# Patient Record
Sex: FEMALE | Race: BLACK OR AFRICAN AMERICAN | NOT HISPANIC OR LATINO | Employment: UNEMPLOYED | URBAN - METROPOLITAN AREA
[De-identification: names, ages, dates, MRNs, and addresses within clinical notes are randomized per-mention and may not be internally consistent; named-entity substitution may affect disease eponyms.]

---

## 2023-06-09 ENCOUNTER — HOSPITAL ENCOUNTER (OUTPATIENT)
Dept: DATA CONVERSION | Facility: HOSPITAL | Age: 58
End: 2023-06-09
Attending: INTERNAL MEDICINE | Admitting: INTERNAL MEDICINE
Payer: COMMERCIAL

## 2023-06-09 DIAGNOSIS — J45.998 OTHER ASTHMA (HHS-HCC): ICD-10-CM

## 2023-06-09 DIAGNOSIS — F17.210 NICOTINE DEPENDENCE, CIGARETTES, UNCOMPLICATED: ICD-10-CM

## 2023-06-09 DIAGNOSIS — K21.9 GASTRO-ESOPHAGEAL REFLUX DISEASE WITHOUT ESOPHAGITIS: ICD-10-CM

## 2023-06-09 DIAGNOSIS — I11.0 HYPERTENSIVE HEART DISEASE WITH HEART FAILURE (MULTI): ICD-10-CM

## 2023-06-09 DIAGNOSIS — K58.9 IRRITABLE BOWEL SYNDROME WITHOUT DIARRHEA: ICD-10-CM

## 2023-06-09 DIAGNOSIS — I27.20 PULMONARY HYPERTENSION, UNSPECIFIED (MULTI): ICD-10-CM

## 2023-06-09 DIAGNOSIS — D12.3 BENIGN NEOPLASM OF TRANSVERSE COLON: ICD-10-CM

## 2023-06-09 DIAGNOSIS — Z88.0 ALLERGY STATUS TO PENICILLIN: ICD-10-CM

## 2023-06-09 DIAGNOSIS — Z12.11 ENCOUNTER FOR SCREENING FOR MALIGNANT NEOPLASM OF COLON: ICD-10-CM

## 2023-06-09 DIAGNOSIS — R19.7 DIARRHEA, UNSPECIFIED: ICD-10-CM

## 2023-06-09 DIAGNOSIS — R11.2 NAUSEA WITH VOMITING, UNSPECIFIED: ICD-10-CM

## 2023-06-09 DIAGNOSIS — R10.13 EPIGASTRIC PAIN: ICD-10-CM

## 2023-06-09 DIAGNOSIS — E11.9 TYPE 2 DIABETES MELLITUS WITHOUT COMPLICATIONS (MULTI): ICD-10-CM

## 2023-06-09 DIAGNOSIS — E78.5 HYPERLIPIDEMIA, UNSPECIFIED: ICD-10-CM

## 2023-06-09 DIAGNOSIS — I50.9 HEART FAILURE, UNSPECIFIED (MULTI): ICD-10-CM

## 2023-06-09 LAB — POCT GLUCOSE: 112 MG/DL (ref 74–99)

## 2023-06-20 LAB
COMPLETE PATHOLOGY REPORT: NORMAL
CONVERTED CLINICAL DIAGNOSIS-HISTORY: NORMAL
CONVERTED FINAL DIAGNOSIS: NORMAL
CONVERTED FINAL REPORT PDF LINK TO COPY AND PASTE: NORMAL
CONVERTED GROSS DESCRIPTION: NORMAL

## 2023-09-07 VITALS — BODY MASS INDEX: 47.28 KG/M2 | HEIGHT: 61 IN | WEIGHT: 250.44 LBS

## 2023-09-22 PROBLEM — M54.9 BACKACHE: Status: ACTIVE | Noted: 2023-09-22

## 2023-09-22 PROBLEM — K59.09 CHRONIC CONSTIPATION: Status: ACTIVE | Noted: 2023-08-10

## 2023-09-22 PROBLEM — I50.9 HEART FAILURE (MULTI): Status: ACTIVE | Noted: 2023-06-09

## 2023-09-22 PROBLEM — M47.817 SPONDYLOSIS WITHOUT MYELOPATHY OR RADICULOPATHY, LUMBOSACRAL REGION: Status: ACTIVE | Noted: 2023-09-22

## 2023-09-22 PROBLEM — M17.11 OSTEOARTHRITIS OF RIGHT KNEE: Status: ACTIVE | Noted: 2021-11-27

## 2023-09-22 PROBLEM — R91.1 SOLITARY PULMONARY NODULE: Status: ACTIVE | Noted: 2022-05-03

## 2023-09-22 PROBLEM — Z98.890 HISTORY OF LUMBAR LAMINECTOMY: Status: ACTIVE | Noted: 2022-10-18

## 2023-09-22 PROBLEM — L93.0 DISCOID LUPUS ERYTHEMATOSUS: Status: ACTIVE | Noted: 2021-11-27

## 2023-09-22 PROBLEM — M96.1 FAILED BACK SURGICAL SYNDROME: Status: ACTIVE | Noted: 2023-09-22

## 2023-09-22 PROBLEM — F17.200 TOBACCO DEPENDENCE SYNDROME: Status: ACTIVE | Noted: 2023-09-22

## 2023-09-22 PROBLEM — J45.901 EXACERBATION OF ASTHMA (HHS-HCC): Status: ACTIVE | Noted: 2023-09-22

## 2023-09-22 PROBLEM — J34.3 HYPERTROPHY OF NASAL TURBINATES: Status: ACTIVE | Noted: 2023-08-23

## 2023-09-22 PROBLEM — K64.9 HEMORRHOIDS: Status: ACTIVE | Noted: 2023-04-03

## 2023-09-22 PROBLEM — F17.200 NICOTINE USE DISORDER: Status: ACTIVE | Noted: 2018-10-12

## 2023-09-22 PROBLEM — M19.90 INFLAMMATORY ARTHRITIS: Status: ACTIVE | Noted: 2022-10-18

## 2023-09-22 PROBLEM — M47.812 CERVICAL SPONDYLOSIS WITHOUT MYELOPATHY: Status: ACTIVE | Noted: 2022-10-18

## 2023-09-22 PROBLEM — T81.9XXA COMPLICATION OF SURGICAL PROCEDURE: Status: ACTIVE | Noted: 2022-07-01

## 2023-09-22 PROBLEM — E78.5 HYPERLIPIDEMIA: Status: ACTIVE | Noted: 2023-09-22

## 2023-09-22 PROBLEM — E11.43 GASTROPARESIS DUE TO DM (MULTI): Status: ACTIVE | Noted: 2022-07-01

## 2023-09-22 PROBLEM — I89.0 LYMPHEDEMA: Status: ACTIVE | Noted: 2022-11-11

## 2023-09-22 PROBLEM — G89.29 OTHER CHRONIC PAIN: Status: ACTIVE | Noted: 2023-09-22

## 2023-09-22 PROBLEM — R05.9 COUGH: Status: ACTIVE | Noted: 2022-01-06

## 2023-09-22 PROBLEM — R10.13 EPIGASTRIC PAIN: Status: ACTIVE | Noted: 2023-09-22

## 2023-09-22 PROBLEM — T84.84XA PAIN DUE TO KNEE JOINT PROSTHESIS (CMS-HCC): Status: ACTIVE | Noted: 2022-12-22

## 2023-09-22 PROBLEM — R76.8 ELEVATED ANTINUCLEAR ANTIBODY (ANA) LEVEL: Status: ACTIVE | Noted: 2022-10-18

## 2023-09-22 PROBLEM — R07.89 CHEST DISCOMFORT: Status: ACTIVE | Noted: 2023-09-22

## 2023-09-22 PROBLEM — Z96.659 POSTOPERATIVE STIFFNESS OF TOTAL KNEE REPLACEMENT (CMS-HCC): Status: ACTIVE | Noted: 2023-08-10

## 2023-09-22 PROBLEM — D68.61 ANTIPHOSPHOLIPID SYNDROME (MULTI): Status: ACTIVE | Noted: 2022-12-22

## 2023-09-22 PROBLEM — M06.4 INFLAMMATORY POLYARTHRITIS (MULTI): Status: ACTIVE | Noted: 2022-10-18

## 2023-09-22 PROBLEM — K31.84 GASTROPARESIS DUE TO DM (MULTI): Status: ACTIVE | Noted: 2022-07-01

## 2023-09-22 PROBLEM — Z86.79 HISTORY OF HYPERTENSION: Status: ACTIVE | Noted: 2023-03-10

## 2023-09-22 PROBLEM — J45.909 ASTHMA (HHS-HCC): Status: ACTIVE | Noted: 2023-09-22

## 2023-09-22 PROBLEM — J44.9 CHRONIC OBSTRUCTIVE PULMONARY DISEASE (MULTI): Status: ACTIVE | Noted: 2022-07-01

## 2023-09-22 PROBLEM — R09.02 HYPOXIA: Status: ACTIVE | Noted: 2023-09-22

## 2023-09-22 PROBLEM — G47.33 OBSTRUCTIVE SLEEP APNEA SYNDROME: Status: ACTIVE | Noted: 2022-07-01

## 2023-09-22 PROBLEM — M46.1 INFLAMMATION OF RIGHT SACROILIAC JOINT (CMS-HCC): Status: ACTIVE | Noted: 2023-09-22

## 2023-09-22 PROBLEM — J30.9 ALLERGIC RHINITIS: Status: ACTIVE | Noted: 2023-08-23

## 2023-09-22 PROBLEM — M32.9 SYSTEMIC LUPUS ERYTHEMATOSUS (MULTI): Status: ACTIVE | Noted: 2022-05-03

## 2023-09-22 PROBLEM — E55.9 VITAMIN D DEFICIENCY: Status: ACTIVE | Noted: 2022-10-18

## 2023-09-22 PROBLEM — R07.89 ATYPICAL CHEST PAIN: Status: ACTIVE | Noted: 2023-09-22

## 2023-09-22 PROBLEM — K31.84 GASTROPARESIS: Status: ACTIVE | Noted: 2023-09-22

## 2023-09-22 PROBLEM — E87.6 HYPOKALEMIA: Status: ACTIVE | Noted: 2021-05-12

## 2023-09-22 PROBLEM — E11.9 DIABETES MELLITUS WITHOUT COMPLICATION (MULTI): Status: ACTIVE | Noted: 2022-07-08

## 2023-09-22 PROBLEM — M62.81 MUSCLE WEAKNESS: Status: ACTIVE | Noted: 2022-07-01

## 2023-09-22 PROBLEM — I27.20 PULMONARY HYPERTENSION (MULTI): Status: ACTIVE | Noted: 2022-07-01

## 2023-09-22 PROBLEM — I15.9 SECONDARY HYPERTENSION: Status: ACTIVE | Noted: 2022-10-18

## 2023-09-22 PROBLEM — M16.10 PRIMARY LOCALIZED OSTEOARTHRITIS OF PELVIC REGION AND THIGH: Status: ACTIVE | Noted: 2022-10-18

## 2023-09-22 PROBLEM — M35.9 DISORDER OF CONNECTIVE TISSUE (MULTI): Status: ACTIVE | Noted: 2022-10-18

## 2023-09-22 PROBLEM — E11.9 TYPE 2 DIABETES MELLITUS (MULTI): Status: ACTIVE | Noted: 2017-06-30

## 2023-09-22 PROBLEM — G25.81 RESTLESS LEGS SYNDROME: Status: ACTIVE | Noted: 2019-12-18

## 2023-09-22 PROBLEM — U09.9 POST-COVID-19 CONDITION: Status: ACTIVE | Noted: 2022-09-22

## 2023-09-22 PROBLEM — R10.9 ABDOMINAL PAIN: Status: ACTIVE | Noted: 2023-09-22

## 2023-09-22 PROBLEM — T84.099A MECHANICAL COMPLICATION OF INTERNAL JOINT PROSTHESIS (CMS-HCC): Status: ACTIVE | Noted: 2023-08-10

## 2023-09-22 PROBLEM — R06.00 DYSPNEA: Status: ACTIVE | Noted: 2023-09-22

## 2023-09-22 PROBLEM — F41.9 ANXIETY DISORDER: Status: ACTIVE | Noted: 2021-10-21

## 2023-09-22 PROBLEM — M25.569 JOINT PAIN, KNEE: Status: ACTIVE | Noted: 2023-09-22

## 2023-09-22 PROBLEM — Z96.653 HISTORY OF BILATERAL KNEE ARTHROPLASTY: Status: ACTIVE | Noted: 2021-02-17

## 2023-09-22 PROBLEM — M54.50 LUMBAR SPINE PAIN: Status: ACTIVE | Noted: 2022-12-05

## 2023-09-22 PROBLEM — E13.9 LATENT AUTOIMMUNE DIABETES MELLITUS IN ADULT (LADA) (MULTI): Status: ACTIVE | Noted: 2022-07-01

## 2023-09-22 PROBLEM — Z96.651 STATUS POST REVISION OF TOTAL REPLACEMENT OF RIGHT KNEE: Status: ACTIVE | Noted: 2021-01-27

## 2023-09-22 PROBLEM — M11.20 CALCIUM PYROPHOSPHATE DEPOSITION DISEASE: Status: ACTIVE | Noted: 2022-07-01

## 2023-09-22 PROBLEM — M51.36 DEGENERATION OF INTERVERTEBRAL DISC OF LUMBAR REGION: Status: ACTIVE | Noted: 2022-07-01

## 2023-09-22 PROBLEM — E66.813 OBESITY, CLASS III, BMI 40-49.9 (MORBID OBESITY): Chronic | Status: ACTIVE | Noted: 2018-10-09

## 2023-09-22 PROBLEM — K52.9 GASTROENTERITIS: Status: ACTIVE | Noted: 2021-05-12

## 2023-09-22 PROBLEM — M25.561 PAIN IN RIGHT KNEE: Status: ACTIVE | Noted: 2018-01-05

## 2023-09-22 PROBLEM — J45.40 MODERATE PERSISTENT ASTHMA WITHOUT COMPLICATION (HHS-HCC): Status: ACTIVE | Noted: 2022-01-10

## 2023-09-22 PROBLEM — M54.16 LUMBAR RADICULITIS: Status: ACTIVE | Noted: 2023-05-19

## 2023-09-22 PROBLEM — E66.01 OBESITY, CLASS III, BMI 40-49.9 (MORBID OBESITY) (MULTI): Chronic | Status: ACTIVE | Noted: 2018-10-09

## 2023-09-22 PROBLEM — T84.89XA POSTOPERATIVE STIFFNESS OF TOTAL KNEE REPLACEMENT (CMS-HCC): Status: ACTIVE | Noted: 2023-08-10

## 2023-09-22 PROBLEM — M53.3 COCCYDYNIA: Status: ACTIVE | Noted: 2022-10-18

## 2023-09-22 PROBLEM — M51.369 DEGENERATION OF INTERVERTEBRAL DISC OF LUMBAR REGION: Status: ACTIVE | Noted: 2022-07-01

## 2023-09-22 PROBLEM — M25.669 POSTOPERATIVE STIFFNESS OF TOTAL KNEE REPLACEMENT (CMS-HCC): Status: ACTIVE | Noted: 2023-08-10

## 2023-09-22 PROBLEM — T84.029A DISLOCATION OF PROSTHETIC JOINT (CMS-HCC): Status: ACTIVE | Noted: 2022-10-18

## 2023-09-22 PROBLEM — R00.2 PALPITATIONS: Status: ACTIVE | Noted: 2021-10-22

## 2023-09-22 PROBLEM — F31.9 BIPOLAR AFFECTIVE DISORDER, CURRENTLY ACTIVE (MULTI): Status: ACTIVE | Noted: 2020-08-15

## 2023-09-22 PROBLEM — M10.9 GOUT: Status: ACTIVE | Noted: 2022-07-01

## 2023-09-22 PROBLEM — K21.00 GASTROESOPHAGEAL REFLUX DISEASE WITH ESOPHAGITIS: Status: ACTIVE | Noted: 2023-02-28

## 2023-09-22 PROBLEM — R11.2 NAUSEA & VOMITING: Status: ACTIVE | Noted: 2023-09-22

## 2023-09-22 PROBLEM — J18.9 PNEUMONIA: Status: ACTIVE | Noted: 2023-09-22

## 2023-09-22 PROBLEM — I89.0 LYMPHEDEMA OF BOTH LOWER EXTREMITIES: Status: ACTIVE | Noted: 2022-07-08

## 2023-09-22 PROBLEM — I11.9 HYPERTENSIVE HEART DISEASE: Status: ACTIVE | Noted: 2023-06-09

## 2023-09-22 PROBLEM — R10.13 DYSPEPSIA: Status: ACTIVE | Noted: 2021-05-12

## 2023-09-22 PROBLEM — E11.8: Status: ACTIVE | Noted: 2022-10-18

## 2023-09-22 PROBLEM — M35.89 MIXED COLLAGEN VASCULAR DISEASE (MULTI): Status: ACTIVE | Noted: 2022-12-22

## 2023-09-22 PROBLEM — K58.9 IRRITABLE BOWEL SYNDROME: Status: ACTIVE | Noted: 2022-09-14

## 2023-09-22 PROBLEM — Z96.659 PAIN DUE TO KNEE JOINT PROSTHESIS (CMS-HCC): Status: ACTIVE | Noted: 2022-12-22

## 2023-09-22 PROBLEM — Z96.642 PRESENCE OF LEFT ARTIFICIAL HIP JOINT: Status: ACTIVE | Noted: 2021-11-27

## 2023-09-22 PROBLEM — Z96.651 PRESENCE OF RIGHT ARTIFICIAL KNEE JOINT: Status: ACTIVE | Noted: 2023-09-22

## 2023-09-22 PROBLEM — G83.4 CAUDA EQUINA SYNDROME (MULTI): Status: ACTIVE | Noted: 2022-10-18

## 2023-09-22 RX ORDER — FLUTICASONE FUROATE, UMECLIDINIUM BROMIDE AND VILANTEROL TRIFENATATE 200; 62.5; 25 UG/1; UG/1; UG/1
1 POWDER RESPIRATORY (INHALATION)
COMMUNITY
Start: 2023-08-23

## 2023-09-22 RX ORDER — METOCLOPRAMIDE 5 MG/1
1 TABLET ORAL DAILY
COMMUNITY
Start: 2022-09-14

## 2023-09-22 RX ORDER — GLIMEPIRIDE 1 MG/1
TABLET ORAL EVERY 24 HOURS
COMMUNITY
End: 2024-02-26 | Stop reason: ALTCHOICE

## 2023-09-22 RX ORDER — TIZANIDINE 2 MG/1
TABLET ORAL
COMMUNITY
Start: 2021-10-18 | End: 2024-02-26 | Stop reason: ALTCHOICE

## 2023-09-22 RX ORDER — ROSUVASTATIN CALCIUM 20 MG/1
20 TABLET, COATED ORAL DAILY
COMMUNITY
Start: 2021-10-02

## 2023-09-22 RX ORDER — METHYLPREDNISOLONE 4 MG/1
TABLET ORAL
COMMUNITY
Start: 2023-04-21 | End: 2024-02-26 | Stop reason: ALTCHOICE

## 2023-09-22 RX ORDER — OXYCODONE HYDROCHLORIDE 5 MG/1
1 TABLET ORAL 2 TIMES DAILY
COMMUNITY
End: 2024-03-14 | Stop reason: ENTERED-IN-ERROR

## 2023-09-22 RX ORDER — CHLORZOXAZONE 500 MG/1
TABLET ORAL EVERY 12 HOURS
COMMUNITY
Start: 2022-01-31 | End: 2024-02-26 | Stop reason: ALTCHOICE

## 2023-09-22 RX ORDER — AMLODIPINE BESYLATE 10 MG/1
TABLET ORAL
COMMUNITY
Start: 2023-05-30 | End: 2024-02-26 | Stop reason: ALTCHOICE

## 2023-09-22 RX ORDER — HYDROXYZINE PAMOATE 50 MG/1
CAPSULE ORAL
COMMUNITY
Start: 2021-10-22 | End: 2024-03-14 | Stop reason: ENTERED-IN-ERROR

## 2023-09-22 RX ORDER — CHLORZOXAZONE 250 MG/1
TABLET ORAL
COMMUNITY
End: 2024-03-14 | Stop reason: ENTERED-IN-ERROR

## 2023-09-22 RX ORDER — NAPROXEN SODIUM 220 MG/1
81 TABLET, FILM COATED ORAL
COMMUNITY
Start: 2021-03-22 | End: 2024-03-14 | Stop reason: ENTERED-IN-ERROR

## 2023-09-22 RX ORDER — ERGOCALCIFEROL 1.25 MG/1
50000 CAPSULE ORAL WEEKLY
COMMUNITY
Start: 2023-04-19 | End: 2023-10-04

## 2023-09-22 RX ORDER — PREGABALIN 25 MG/1
CAPSULE ORAL
COMMUNITY
Start: 2023-01-17 | End: 2024-02-26 | Stop reason: ALTCHOICE

## 2023-09-22 RX ORDER — ALBUTEROL SULFATE 90 UG/1
2 AEROSOL, METERED RESPIRATORY (INHALATION)
COMMUNITY
Start: 2021-08-09 | End: 2024-02-26 | Stop reason: ALTCHOICE

## 2023-09-22 RX ORDER — OXYBUTYNIN CHLORIDE 10 MG/1
10 TABLET, EXTENDED RELEASE ORAL
COMMUNITY
Start: 2023-07-06 | End: 2023-10-04

## 2023-09-22 RX ORDER — CYCLOBENZAPRINE HCL 5 MG
5 TABLET ORAL 3 TIMES DAILY PRN
COMMUNITY
Start: 2021-08-30 | End: 2024-02-26 | Stop reason: ALTCHOICE

## 2023-09-22 RX ORDER — METFORMIN HYDROCHLORIDE 500 MG/1
TABLET, EXTENDED RELEASE ORAL
COMMUNITY
Start: 2022-12-22 | End: 2024-02-26 | Stop reason: ALTCHOICE

## 2023-09-22 RX ORDER — CLOTRIMAZOLE 1 %
CREAM (GRAM) TOPICAL
COMMUNITY
Start: 2023-03-06 | End: 2024-03-14 | Stop reason: ENTERED-IN-ERROR

## 2023-09-22 RX ORDER — PROMETHAZINE HYDROCHLORIDE AND DEXTROMETHORPHAN HYDROBROMIDE 6.25; 15 MG/5ML; MG/5ML
5 SYRUP ORAL 4 TIMES DAILY PRN
COMMUNITY
Start: 2022-10-03 | End: 2024-02-26 | Stop reason: ALTCHOICE

## 2023-09-22 RX ORDER — LEVOFLOXACIN 750 MG/1
TABLET ORAL
COMMUNITY
Start: 2023-04-20 | End: 2024-01-15 | Stop reason: SDUPTHER

## 2023-09-22 RX ORDER — MINERAL OIL
180 ENEMA (ML) RECTAL
COMMUNITY
Start: 2023-07-06 | End: 2024-03-15 | Stop reason: HOSPADM

## 2023-09-22 RX ORDER — FLUTICASONE PROPIONATE 50 MCG
1 SPRAY, SUSPENSION (ML) NASAL 2 TIMES DAILY PRN
COMMUNITY
Start: 2017-06-10

## 2023-09-22 RX ORDER — DOXEPIN HYDROCHLORIDE 25 MG/1
CAPSULE ORAL EVERY 24 HOURS
COMMUNITY
Start: 2021-12-03 | End: 2024-02-26 | Stop reason: ALTCHOICE

## 2023-09-22 RX ORDER — COLCHICINE 0.6 MG/1
1 TABLET ORAL
COMMUNITY
Start: 2022-06-23 | End: 2024-03-14 | Stop reason: ENTERED-IN-ERROR

## 2023-09-22 RX ORDER — MOMETASONE FUROATE AND FORMOTEROL FUMARATE DIHYDRATE 100; 5 UG/1; UG/1
AEROSOL RESPIRATORY (INHALATION)
COMMUNITY
End: 2024-02-26 | Stop reason: ALTCHOICE

## 2023-09-22 RX ORDER — BENZONATATE 100 MG/1
CAPSULE ORAL
COMMUNITY
Start: 2022-10-10 | End: 2024-02-26 | Stop reason: ALTCHOICE

## 2023-09-22 RX ORDER — BUSPIRONE HYDROCHLORIDE 15 MG/1
TABLET ORAL
COMMUNITY
Start: 2021-12-03 | End: 2024-02-26 | Stop reason: ALTCHOICE

## 2023-09-22 RX ORDER — MIRABEGRON 25 MG/1
TABLET, FILM COATED, EXTENDED RELEASE ORAL
COMMUNITY
End: 2024-03-14 | Stop reason: ENTERED-IN-ERROR

## 2023-09-22 RX ORDER — MICONAZOLE NITRATE 2 %
CREAM WITH APPLICATOR VAGINAL
COMMUNITY
Start: 2023-04-03 | End: 2024-02-26 | Stop reason: ALTCHOICE

## 2023-09-22 RX ORDER — PANTOPRAZOLE SODIUM 40 MG/1
1 TABLET, DELAYED RELEASE ORAL
COMMUNITY
Start: 2021-10-02 | End: 2023-11-01 | Stop reason: SDUPTHER

## 2023-09-22 RX ORDER — ERYTHROMYCIN 5 MG/G
OINTMENT OPHTHALMIC
COMMUNITY
Start: 2023-01-06 | End: 2024-02-26 | Stop reason: ALTCHOICE

## 2023-09-22 RX ORDER — LORATADINE 10 MG/1
10 TABLET ORAL DAILY
COMMUNITY
Start: 2023-02-15 | End: 2024-02-26 | Stop reason: ALTCHOICE

## 2023-09-22 RX ORDER — MIRTAZAPINE 15 MG/1
TABLET, FILM COATED ORAL
COMMUNITY
Start: 2020-08-15 | End: 2024-02-26 | Stop reason: ALTCHOICE

## 2023-09-22 RX ORDER — PREDNISONE 20 MG/1
TABLET ORAL
COMMUNITY
Start: 2023-05-19 | End: 2024-01-15 | Stop reason: SDUPTHER

## 2023-09-22 RX ORDER — HYDROCORTISONE 25 MG/G
CREAM TOPICAL
COMMUNITY
Start: 2023-03-07 | End: 2024-02-26 | Stop reason: ALTCHOICE

## 2023-09-22 RX ORDER — NALOXONE HYDROCHLORIDE 4 MG/.1ML
4 SPRAY NASAL
COMMUNITY
Start: 2023-06-22 | End: 2024-03-14 | Stop reason: ENTERED-IN-ERROR

## 2023-09-22 RX ORDER — ALBUTEROL SULFATE 90 UG/1
AEROSOL, METERED RESPIRATORY (INHALATION)
COMMUNITY

## 2023-09-22 RX ORDER — FLUOROMETHOLONE 1 MG/ML
SUSPENSION/ DROPS OPHTHALMIC
COMMUNITY
Start: 2023-01-03 | End: 2024-02-26 | Stop reason: ALTCHOICE

## 2023-09-22 RX ORDER — FLUTICASONE FUROATE AND VILANTEROL 200; 25 UG/1; UG/1
POWDER RESPIRATORY (INHALATION)
COMMUNITY
Start: 2021-08-09 | End: 2024-02-26 | Stop reason: ALTCHOICE

## 2023-09-22 RX ORDER — ROPINIROLE 0.5 MG/1
0.5 TABLET, FILM COATED ORAL NIGHTLY
COMMUNITY
Start: 2023-08-10

## 2023-09-22 RX ORDER — TORSEMIDE 20 MG/1
20 TABLET ORAL
COMMUNITY
Start: 2023-07-06 | End: 2024-02-26 | Stop reason: ALTCHOICE

## 2023-09-22 RX ORDER — VIT C/E/ZN/COPPR/LUTEIN/ZEAXAN 250MG-90MG
3 CAPSULE ORAL
COMMUNITY
Start: 2021-10-27 | End: 2024-03-14 | Stop reason: ENTERED-IN-ERROR

## 2023-09-22 RX ORDER — AMITRIPTYLINE HYDROCHLORIDE 10 MG/1
1 TABLET, FILM COATED ORAL NIGHTLY
COMMUNITY
Start: 2023-05-29 | End: 2024-05-16 | Stop reason: ALTCHOICE

## 2023-09-22 RX ORDER — AZELASTINE 1 MG/ML
1 SPRAY, METERED NASAL 2 TIMES DAILY PRN
COMMUNITY
Start: 2023-08-23 | End: 2024-03-15 | Stop reason: HOSPADM

## 2023-09-22 RX ORDER — ZINC SULFATE 50(220)MG
CAPSULE ORAL
COMMUNITY
End: 2024-02-26 | Stop reason: ALTCHOICE

## 2023-09-22 RX ORDER — METOPROLOL TARTRATE 50 MG/1
1 TABLET ORAL
COMMUNITY
Start: 2021-05-12 | End: 2024-02-26 | Stop reason: WASHOUT

## 2023-09-22 RX ORDER — AZELASTINE HYDROCHLORIDE 0.5 MG/ML
SOLUTION/ DROPS OPHTHALMIC
COMMUNITY
Start: 2022-11-08 | End: 2024-02-26 | Stop reason: ALTCHOICE

## 2023-09-22 RX ORDER — DULOXETIN HYDROCHLORIDE 30 MG/1
30 CAPSULE, DELAYED RELEASE ORAL 2 TIMES DAILY
COMMUNITY
Start: 2022-12-22 | End: 2024-02-26 | Stop reason: ALTCHOICE

## 2023-09-22 RX ORDER — METRONIDAZOLE 7.5 MG/G
GEL VAGINAL
COMMUNITY
Start: 2023-02-03 | End: 2024-02-26 | Stop reason: ALTCHOICE

## 2023-09-22 RX ORDER — BUSPIRONE HYDROCHLORIDE 5 MG/1
TABLET ORAL
COMMUNITY
End: 2024-02-26 | Stop reason: ALTCHOICE

## 2023-09-22 RX ORDER — LANOLIN ALCOHOL/MO/W.PET/CERES
1 CREAM (GRAM) TOPICAL
COMMUNITY
Start: 2021-10-27

## 2023-09-22 RX ORDER — PREDNISONE 50 MG/1
50 TABLET ORAL DAILY
COMMUNITY
Start: 2022-10-05 | End: 2022-10-10

## 2023-09-22 RX ORDER — MONTELUKAST SODIUM 10 MG/1
10 TABLET ORAL DAILY PRN
COMMUNITY
Start: 2016-02-10

## 2023-09-22 RX ORDER — HYOSCYAMINE SULFATE 0.12 MG/1
TABLET, ORALLY DISINTEGRATING ORAL
COMMUNITY
Start: 2021-10-06 | End: 2024-03-14

## 2023-09-22 RX ORDER — CETIRIZINE HYDROCHLORIDE 10 MG/1
10 TABLET ORAL DAILY
COMMUNITY
Start: 2015-03-20

## 2023-09-22 RX ORDER — CHLORTHALIDONE 25 MG/1
25 TABLET ORAL
COMMUNITY
Start: 2023-06-13 | End: 2024-03-21 | Stop reason: SDUPTHER

## 2023-09-22 RX ORDER — ACETAMINOPHEN 325 MG/1
650 TABLET ORAL
COMMUNITY
Start: 2021-08-30 | End: 2024-02-26 | Stop reason: ALTCHOICE

## 2023-09-22 RX ORDER — FAMOTIDINE 20 MG/1
TABLET, FILM COATED ORAL
COMMUNITY
Start: 2021-10-02 | End: 2024-02-26 | Stop reason: ALTCHOICE

## 2023-09-22 RX ORDER — CEFIXIME 400 MG/1
400 CAPSULE ORAL DAILY
COMMUNITY
Start: 2023-03-11 | End: 2023-03-18

## 2023-09-22 RX ORDER — LEVOFLOXACIN 500 MG/1
500 TABLET, FILM COATED ORAL DAILY
COMMUNITY
Start: 2023-05-03 | End: 2023-05-10

## 2023-09-22 RX ORDER — ACETAMINOPHEN 500 MG
5 TABLET ORAL DAILY PRN
COMMUNITY
Start: 2023-07-06

## 2023-09-22 RX ORDER — HYDROCODONE BITARTRATE AND ACETAMINOPHEN 5; 325 MG/1; MG/1
TABLET ORAL
COMMUNITY
Start: 2023-08-24 | End: 2024-02-26 | Stop reason: ALTCHOICE

## 2023-09-22 RX ORDER — HYDROCHLOROTHIAZIDE 25 MG/1
25 TABLET ORAL DAILY
COMMUNITY
Start: 2023-05-03 | End: 2024-02-26 | Stop reason: SINTOL

## 2023-09-22 RX ORDER — BISACODYL 5 MG
TABLET, DELAYED RELEASE (ENTERIC COATED) ORAL
COMMUNITY
Start: 2023-04-05

## 2023-09-22 RX ORDER — HYDROXYCHLOROQUINE SULFATE 200 MG/1
TABLET, FILM COATED ORAL
COMMUNITY
End: 2024-03-14 | Stop reason: ENTERED-IN-ERROR

## 2023-09-22 RX ORDER — METOPROLOL SUCCINATE 50 MG/1
50 TABLET, EXTENDED RELEASE ORAL
COMMUNITY
Start: 2023-06-05 | End: 2024-04-29 | Stop reason: WASHOUT

## 2023-09-22 RX ORDER — NAPROXEN 500 MG/1
TABLET ORAL
COMMUNITY
Start: 2022-10-10 | End: 2024-02-26 | Stop reason: ALTCHOICE

## 2023-09-22 RX ORDER — IPRATROPIUM BROMIDE AND ALBUTEROL SULFATE 2.5; .5 MG/3ML; MG/3ML
3 SOLUTION RESPIRATORY (INHALATION) EVERY 6 HOURS PRN
COMMUNITY
Start: 2023-08-23

## 2023-09-22 RX ORDER — SENNOSIDES 8.6 MG/1
8.6 TABLET ORAL
COMMUNITY
Start: 2021-07-01 | End: 2024-03-14 | Stop reason: ENTERED-IN-ERROR

## 2023-09-22 RX ORDER — DICLOFENAC SODIUM 10 MG/G
GEL TOPICAL
COMMUNITY
Start: 2021-10-19 | End: 2024-02-26 | Stop reason: ALTCHOICE

## 2023-09-22 RX ORDER — LIDOCAINE 50 MG/G
1 PATCH TOPICAL DAILY PRN
COMMUNITY
Start: 2015-10-13

## 2023-09-22 RX ORDER — LOSARTAN POTASSIUM AND HYDROCHLOROTHIAZIDE 25; 100 MG/1; MG/1
1 TABLET ORAL DAILY
COMMUNITY
Start: 2023-04-04 | End: 2024-02-26 | Stop reason: WASHOUT

## 2023-09-22 RX ORDER — ETODOLAC 400 MG/1
TABLET, FILM COATED ORAL EVERY 12 HOURS
COMMUNITY
Start: 2022-01-31 | End: 2024-03-14 | Stop reason: ENTERED-IN-ERROR

## 2023-09-22 RX ORDER — OXYBUTYNIN CHLORIDE 10 MG/1
10 TABLET, EXTENDED RELEASE ORAL DAILY
COMMUNITY

## 2023-09-22 RX ORDER — BEBTELOVIMAB 87.5 MG/ML
INJECTION, SOLUTION INTRAVENOUS
COMMUNITY
End: 2024-02-26 | Stop reason: ALTCHOICE

## 2023-09-22 RX ORDER — ALUMINUM HYDROXIDE, MAGNESIUM HYDROXIDE, AND SIMETHICONE 2400; 240; 2400 MG/30ML; MG/30ML; MG/30ML
SUSPENSION ORAL
COMMUNITY
Start: 2021-06-03 | End: 2024-03-14 | Stop reason: ENTERED-IN-ERROR

## 2023-09-22 RX ORDER — OXYCODONE AND ACETAMINOPHEN 5; 325 MG/1; MG/1
1 TABLET ORAL
COMMUNITY
Start: 2023-07-25 | End: 2023-10-19

## 2023-09-22 RX ORDER — APIXABAN 5 MG/1
TABLET, FILM COATED ORAL
COMMUNITY
Start: 2022-11-11 | End: 2024-02-26 | Stop reason: ALTCHOICE

## 2023-09-22 RX ORDER — PETROLATUM,WHITE 41 %
OINTMENT (GRAM) TOPICAL
COMMUNITY
Start: 2021-03-22 | End: 2024-03-14 | Stop reason: ENTERED-IN-ERROR

## 2023-09-22 RX ORDER — ZINC GLUCONATE 50 MG
TABLET ORAL
COMMUNITY
End: 2024-02-26 | Stop reason: ALTCHOICE

## 2023-09-22 RX ORDER — MELOXICAM 15 MG/1
15 TABLET ORAL
COMMUNITY
Start: 2021-11-02 | End: 2024-03-14 | Stop reason: ENTERED-IN-ERROR

## 2023-09-22 RX ORDER — TRAMADOL HYDROCHLORIDE 50 MG/1
TABLET ORAL
COMMUNITY
End: 2024-02-26 | Stop reason: ALTCHOICE

## 2023-09-22 RX ORDER — PRAMOXINE HYDROCHLORIDE HYDROCORTISONE ACETATE 100; 100 MG/10G; MG/10G
AEROSOL, FOAM TOPICAL
COMMUNITY
Start: 2021-05-12 | End: 2024-02-26 | Stop reason: ALTCHOICE

## 2023-09-22 RX ORDER — DEXLANSOPRAZOLE 60 MG/1
CAPSULE, DELAYED RELEASE ORAL
COMMUNITY
Start: 2022-09-14 | End: 2024-03-14 | Stop reason: ENTERED-IN-ERROR

## 2023-09-22 RX ORDER — ALBUTEROL SULFATE 0.83 MG/ML
2.5 SOLUTION RESPIRATORY (INHALATION) EVERY 6 HOURS PRN
COMMUNITY
Start: 2013-12-03

## 2023-09-22 RX ORDER — TOPIRAMATE 50 MG/1
50 TABLET, FILM COATED ORAL NIGHTLY
COMMUNITY
Start: 2023-03-10 | End: 2024-02-26 | Stop reason: ALTCHOICE

## 2023-09-22 RX ORDER — ONDANSETRON 4 MG/1
1 TABLET, FILM COATED ORAL EVERY 6 HOURS PRN
COMMUNITY

## 2023-09-22 RX ORDER — TOPIRAMATE 25 MG/1
25 TABLET ORAL NIGHTLY
COMMUNITY
End: 2024-02-26 | Stop reason: ALTCHOICE

## 2023-09-22 RX ORDER — PREDNISONE 10 MG/1
TABLET ORAL
COMMUNITY
Start: 2023-04-24 | End: 2024-02-26 | Stop reason: ALTCHOICE

## 2023-09-22 RX ORDER — HYDROCODONE BITARTRATE AND HOMATROPINE METHYLBROMIDE ORAL SOLUTION 5; 1.5 MG/5ML; MG/5ML
LIQUID ORAL
COMMUNITY
Start: 2022-10-05 | End: 2024-02-26 | Stop reason: ALTCHOICE

## 2023-09-22 RX ORDER — FAMOTIDINE 40 MG/1
1 TABLET, FILM COATED ORAL NIGHTLY
COMMUNITY
Start: 2023-08-23 | End: 2024-02-07 | Stop reason: SDUPTHER

## 2023-09-22 RX ORDER — LOSARTAN POTASSIUM AND HYDROCHLOROTHIAZIDE 12.5; 5 MG/1; MG/1
TABLET ORAL
COMMUNITY
Start: 2021-10-02 | End: 2024-02-26 | Stop reason: WASHOUT

## 2023-09-22 RX ORDER — AZELASTINE HCL 205.5 UG/1
SPRAY NASAL
COMMUNITY
Start: 2022-05-12 | End: 2024-02-26 | Stop reason: ALTCHOICE

## 2023-09-22 RX ORDER — SODIUM CHLORIDE 0.9 % (FLUSH) 0.9 %
10 SYRINGE (ML) INJECTION
COMMUNITY
Start: 2023-03-16 | End: 2024-02-26 | Stop reason: ALTCHOICE

## 2023-11-01 ENCOUNTER — OFFICE VISIT (OUTPATIENT)
Dept: GASTROENTEROLOGY | Facility: CLINIC | Age: 58
End: 2023-11-01
Payer: COMMERCIAL

## 2023-11-01 VITALS
DIASTOLIC BLOOD PRESSURE: 101 MMHG | WEIGHT: 261 LBS | BODY MASS INDEX: 48.03 KG/M2 | HEIGHT: 62 IN | SYSTOLIC BLOOD PRESSURE: 155 MMHG | HEART RATE: 77 BPM

## 2023-11-01 DIAGNOSIS — K21.9 GASTROESOPHAGEAL REFLUX DISEASE WITHOUT ESOPHAGITIS: Primary | ICD-10-CM

## 2023-11-01 PROCEDURE — 3077F SYST BP >= 140 MM HG: CPT | Performed by: INTERNAL MEDICINE

## 2023-11-01 PROCEDURE — 99214 OFFICE O/P EST MOD 30 MIN: CPT | Performed by: INTERNAL MEDICINE

## 2023-11-01 PROCEDURE — 3080F DIAST BP >= 90 MM HG: CPT | Performed by: INTERNAL MEDICINE

## 2023-11-01 RX ORDER — OXYCODONE AND ACETAMINOPHEN 5; 325 MG/1; MG/1
1 TABLET ORAL
COMMUNITY
Start: 2023-10-24 | End: 2023-11-23

## 2023-11-01 RX ORDER — PANTOPRAZOLE SODIUM 40 MG/1
40 TABLET, DELAYED RELEASE ORAL 2 TIMES DAILY
Qty: 60 TABLET | Refills: 11 | Status: SHIPPED | OUTPATIENT
Start: 2023-11-01 | End: 2024-02-26 | Stop reason: WASHOUT

## 2023-11-01 NOTE — PATIENT INSTRUCTIONS
I have ordered another gastric emptying study as we cannot locate the previous one and it would affect future treatment. For the acid reflux I increased the pantoprazole to twice a day. Follow up in 4 months.

## 2023-11-01 NOTE — PROGRESS NOTES
History Of Present Illness  Josemanuel Reinoso is a 58 y.o. female presenting with epigastric pain and nausea.     She has morbid obesity and has a longstanding history of abdominal pain, GERD and has a chart diagnosis of gastroparesis.  She is s/p ventral hernia repair in 2018. She reportedly had an abnormal gastric emptying study at an outside hospital but we cannot obtain the report.  She has been on chronic PPI therapy most recently pantoprazole 40 mg once a day.  She complains of frequent bouts of epigastric pain, nausea and regurgitation.  Symptoms are worse with bending over.  She also has had mid abdominal pain that has been attributed to recurrence of a ventral hernia.  Her weight has been stable.  Bowel movements have been fairly regular.  Her primary care physician gave her a prescription for Reglan but she has not taken it yet.     Past Medical History  Past Medical History:   Diagnosis Date    Personal history of other diseases of the circulatory system     History of hypertension    Personal history of other endocrine, nutritional and metabolic disease     History of diabetes mellitus       Surgical History  Past Surgical History:   Procedure Laterality Date    MR HEAD ANGIO WO IV CONTRAST  7/14/2019    MR HEAD ANGIO WO IV CONTR[facility]    MR HEAD ANGIO WO IV CONTRAST  7/14/2019    MR HEAD ANGIO WO IV CONTRAST [facility]    MR NECK ANGIO WO IV CONTRAST  7/14/2019    MR NECK ANGIO WO IV CONTRAST [facility]        Social History  She reports that she has been smoking cigarettes. She has never used smokeless tobacco. No history on file for alcohol use and drug use.    Family History  No family history on file.     Allergies  Azithromycin, Lamotrigine, Doxycycline, Erythromycin, Metformin, Penicillins, Cephalexin, Ciprocinonide, Codeine, Fluticasone propion-salmeterol, Ibuprofen, Pregabalin, Tramadol, Ciprofloxacin, Estradiol, and Nickel    Review of Systems     Physical  "Exam  Constitutional:       Appearance: Normal appearance.   Cardiovascular:      Rate and Rhythm: Normal rate and regular rhythm.   Pulmonary:      Effort: Pulmonary effort is normal.      Breath sounds: Normal breath sounds.   Abdominal:      General: Bowel sounds are normal.      Palpations: Abdomen is soft.      Tenderness: There is abdominal tenderness in the epigastric area.   Neurological:      Mental Status: She is alert.         Lab Results   Component Value Date    GLUCOSE 71 09/30/2022    CALCIUM 9.3 09/30/2022     09/30/2022    K 3.3 (L) 09/30/2022    CO2 29 09/30/2022     09/30/2022    BUN 12 09/30/2022    CREATININE 1.0 09/30/2022     Lab Results   Component Value Date    WBC 16.8 (H) 09/30/2022    HGB 12.7 09/30/2022    HCT 41.2 09/30/2022    MCV 82.1 09/30/2022     09/30/2022     09/30/2022    K 3.3 (L) 09/30/2022     09/30/2022    CO2 29 09/30/2022    BUN 12 09/30/2022    CREATININE 1.0 09/30/2022    CALCIUM 9.3 09/30/2022    PROT 7.2 09/30/2022    BILITOT 0.2 09/30/2022    ALKPHOS 129 (H) 09/30/2022    ALT 14 09/30/2022    AST 13 09/30/2022    GLUCOSE 71 09/30/2022        Last Recorded Vitals  Blood pressure (!) 155/101, pulse 77, height 1.575 m (5' 2\"), weight 118 kg (261 lb).    Relevant Results  No orders to display      No orders to display             ASSESSMENT & PLAN  Problem List Items Addressed This Visit       Gastroesophageal reflux disease without esophagitis - Primary    Relevant Medications    pantoprazole (ProtoNix) 40 mg EC tablet   Much of her problem is GERD related and if she has delayed gastric emptying it could be a contributing factor.  I have ordered another gastric emptying study to help clarify whether she does in fact have gastroparesis and will increase her pantoprazole to twice daily.  She will follow-up with me in 4 months time.      I spent 20 minutes in the professional and overall care of this patient.      Jose Francisco Suresh MD  "

## 2024-01-15 ENCOUNTER — APPOINTMENT (OUTPATIENT)
Dept: RADIOLOGY | Facility: HOSPITAL | Age: 59
End: 2024-01-15
Payer: COMMERCIAL

## 2024-01-15 ENCOUNTER — APPOINTMENT (OUTPATIENT)
Dept: CARDIOLOGY | Facility: HOSPITAL | Age: 59
End: 2024-01-15
Payer: COMMERCIAL

## 2024-01-15 ENCOUNTER — HOSPITAL ENCOUNTER (EMERGENCY)
Facility: HOSPITAL | Age: 59
Discharge: HOME | End: 2024-01-16
Attending: STUDENT IN AN ORGANIZED HEALTH CARE EDUCATION/TRAINING PROGRAM
Payer: COMMERCIAL

## 2024-01-15 VITALS
WEIGHT: 250 LBS | SYSTOLIC BLOOD PRESSURE: 137 MMHG | BODY MASS INDEX: 42.68 KG/M2 | RESPIRATION RATE: 22 BRPM | DIASTOLIC BLOOD PRESSURE: 87 MMHG | OXYGEN SATURATION: 97 % | TEMPERATURE: 98.4 F | HEART RATE: 75 BPM | HEIGHT: 64 IN

## 2024-01-15 DIAGNOSIS — J45.20 MILD INTERMITTENT ASTHMA, UNSPECIFIED WHETHER COMPLICATED (HHS-HCC): Primary | ICD-10-CM

## 2024-01-15 LAB
ALBUMIN SERPL BCP-MCNC: 3.6 G/DL (ref 3.4–5)
ALP SERPL-CCNC: 79 U/L (ref 33–110)
ALT SERPL W P-5'-P-CCNC: 11 U/L (ref 7–45)
ANION GAP SERPL CALC-SCNC: 12 MMOL/L (ref 10–20)
AST SERPL W P-5'-P-CCNC: 19 U/L (ref 9–39)
BASOPHILS # BLD AUTO: 0.06 X10*3/UL (ref 0–0.1)
BASOPHILS NFR BLD AUTO: 0.6 %
BILIRUB SERPL-MCNC: 0.3 MG/DL (ref 0–1.2)
BNP SERPL-MCNC: 33 PG/ML (ref 0–99)
BUN SERPL-MCNC: 10 MG/DL (ref 6–23)
CALCIUM SERPL-MCNC: 8.4 MG/DL (ref 8.6–10.3)
CARDIAC TROPONIN I PNL SERPL HS: 7 NG/L (ref 0–13)
CHLORIDE SERPL-SCNC: 107 MMOL/L (ref 98–107)
CO2 SERPL-SCNC: 27 MMOL/L (ref 21–32)
CREAT SERPL-MCNC: 0.86 MG/DL (ref 0.5–1.05)
EGFRCR SERPLBLD CKD-EPI 2021: 78 ML/MIN/1.73M*2
EOSINOPHIL # BLD AUTO: 0.12 X10*3/UL (ref 0–0.7)
EOSINOPHIL NFR BLD AUTO: 1.1 %
ERYTHROCYTE [DISTWIDTH] IN BLOOD BY AUTOMATED COUNT: 16.7 % (ref 11.5–14.5)
FLUAV RNA RESP QL NAA+PROBE: NOT DETECTED
FLUBV RNA RESP QL NAA+PROBE: NOT DETECTED
GLUCOSE SERPL-MCNC: 119 MG/DL (ref 74–99)
HCT VFR BLD AUTO: 38.3 % (ref 36–46)
HGB BLD-MCNC: 12 G/DL (ref 12–16)
IMM GRANULOCYTES # BLD AUTO: 0.04 X10*3/UL (ref 0–0.7)
IMM GRANULOCYTES NFR BLD AUTO: 0.4 % (ref 0–0.9)
LIPASE SERPL-CCNC: 29 U/L (ref 9–82)
LYMPHOCYTES # BLD AUTO: 4.35 X10*3/UL (ref 1.2–4.8)
LYMPHOCYTES NFR BLD AUTO: 40.5 %
MAGNESIUM SERPL-MCNC: 2.06 MG/DL (ref 1.6–2.4)
MCH RBC QN AUTO: 25.9 PG (ref 26–34)
MCHC RBC AUTO-ENTMCNC: 31.3 G/DL (ref 32–36)
MCV RBC AUTO: 83 FL (ref 80–100)
MONOCYTES # BLD AUTO: 0.7 X10*3/UL (ref 0.1–1)
MONOCYTES NFR BLD AUTO: 6.5 %
NEUTROPHILS # BLD AUTO: 5.47 X10*3/UL (ref 1.2–7.7)
NEUTROPHILS NFR BLD AUTO: 50.9 %
NRBC BLD-RTO: 0 /100 WBCS (ref 0–0)
PLATELET # BLD AUTO: 334 X10*3/UL (ref 150–450)
POTASSIUM SERPL-SCNC: 5 MMOL/L (ref 3.5–5.3)
PROT SERPL-MCNC: 6.2 G/DL (ref 6.4–8.2)
RBC # BLD AUTO: 4.63 X10*6/UL (ref 4–5.2)
RSV RNA RESP QL NAA+PROBE: NOT DETECTED
SARS-COV-2 RNA RESP QL NAA+PROBE: NOT DETECTED
SODIUM SERPL-SCNC: 141 MMOL/L (ref 136–145)
WBC # BLD AUTO: 10.7 X10*3/UL (ref 4.4–11.3)

## 2024-01-15 PROCEDURE — 71046 X-RAY EXAM CHEST 2 VIEWS: CPT

## 2024-01-15 PROCEDURE — 85025 COMPLETE CBC W/AUTO DIFF WBC: CPT | Performed by: NURSE PRACTITIONER

## 2024-01-15 PROCEDURE — 80053 COMPREHEN METABOLIC PANEL: CPT | Performed by: NURSE PRACTITIONER

## 2024-01-15 PROCEDURE — 94640 AIRWAY INHALATION TREATMENT: CPT

## 2024-01-15 PROCEDURE — 2500000002 HC RX 250 W HCPCS SELF ADMINISTERED DRUGS (ALT 637 FOR MEDICARE OP, ALT 636 FOR OP/ED): Performed by: NURSE PRACTITIONER

## 2024-01-15 PROCEDURE — 2500000004 HC RX 250 GENERAL PHARMACY W/ HCPCS (ALT 636 FOR OP/ED): Performed by: STUDENT IN AN ORGANIZED HEALTH CARE EDUCATION/TRAINING PROGRAM

## 2024-01-15 PROCEDURE — 84484 ASSAY OF TROPONIN QUANT: CPT | Performed by: NURSE PRACTITIONER

## 2024-01-15 PROCEDURE — 83690 ASSAY OF LIPASE: CPT | Performed by: NURSE PRACTITIONER

## 2024-01-15 PROCEDURE — 36415 COLL VENOUS BLD VENIPUNCTURE: CPT | Performed by: NURSE PRACTITIONER

## 2024-01-15 PROCEDURE — 83735 ASSAY OF MAGNESIUM: CPT | Performed by: NURSE PRACTITIONER

## 2024-01-15 PROCEDURE — 96375 TX/PRO/DX INJ NEW DRUG ADDON: CPT | Performed by: STUDENT IN AN ORGANIZED HEALTH CARE EDUCATION/TRAINING PROGRAM

## 2024-01-15 PROCEDURE — 83880 ASSAY OF NATRIURETIC PEPTIDE: CPT | Performed by: NURSE PRACTITIONER

## 2024-01-15 PROCEDURE — 71046 X-RAY EXAM CHEST 2 VIEWS: CPT | Performed by: RADIOLOGY

## 2024-01-15 PROCEDURE — 93005 ELECTROCARDIOGRAM TRACING: CPT

## 2024-01-15 PROCEDURE — 96365 THER/PROPH/DIAG IV INF INIT: CPT | Performed by: STUDENT IN AN ORGANIZED HEALTH CARE EDUCATION/TRAINING PROGRAM

## 2024-01-15 PROCEDURE — 73560 X-RAY EXAM OF KNEE 1 OR 2: CPT | Mod: RT

## 2024-01-15 PROCEDURE — 2500000001 HC RX 250 WO HCPCS SELF ADMINISTERED DRUGS (ALT 637 FOR MEDICARE OP): Performed by: STUDENT IN AN ORGANIZED HEALTH CARE EDUCATION/TRAINING PROGRAM

## 2024-01-15 PROCEDURE — 73560 X-RAY EXAM OF KNEE 1 OR 2: CPT | Mod: RIGHT SIDE | Performed by: RADIOLOGY

## 2024-01-15 PROCEDURE — 87637 SARSCOV2&INF A&B&RSV AMP PRB: CPT | Performed by: NURSE PRACTITIONER

## 2024-01-15 PROCEDURE — 99284 EMERGENCY DEPT VISIT MOD MDM: CPT | Performed by: STUDENT IN AN ORGANIZED HEALTH CARE EDUCATION/TRAINING PROGRAM

## 2024-01-15 RX ORDER — ALUMINUM HYDROXIDE, MAGNESIUM HYDROXIDE, AND SIMETHICONE 1200; 120; 1200 MG/30ML; MG/30ML; MG/30ML
10 SUSPENSION ORAL ONCE
Status: COMPLETED | OUTPATIENT
Start: 2024-01-15 | End: 2024-01-15

## 2024-01-15 RX ORDER — ALBUTEROL SULFATE 0.83 MG/ML
2.5 SOLUTION RESPIRATORY (INHALATION) ONCE
Status: COMPLETED | OUTPATIENT
Start: 2024-01-15 | End: 2024-01-15

## 2024-01-15 RX ORDER — PREDNISONE 20 MG/1
TABLET ORAL
Qty: 8 TABLET | Refills: 0 | Status: SHIPPED | OUTPATIENT
Start: 2024-01-15 | End: 2024-02-26 | Stop reason: WASHOUT

## 2024-01-15 RX ORDER — MAGNESIUM SULFATE HEPTAHYDRATE 40 MG/ML
2 INJECTION, SOLUTION INTRAVENOUS ONCE
Status: COMPLETED | OUTPATIENT
Start: 2024-01-15 | End: 2024-01-15

## 2024-01-15 RX ORDER — IPRATROPIUM BROMIDE AND ALBUTEROL SULFATE 2.5; .5 MG/3ML; MG/3ML
3 SOLUTION RESPIRATORY (INHALATION) ONCE
Status: COMPLETED | OUTPATIENT
Start: 2024-01-15 | End: 2024-01-15

## 2024-01-15 RX ORDER — LEVOFLOXACIN 750 MG/1
TABLET ORAL
Qty: 5 TABLET | Refills: 0 | Status: SHIPPED | OUTPATIENT
Start: 2024-01-15 | End: 2024-02-26 | Stop reason: WASHOUT

## 2024-01-15 RX ADMIN — METHYLPREDNISOLONE SODIUM SUCCINATE 125 MG: 125 INJECTION, POWDER, FOR SOLUTION INTRAMUSCULAR; INTRAVENOUS at 22:06

## 2024-01-15 RX ADMIN — ALUMINUM HYDROXIDE, MAGNESIUM HYDROXIDE, AND SIMETHICONE 10 ML: 200; 200; 20 SUSPENSION ORAL at 23:35

## 2024-01-15 RX ADMIN — MAGNESIUM SULFATE HEPTAHYDRATE 2 G: 40 INJECTION, SOLUTION INTRAVENOUS at 22:06

## 2024-01-15 RX ADMIN — IPRATROPIUM BROMIDE AND ALBUTEROL SULFATE 3 ML: .5; 3 SOLUTION RESPIRATORY (INHALATION) at 20:36

## 2024-01-15 RX ADMIN — ALBUTEROL SULFATE 2.5 MG: 2.5 SOLUTION RESPIRATORY (INHALATION) at 20:36

## 2024-01-15 ASSESSMENT — PAIN - FUNCTIONAL ASSESSMENT: PAIN_FUNCTIONAL_ASSESSMENT: 0-10

## 2024-01-15 ASSESSMENT — LIFESTYLE VARIABLES
EVER HAD A DRINK FIRST THING IN THE MORNING TO STEADY YOUR NERVES TO GET RID OF A HANGOVER: NO
REASON UNABLE TO ASSESS: NO
HAVE PEOPLE ANNOYED YOU BY CRITICIZING YOUR DRINKING: NO
HAVE YOU EVER FELT YOU SHOULD CUT DOWN ON YOUR DRINKING: NO
EVER FELT BAD OR GUILTY ABOUT YOUR DRINKING: NO

## 2024-01-15 ASSESSMENT — PAIN SCALES - GENERAL
PAINLEVEL_OUTOF10: 0 - NO PAIN

## 2024-01-15 ASSESSMENT — COLUMBIA-SUICIDE SEVERITY RATING SCALE - C-SSRS
1. IN THE PAST MONTH, HAVE YOU WISHED YOU WERE DEAD OR WISHED YOU COULD GO TO SLEEP AND NOT WAKE UP?: NO
6. HAVE YOU EVER DONE ANYTHING, STARTED TO DO ANYTHING, OR PREPARED TO DO ANYTHING TO END YOUR LIFE?: NO
2. HAVE YOU ACTUALLY HAD ANY THOUGHTS OF KILLING YOURSELF?: NO

## 2024-01-15 NOTE — ED TRIAGE NOTES
This patient was seen and examined in triage    HPI:  Patient is a healthy nontoxic-appearing 58-year-old female with past medical history of anxiety allergic rhinitis, asthma, bipolar affective disorder, cauda equina syndrome, COPD, epigastric pain, hypertension, gastroenteritis, cardiac failure, hyperlipidemia, fibromyalgia, type 2 diabetes, presents to the emergency today for complaint of upper abdominal pain and shortness of breath with cough.  Patient states symptoms have been persistent over the past several days.  Patient states shortness of breath is worse with exertion slightly improves at rest.  Patient states she has had to be admitted to the hospital for for asthma is concerned she may be experiencing an asthma exacerbation.  Patient denies any chest pain or discomfort in the upper abdomen, denies any injuries trauma or falls, nausea, vomiting, diarrhea or constipation, urinary complaints, fever, shaking, or chills.    Focused PE:  Gen: Well-appearing, not in acute distress  Cardiovascular: Regular rate, normal rhythm, no murmur, no gallop  Respiratory: Slightly diminished lung sounds auscultated throughout the bilateral lung bases, left upper lung field does have wheezing, all remaining lung fields are clear      Abdomen: Minimal upper abdominal tenderness upon palpation,  physical exam may be limited by patient positioning sitting up in a chair  Neuro:  Alert and Oriented, speech clear and coherent    Plan:  Lab work ordered from triage including EKG, chest x-ray and breathing treatment.    For the remainder the patient's work-up and ED course, please see the main ED provider note.  We discussed need for diagnostic testing including lab studies and imaging.  We also discussed that they may be asked to wait in the waiting room while these test are pending.  They understand that if they choose to leave without having the testing completed or resulted that we cannot rule out acute life-threatening  illnesses and the risks involved to lead to worsening condition, permanent disability or even death.
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
## 2024-02-07 ENCOUNTER — OFFICE VISIT (OUTPATIENT)
Dept: GASTROENTEROLOGY | Facility: CLINIC | Age: 59
End: 2024-02-07
Payer: COMMERCIAL

## 2024-02-07 VITALS
WEIGHT: 253.9 LBS | BODY MASS INDEX: 46.72 KG/M2 | HEIGHT: 62 IN | TEMPERATURE: 98.1 F | DIASTOLIC BLOOD PRESSURE: 85 MMHG | SYSTOLIC BLOOD PRESSURE: 132 MMHG

## 2024-02-07 DIAGNOSIS — M32.8 OTHER FORMS OF SYSTEMIC LUPUS ERYTHEMATOSUS, UNSPECIFIED ORGAN INVOLVEMENT STATUS (MULTI): ICD-10-CM

## 2024-02-07 DIAGNOSIS — B37.9 CANDIDA INFECTION: ICD-10-CM

## 2024-02-07 DIAGNOSIS — R10.84 GENERALIZED ABDOMINAL PAIN: ICD-10-CM

## 2024-02-07 DIAGNOSIS — K21.9 GASTROESOPHAGEAL REFLUX DISEASE WITHOUT ESOPHAGITIS: Primary | ICD-10-CM

## 2024-02-07 PROCEDURE — 99214 OFFICE O/P EST MOD 30 MIN: CPT | Performed by: INTERNAL MEDICINE

## 2024-02-07 PROCEDURE — 3079F DIAST BP 80-89 MM HG: CPT | Performed by: INTERNAL MEDICINE

## 2024-02-07 PROCEDURE — 3075F SYST BP GE 130 - 139MM HG: CPT | Performed by: INTERNAL MEDICINE

## 2024-02-07 RX ORDER — DOXYLAMINE SUCCINATE 25 MG
TABLET ORAL 2 TIMES DAILY
Qty: 30 G | Refills: 3 | Status: SHIPPED | OUTPATIENT
Start: 2024-02-07 | End: 2024-02-26 | Stop reason: WASHOUT

## 2024-02-07 RX ORDER — FAMOTIDINE 40 MG/1
40 TABLET, FILM COATED ORAL NIGHTLY
Qty: 30 TABLET | Refills: 11 | Status: SHIPPED | OUTPATIENT
Start: 2024-02-07

## 2024-02-07 RX ORDER — PANTOPRAZOLE SODIUM 40 MG/1
40 TABLET, DELAYED RELEASE ORAL DAILY
Qty: 30 TABLET | Refills: 11 | Status: SHIPPED | OUTPATIENT
Start: 2024-02-07 | End: 2025-02-06

## 2024-02-07 ASSESSMENT — PAIN SCALES - GENERAL: PAINLEVEL: 10-WORST PAIN EVER

## 2024-02-07 NOTE — PATIENT INSTRUCTIONS
It was good to see you. We will order a CT scan to check the abdomen to evaluate the hernia and causes of pain. You should get the gastric emptying study. Follow up in 4 months.

## 2024-02-07 NOTE — PROGRESS NOTES
History Of Present Illness  Josemanuel Reinoso is a 58 y.o. female presenting with abdominal pain.    .She has morbid obesity and has a longstanding history of abdominal pain, GERD and has a chart diagnosis of gastroparesis.  She is s/p ventral hernia repair in 2018. She reportedly had an abnormal gastric emptying study at an outside hospital but we cannot obtain the report.  She has been on chronic PPI therapy most recently pantoprazole 40 mg once a day.  She complains of frequent bouts of epigastric pain, nausea and regurgitation.  Symptoms are worse with bending over.  She also has had mid abdominal pain that has been attributed to recurrence of a ventral hernia.  Her weight has been stable.  Bowel movements have been fairly regular.  Her primary care physician gave her a prescription for Reglan but she has not taken it yet.  She also notes a intertriginous rash below her abdominal pannus.  She complained of generalized pain in both her trunk and extremities and has been diagnosed in the past with fibromyalgia as well as SLE.  She would like to see rheumatology.     Past Medical History  Past Medical History:   Diagnosis Date    Personal history of other diseases of the circulatory system     History of hypertension    Personal history of other endocrine, nutritional and metabolic disease     History of diabetes mellitus       Surgical History  Past Surgical History:   Procedure Laterality Date    MR HEAD ANGIO WO IV CONTRAST  7/14/2019    MR HEAD ANGIO WO IV [facility]    MR HEAD ANGIO WO IV CONTRAST  7/14/2019    MR HEAD ANGIO WO IV CONTRAST [facility]    MR NECK ANGIO WO IV CONTRAST  7/14/2019    MR NECK ANGIO WO IV CONTRAST [facility]        Social History  She reports that she has been smoking cigarettes. She has never used smokeless tobacco. No history on file for alcohol use and drug use.    Family History  No family history on file.     Allergies  Azithromycin,  Lamotrigine, Doxycycline, Erythromycin, Metformin, Penicillins, Cephalexin, Ciprocinonide, Codeine, Fluticasone propion-salmeterol, Ibuprofen, Pregabalin, Tramadol, Ciprofloxacin, Estradiol, and Nickel    Last Recorded Vitals  There were no vitals taken for this visit.       Physical Exam  Vitals reviewed.   Constitutional:       Appearance: Normal appearance.   Cardiovascular:      Rate and Rhythm: Normal rate and regular rhythm.   Pulmonary:      Effort: Pulmonary effort is normal.      Breath sounds: Normal breath sounds.   Abdominal:      Palpations: Abdomen is soft. There is no mass.      Tenderness: There is no abdominal tenderness.      Hernia: A hernia is present.   Skin:     Findings: Rash present.      Comments: Intertriginous   Neurological:      Mental Status: She is alert.           Labs  Lab Results   Component Value Date    GLUCOSE 119 (H) 01/15/2024    CALCIUM 8.4 (L) 01/15/2024     01/15/2024    K 5.0 01/15/2024    CO2 27 01/15/2024     01/15/2024    BUN 10 01/15/2024    CREATININE 0.86 01/15/2024     Lab Results   Component Value Date    WBC 10.7 01/15/2024    HGB 12.0 01/15/2024    HCT 38.3 01/15/2024    MCV 83 01/15/2024     01/15/2024     01/15/2024    K 5.0 01/15/2024     01/15/2024    CO2 27 01/15/2024    BUN 10 01/15/2024    CREATININE 0.86 01/15/2024    CALCIUM 8.4 (L) 01/15/2024    PROT 6.2 (L) 01/15/2024    BILITOT 0.3 01/15/2024    ALKPHOS 79 01/15/2024    ALT 11 01/15/2024    AST 19 01/15/2024    GLUCOSE 119 (H) 01/15/2024           Imaging          ASSESSMENT & PLAN  Problem List Items Addressed This Visit             ICD-10-CM    Gastroesophageal reflux disease without esophagitis - Primary K21.9    Relevant Medications    pantoprazole (ProtoNix) 40 mg EC tablet    famotidine (Pepcid) 40 mg tablet    Abdominal pain R10.9    Relevant Orders    CT abdomen pelvis w IV contrast    Systemic lupus erythematosus (CMS/HCC) M32.9    Relevant Orders    Referral to  Rheumatology     Other Visit Diagnoses         Codes    Candida infection     B37.9    Relevant Medications    miconazole (Micatin) 2 % cream          We had a long discussion about her pain and will proceed with a CT scan to further evaluate.  She has a history of a colon adenoma with a fair prep at her last exam in 2023 and we will schedule her for a colonoscopy later this year.  She was given a prescription for miconazole cream.  I will have her see rheumatology per her request.  Follow-up in 4 months.    I spent 30 minutes in the professional and overall care of this patient.      Jose Francisco Suresh MD

## 2024-02-22 ENCOUNTER — HOSPITAL ENCOUNTER (OUTPATIENT)
Dept: RADIOLOGY | Facility: HOSPITAL | Age: 59
Discharge: HOME | End: 2024-02-22
Payer: COMMERCIAL

## 2024-02-22 DIAGNOSIS — R10.84 GENERALIZED ABDOMINAL PAIN: ICD-10-CM

## 2024-02-22 PROCEDURE — 74176 CT ABD & PELVIS W/O CONTRAST: CPT

## 2024-02-22 PROCEDURE — 74176 CT ABD & PELVIS W/O CONTRAST: CPT | Performed by: RADIOLOGY

## 2024-02-23 DIAGNOSIS — D12.6 ADENOMATOUS POLYP OF COLON, UNSPECIFIED PART OF COLON: Primary | ICD-10-CM

## 2024-02-24 PROBLEM — M25.511 ACUTE PAIN OF RIGHT SHOULDER: Status: ACTIVE | Noted: 2024-01-23

## 2024-02-24 PROBLEM — B37.9 CANDIDIASIS: Status: ACTIVE | Noted: 2024-02-07

## 2024-02-24 PROBLEM — M25.512 ACUTE PAIN OF LEFT SHOULDER: Status: ACTIVE | Noted: 2024-01-23

## 2024-02-24 PROBLEM — Z86.79 HISTORY OF HYPERTENSION: Status: RESOLVED | Noted: 2023-03-10 | Resolved: 2024-02-24

## 2024-02-24 PROBLEM — E11.9 DIABETES MELLITUS WITHOUT COMPLICATION (MULTI): Status: RESOLVED | Noted: 2022-07-08 | Resolved: 2024-02-24

## 2024-02-24 PROBLEM — M54.6 ACUTE BILATERAL THORACIC BACK PAIN: Status: ACTIVE | Noted: 2024-01-23

## 2024-02-24 PROBLEM — R11.2 NAUSEA & VOMITING: Status: RESOLVED | Noted: 2023-09-22 | Resolved: 2024-02-24

## 2024-02-26 ENCOUNTER — ANCILLARY PROCEDURE (OUTPATIENT)
Dept: CARDIOLOGY | Facility: CLINIC | Age: 59
End: 2024-02-26
Payer: COMMERCIAL

## 2024-02-26 ENCOUNTER — OFFICE VISIT (OUTPATIENT)
Dept: CARDIOLOGY | Facility: CLINIC | Age: 59
End: 2024-02-26
Payer: COMMERCIAL

## 2024-02-26 VITALS
SYSTOLIC BLOOD PRESSURE: 160 MMHG | HEART RATE: 78 BPM | WEIGHT: 249 LBS | HEIGHT: 62 IN | DIASTOLIC BLOOD PRESSURE: 82 MMHG | BODY MASS INDEX: 45.82 KG/M2 | OXYGEN SATURATION: 94 %

## 2024-02-26 DIAGNOSIS — I50.9 CHRONIC HEART FAILURE, UNSPECIFIED HEART FAILURE TYPE (MULTI): ICD-10-CM

## 2024-02-26 DIAGNOSIS — R07.9 CHEST PAIN, UNSPECIFIED TYPE: Primary | ICD-10-CM

## 2024-02-26 DIAGNOSIS — E78.5 HYPERLIPIDEMIA, UNSPECIFIED HYPERLIPIDEMIA TYPE: ICD-10-CM

## 2024-02-26 DIAGNOSIS — I49.3 PVC (PREMATURE VENTRICULAR CONTRACTION): ICD-10-CM

## 2024-02-26 DIAGNOSIS — R00.2 PALPITATIONS: ICD-10-CM

## 2024-02-26 DIAGNOSIS — R07.9 CHEST PAIN, UNSPECIFIED TYPE: ICD-10-CM

## 2024-02-26 DIAGNOSIS — I50.32 HYPERTENSIVE HEART DISEASE WITH CHRONIC DIASTOLIC CONGESTIVE HEART FAILURE (MULTI): ICD-10-CM

## 2024-02-26 DIAGNOSIS — I11.0 HYPERTENSIVE HEART DISEASE WITH CHRONIC DIASTOLIC CONGESTIVE HEART FAILURE (MULTI): ICD-10-CM

## 2024-02-26 DIAGNOSIS — I27.20 PULMONARY HYPERTENSION (MULTI): ICD-10-CM

## 2024-02-26 DIAGNOSIS — I10 ESSENTIAL HYPERTENSION: Chronic | ICD-10-CM

## 2024-02-26 PROCEDURE — 99204 OFFICE O/P NEW MOD 45 MIN: CPT | Performed by: STUDENT IN AN ORGANIZED HEALTH CARE EDUCATION/TRAINING PROGRAM

## 2024-02-26 PROCEDURE — 3077F SYST BP >= 140 MM HG: CPT | Performed by: STUDENT IN AN ORGANIZED HEALTH CARE EDUCATION/TRAINING PROGRAM

## 2024-02-26 PROCEDURE — 3079F DIAST BP 80-89 MM HG: CPT | Performed by: STUDENT IN AN ORGANIZED HEALTH CARE EDUCATION/TRAINING PROGRAM

## 2024-02-26 PROCEDURE — 93244 EXT ECG>48HR<7D REV&INTERPJ: CPT | Performed by: INTERNAL MEDICINE

## 2024-02-26 RX ORDER — DOCUSATE SODIUM 100 MG/1
100 CAPSULE, LIQUID FILLED ORAL 2 TIMES DAILY PRN
COMMUNITY
Start: 2024-02-15

## 2024-02-26 RX ORDER — BUDESONIDE 90 UG/1
1-2 AEROSOL, POWDER RESPIRATORY (INHALATION)
COMMUNITY
Start: 2024-02-19

## 2024-02-26 ASSESSMENT — PAIN SCALES - GENERAL: PAINLEVEL: 8

## 2024-02-26 NOTE — PROGRESS NOTES
Referred by Dr. Miller ref. provider found for NPV for 2nd opinion on enlarged heart     History Of Present Illness:    Josemanuel Reinoso is a 58 y.o. female past med history notable for COPD/asthma, lupus, HFpEF, type 2 diabetes last A1c 6.9, fibromyalgia, hyperlipidemia who presents to clinic for a secondary evaluation of palpitations chest pain shortness of breath.  Patient has been worked up previously in the past through [facility] namely last year she underwent regadenoson nuclear stress test for chest discomfort which came back as negative.  She had echocardiogram In 2021 which showed normal ejection fraction at 60% and no evidence of significant valvular pathology.  Apparently several years ago she did undergo catheterization which showed no substantial disease.  She had a calcium score of 0 in 2023.    She complains of feeling skips and runs in her chest.  She feels fluttering in her heart.  Patient wore a Zio patch 7/18/2023 through 7/31/2023 at which time her average heart rate was 82 bpm she sustained 37 sustained and nonsustained supraventricular tachycardia runs longest lasting 8 beats.  [place] of PVCs was less than 1%.  Patient also describes chest discomfort which comes and goes.  Sometimes related to exertion although patient has had limited functional activity due to her chronic physical ailments.  She also at times endorses shortness of breath when laying flat and lower extreme edema although this is improved since she has been on chlorthalidone.    Baseline EKG shows sinus rhythm with prominent R waves in leads I and aVL consistent with LVH.  Blood pressure today is 160/82 with heart rate of 78.  Weight is 249 pounds.  Patient states her breathing was better when she uses oxygen at night.    Interestingly, patient did undergo a CT abdomen and pelvis and noted to have biliary sludge and small gallstones.  Patient also has symptoms when she eats but states that this discomfort is different than her chest  discomfort.  She is slated to see GI for further workup regarding biliary symptoms.            Past Medical History:  She has a past medical history of Personal history of other diseases of the circulatory system and Personal history of other endocrine, nutritional and metabolic disease.    Past Surgical History:  She has a past surgical history that includes MR angio head wo IV contrast (7/14/2019); MR angio neck wo IV contrast (7/14/2019); and MR angio head wo IV contrast (7/14/2019).      Social History:  She reports that she has been smoking cigarettes. She has never used smokeless tobacco. She reports that she does not drink alcohol and does not use drugs.    Family History:  No family history on file.     Allergies:  Azithromycin, Cephalexin, Ciprocinonide, Ciprofloxacin, Codeine, Doxycycline, Erythromycin, Fluticasone propion-salmeterol, Ibuprofen, Lamotrigine, Metformin, Penicillins, Tramadol, Nickel, Pregabalin, and Estradiol    Outpatient Medications:  Current Outpatient Medications   Medication Instructions    albuterol 90 mcg/actuation inhaler inhale 1 to 2 puffs by mouth every 4 hours Inhalation    albuterol 2.5 mg    alum-mag hydroxide-simeth (Maalox MAX) 400-400-40 mg/5 mL suspension Mag Hydrox/Al Hydrox/Simeth* (MAALOX [id number] ML) 355 ML ORAL.SUSP Active 30 ML PO 3 TIMES DAILY 2 15 Genevieve 3rd, 2021 9:50am    amitriptyline (Elavil) 10 mg tablet 1 tablet, oral, Nightly    aspirin 81 mg, oral, Daily RT    azelastine (Astelin) 137 mcg (0.1 %) nasal spray 1 spray, Does not apply, 2 times daily    bisacodyl (Dulcolax) 5 mg EC tablet take 1 tablet by mouth once daily if needed for constipation    cetirizine (ZYRTEC) 10 mg, oral    chlorthalidone (HYGROTON) 25 mg, oral, Daily RT    chlorzoxazone (Parafon Forte) 250 mg tablet oral    cholecalciferol (Vitamin D-3) 25 MCG (1000 UT) capsule 3 capsules, oral, Daily RT    clotrimazole (Lotrimin) 1 % cream apply 1 APPLICATION to affected area twice a day if needed     colchicine, gout, 0.6 mg tablet 1 tablet    cyanocobalamin (Vitamin B-12) 1,000 mcg tablet 1 tablet, oral, Daily RT    dexlansoprazole (Dexilant) 60 mg DR capsule oral    docusate sodium (COLACE) 100 mg, oral, 2 times daily    empagliflozin (Jardiance) 10 mg 1 tablet, oral, Daily with breakfast    etodolac (Lodine) 400 mg tablet Every 12 hours    famotidine (PEPCID) 40 mg, oral, Nightly    fexofenadine (ALLEGRA) 180 mg, oral, Daily RT    fluticasone (Flonase) 50 mcg/actuation nasal spray 1 spray, Does not apply, 2 times daily    fluticasone-umeclidin-vilanter (Trelegy Ellipta) 200-62.5-25 mcg blister with device 1 puff, inhalation, Daily RT    hydroxychloroquine (Plaquenil) 200 mg tablet Hydroxychloroquine Sulfate* (PLAQUENIL [id number] MG) 200 MG TABLET Active 200 MG PO EVERY DAY    hydrOXYzine pamoate (Vistaril) 50 mg capsule oral    hyoscyamine 0.125 mg disintegrating tablet     ipratropium-albuteroL (Duo-Neb) 0.5-2.5 mg/3 mL nebulizer solution 3 mL, inhalation, 3 times daily    ivabradine (CORLANOR) 7.5 mg, oral, Once, Take 1 hour prior to coronary cta    lidocaine (Lidoderm) 5 % patch transdermal    melatonin 5 mg, oral, Daily PRN    meloxicam (MOBIC) 15 mg, oral, Daily RT    metoclopramide (Reglan) 5 mg tablet 1 tablet, oral, Daily RT    metoprolol succinate XL (TOPROL-XL) 50 mg, oral, Daily RT    mirabegron (Mybetriq) 25 mg tablet extended release 24 hr 24 hr tablet Mirabegron ([id number] MG) 25 MG TAB.ER.24H Active 25 MG PO EVERY DAY    montelukast (SINGULAIR) 10 mg, oral    naloxone (NARCAN) 4 mg    ondansetron (Zofran) 4 mg tablet 1 tablet, oral, Every 6 hours PRN    oxybutynin XL (Ditropan-XL) 10 mg 24 hr tablet Every 24 hours    oxyCODONE (Roxicodone) 5 mg immediate release tablet 1 tablet, oral, 2 times daily    pantoprazole (PROTONIX) 40 mg, oral, Daily, Do not crush, chew, or split.    Pulmicort Flexhaler 90 mcg/actuation inhaler 1-2 puffs, inhalation, 2 times daily RT    rOPINIRole (REQUIP) 0.5 mg,  "oral, 3 times daily    rosuvastatin (Crestor) 20 mg tablet     sennosides (SENOKOT) 8.6 mg, oral    white petrolatum (Aquaphor Healing) 41 % ointment ointment Topical        Last Recorded Vitals:  Vitals:    02/26/24 0856   BP: 160/82   BP Location: Right arm   Patient Position: Sitting   BP Cuff Size: Adult   Pulse: 78   SpO2: 94%   Weight: 113 kg (249 lb)   Height: 1.575 m (5' 2\")       Physical Exam:  General: No acute distress,  A&O x3, obese female  Skin: Warm and dry  Neck: JVD is not elevated  ENT: Moist mucous membranes no lesions appreciated  Pulmonary: CTAB  Cards: Regular rate rhythm, no murmurs gallops or rubs appreciated normal S1-S2  Abdomen: Soft nontender nondistended  Extremities: Trace edema noted  Psych: Appropriate mood and affect       Last Labs:  CBC -  Lab Results   Component Value Date    WBC 10.7 01/15/2024    HGB 12.0 01/15/2024    HCT 38.3 01/15/2024    MCV 83 01/15/2024     01/15/2024       CMP -  Lab Results   Component Value Date    CALCIUM 8.4 (L) 01/15/2024    PROT 6.2 (L) 01/15/2024    ALBUMIN 3.6 01/15/2024    AST 19 01/15/2024    ALT 11 01/15/2024    ALKPHOS 79 01/15/2024    BILITOT 0.3 01/15/2024       LIPID PANEL -   No results found for: \"CHOL\", \"TRIG\", \"HDL\", \"CHHDL\", \"LDLF\", \"VLDL\", \"NHDL\"    RENAL FUNCTION PANEL -   Lab Results   Component Value Date    GLUCOSE 119 (H) 01/15/2024     01/15/2024    K 5.0 01/15/2024     01/15/2024    CO2 27 01/15/2024    ANIONGAP 12 01/15/2024    BUN 10 01/15/2024    CREATININE 0.86 01/15/2024    CALCIUM 8.4 (L) 01/15/2024    ALBUMIN 3.6 01/15/2024        Lab Results   Component Value Date    BNP 33 01/15/2024    HGBA1C 6.4 08/10/2023    HGBA1C 6.9 (H) 05/26/2023       Assessment/Plan     1.  Palpitations:  We mostly consistent with PVCs.  She is complaining symptoms have worsening and there is no associated shortness of breath.  She does not appear to be heart failure by physical exam.  Baseline EKG is reviewed showing the " presence of LVH.  She is currently on metoprolol 50 mg once a day.  For further assessment we will provide a 7-day Holter monitor to determine if patient requires additional beta-blockade given MI patient's underlying COPD/asthma condition.    2.  Chest pain: Atypical: Pain is different in association with food or lupus flare.  She has had previous stress test 1 year ago although this was a regadenoson nuclear stress test.  Her calcium score is 0 which is limited in the context of chronic autoimmune disease.  Risk factors include hypertension, hyperlipidemia and type 2 diabetes.  Our plan is to check fasting lipid panel.  In light of ongoing abdominal discomfort and likely need for gallbladder intervention, we will obtain coronary CTA to define coronary anatomy.  Further recs to follow based on study findings.    3.  HFpEF: Chronic.  Volume status appears to be relatively controlled with Jardiance 10 mg daily and chlorthalidone.    4.  COPD/asthma: Management as per pulmonology.  Will try to limit exposure to beta-blockers in this context.    (This note was generated with voice recognition software and may contain errors including spelling, grammar, syntax and missed recognition of what was dictated, of which may not have been fully corrected)     Osito Browning MD PhD

## 2024-02-26 NOTE — PATIENT INSTRUCTIONS
Coronary CTA  7-day Holter monitor  Check fasting lipid panel  Return to clinic once testing is completed

## 2024-03-09 DIAGNOSIS — K58.2 IRRITABLE BOWEL SYNDROME WITH BOTH CONSTIPATION AND DIARRHEA: Primary | ICD-10-CM

## 2024-03-14 ENCOUNTER — HOSPITAL ENCOUNTER (OUTPATIENT)
Facility: HOSPITAL | Age: 59
Setting detail: OBSERVATION
Discharge: HOME | End: 2024-03-15
Attending: STUDENT IN AN ORGANIZED HEALTH CARE EDUCATION/TRAINING PROGRAM | Admitting: STUDENT IN AN ORGANIZED HEALTH CARE EDUCATION/TRAINING PROGRAM
Payer: COMMERCIAL

## 2024-03-14 ENCOUNTER — APPOINTMENT (OUTPATIENT)
Dept: RADIOLOGY | Facility: HOSPITAL | Age: 59
End: 2024-03-14
Payer: COMMERCIAL

## 2024-03-14 ENCOUNTER — HOSPITAL ENCOUNTER (OUTPATIENT)
Dept: CARDIOLOGY | Facility: HOSPITAL | Age: 59
Discharge: HOME | End: 2024-03-14
Payer: COMMERCIAL

## 2024-03-14 DIAGNOSIS — R07.9 CHEST PAIN, UNSPECIFIED TYPE: Primary | ICD-10-CM

## 2024-03-14 DIAGNOSIS — R07.89 OTHER CHEST PAIN: ICD-10-CM

## 2024-03-14 LAB
ALBUMIN SERPL BCP-MCNC: 3.9 G/DL (ref 3.4–5)
ALP SERPL-CCNC: 83 U/L (ref 33–110)
ALT SERPL W P-5'-P-CCNC: 9 U/L (ref 7–45)
ANION GAP SERPL CALC-SCNC: 13 MMOL/L (ref 10–20)
AST SERPL W P-5'-P-CCNC: 15 U/L (ref 9–39)
BASOPHILS # BLD AUTO: 0.06 X10*3/UL (ref 0–0.1)
BASOPHILS NFR BLD AUTO: 0.4 %
BILIRUB SERPL-MCNC: 0.3 MG/DL (ref 0–1.2)
BNP SERPL-MCNC: 115 PG/ML (ref 0–99)
BUN SERPL-MCNC: 9 MG/DL (ref 6–23)
CALCIUM SERPL-MCNC: 9 MG/DL (ref 8.6–10.3)
CARDIAC TROPONIN I PNL SERPL HS: 5 NG/L (ref 0–13)
CARDIAC TROPONIN I PNL SERPL HS: 5 NG/L (ref 0–13)
CHLORIDE SERPL-SCNC: 107 MMOL/L (ref 98–107)
CO2 SERPL-SCNC: 26 MMOL/L (ref 21–32)
CREAT SERPL-MCNC: 0.75 MG/DL (ref 0.5–1.05)
EGFRCR SERPLBLD CKD-EPI 2021: >90 ML/MIN/1.73M*2
EOSINOPHIL # BLD AUTO: 0.06 X10*3/UL (ref 0–0.7)
EOSINOPHIL NFR BLD AUTO: 0.4 %
ERYTHROCYTE [DISTWIDTH] IN BLOOD BY AUTOMATED COUNT: 17.2 % (ref 11.5–14.5)
FLUAV RNA RESP QL NAA+PROBE: NOT DETECTED
FLUBV RNA RESP QL NAA+PROBE: NOT DETECTED
GLUCOSE SERPL-MCNC: 100 MG/DL (ref 74–99)
HCT VFR BLD AUTO: 39.5 % (ref 36–46)
HGB BLD-MCNC: 12.4 G/DL (ref 12–16)
IMM GRANULOCYTES # BLD AUTO: 0.06 X10*3/UL (ref 0–0.7)
IMM GRANULOCYTES NFR BLD AUTO: 0.4 % (ref 0–0.9)
LYMPHOCYTES # BLD AUTO: 4.97 X10*3/UL (ref 1.2–4.8)
LYMPHOCYTES NFR BLD AUTO: 36.3 %
MAGNESIUM SERPL-MCNC: 2.19 MG/DL (ref 1.6–2.4)
MCH RBC QN AUTO: 25.8 PG (ref 26–34)
MCHC RBC AUTO-ENTMCNC: 31.4 G/DL (ref 32–36)
MCV RBC AUTO: 82 FL (ref 80–100)
MONOCYTES # BLD AUTO: 0.97 X10*3/UL (ref 0.1–1)
MONOCYTES NFR BLD AUTO: 7.1 %
NEUTROPHILS # BLD AUTO: 7.56 X10*3/UL (ref 1.2–7.7)
NEUTROPHILS NFR BLD AUTO: 55.4 %
NRBC BLD-RTO: 0 /100 WBCS (ref 0–0)
PLATELET # BLD AUTO: 329 X10*3/UL (ref 150–450)
POTASSIUM SERPL-SCNC: 4.1 MMOL/L (ref 3.5–5.3)
PROT SERPL-MCNC: 6.9 G/DL (ref 6.4–8.2)
RBC # BLD AUTO: 4.8 X10*6/UL (ref 4–5.2)
RSV RNA RESP QL NAA+PROBE: NOT DETECTED
SARS-COV-2 RNA RESP QL NAA+PROBE: NOT DETECTED
SODIUM SERPL-SCNC: 142 MMOL/L (ref 136–145)
WBC # BLD AUTO: 13.7 X10*3/UL (ref 4.4–11.3)

## 2024-03-14 PROCEDURE — 80053 COMPREHEN METABOLIC PANEL: CPT | Performed by: NURSE PRACTITIONER

## 2024-03-14 PROCEDURE — 71046 X-RAY EXAM CHEST 2 VIEWS: CPT

## 2024-03-14 PROCEDURE — 84484 ASSAY OF TROPONIN QUANT: CPT | Performed by: NURSE PRACTITIONER

## 2024-03-14 PROCEDURE — 87634 RSV DNA/RNA AMP PROBE: CPT | Performed by: NURSE PRACTITIONER

## 2024-03-14 PROCEDURE — G0378 HOSPITAL OBSERVATION PER HR: HCPCS

## 2024-03-14 PROCEDURE — 99222 1ST HOSP IP/OBS MODERATE 55: CPT | Performed by: STUDENT IN AN ORGANIZED HEALTH CARE EDUCATION/TRAINING PROGRAM

## 2024-03-14 PROCEDURE — 36415 COLL VENOUS BLD VENIPUNCTURE: CPT | Performed by: NURSE PRACTITIONER

## 2024-03-14 PROCEDURE — 2500000004 HC RX 250 GENERAL PHARMACY W/ HCPCS (ALT 636 FOR OP/ED): Performed by: STUDENT IN AN ORGANIZED HEALTH CARE EDUCATION/TRAINING PROGRAM

## 2024-03-14 PROCEDURE — 83880 ASSAY OF NATRIURETIC PEPTIDE: CPT | Performed by: NURSE PRACTITIONER

## 2024-03-14 PROCEDURE — 2500000001 HC RX 250 WO HCPCS SELF ADMINISTERED DRUGS (ALT 637 FOR MEDICARE OP): Performed by: STUDENT IN AN ORGANIZED HEALTH CARE EDUCATION/TRAINING PROGRAM

## 2024-03-14 PROCEDURE — 71046 X-RAY EXAM CHEST 2 VIEWS: CPT | Performed by: RADIOLOGY

## 2024-03-14 PROCEDURE — 99285 EMERGENCY DEPT VISIT HI MDM: CPT | Mod: 25 | Performed by: STUDENT IN AN ORGANIZED HEALTH CARE EDUCATION/TRAINING PROGRAM

## 2024-03-14 PROCEDURE — 83735 ASSAY OF MAGNESIUM: CPT | Performed by: NURSE PRACTITIONER

## 2024-03-14 PROCEDURE — 85025 COMPLETE CBC W/AUTO DIFF WBC: CPT | Performed by: NURSE PRACTITIONER

## 2024-03-14 PROCEDURE — 83036 HEMOGLOBIN GLYCOSYLATED A1C: CPT | Performed by: STUDENT IN AN ORGANIZED HEALTH CARE EDUCATION/TRAINING PROGRAM

## 2024-03-14 PROCEDURE — 96372 THER/PROPH/DIAG INJ SC/IM: CPT

## 2024-03-14 PROCEDURE — 93005 ELECTROCARDIOGRAM TRACING: CPT

## 2024-03-14 PROCEDURE — 87636 SARSCOV2 & INF A&B AMP PRB: CPT | Performed by: NURSE PRACTITIONER

## 2024-03-14 PROCEDURE — 2500000002 HC RX 250 W HCPCS SELF ADMINISTERED DRUGS (ALT 637 FOR MEDICARE OP, ALT 636 FOR OP/ED): Performed by: STUDENT IN AN ORGANIZED HEALTH CARE EDUCATION/TRAINING PROGRAM

## 2024-03-14 RX ORDER — MONTELUKAST SODIUM 10 MG/1
10 TABLET ORAL NIGHTLY
Status: DISCONTINUED | OUTPATIENT
Start: 2024-03-14 | End: 2024-03-15 | Stop reason: HOSPADM

## 2024-03-14 RX ORDER — ROSUVASTATIN CALCIUM 10 MG/1
20 TABLET, COATED ORAL DAILY
Status: DISCONTINUED | OUTPATIENT
Start: 2024-03-14 | End: 2024-03-15 | Stop reason: HOSPADM

## 2024-03-14 RX ORDER — ROPINIROLE 0.25 MG/1
0.5 TABLET, FILM COATED ORAL NIGHTLY
Status: DISCONTINUED | OUTPATIENT
Start: 2024-03-14 | End: 2024-03-15 | Stop reason: HOSPADM

## 2024-03-14 RX ORDER — OXYBUTYNIN CHLORIDE 5 MG/1
5 TABLET ORAL 2 TIMES DAILY
Status: DISCONTINUED | OUTPATIENT
Start: 2024-03-14 | End: 2024-03-15 | Stop reason: HOSPADM

## 2024-03-14 RX ORDER — AMITRIPTYLINE HYDROCHLORIDE 10 MG/1
10 TABLET, FILM COATED ORAL NIGHTLY
Status: DISCONTINUED | OUTPATIENT
Start: 2024-03-14 | End: 2024-03-15 | Stop reason: HOSPADM

## 2024-03-14 RX ORDER — CHLORTHALIDONE 25 MG/1
25 TABLET ORAL
Status: DISCONTINUED | OUTPATIENT
Start: 2024-03-15 | End: 2024-03-15 | Stop reason: HOSPADM

## 2024-03-14 RX ORDER — ERGOCALCIFEROL 1.25 MG/1
1.25 CAPSULE ORAL
COMMUNITY
End: 2024-05-16 | Stop reason: ALTCHOICE

## 2024-03-14 RX ORDER — ENOXAPARIN SODIUM 100 MG/ML
40 INJECTION SUBCUTANEOUS EVERY 12 HOURS SCHEDULED
Status: DISCONTINUED | OUTPATIENT
Start: 2024-03-14 | End: 2024-03-15 | Stop reason: HOSPADM

## 2024-03-14 RX ORDER — FLUTICASONE FUROATE AND VILANTEROL 200; 25 UG/1; UG/1
1 POWDER RESPIRATORY (INHALATION)
Status: DISCONTINUED | OUTPATIENT
Start: 2024-03-15 | End: 2024-03-15 | Stop reason: HOSPADM

## 2024-03-14 RX ORDER — ACETAMINOPHEN 650 MG/1
650 SUPPOSITORY RECTAL EVERY 4 HOURS PRN
Status: DISCONTINUED | OUTPATIENT
Start: 2024-03-14 | End: 2024-03-15

## 2024-03-14 RX ORDER — DOXYLAMINE SUCCINATE 25 MG
1 TABLET ORAL AS NEEDED
COMMUNITY

## 2024-03-14 RX ORDER — ACETAMINOPHEN 500 MG
5 TABLET ORAL NIGHTLY
Status: DISCONTINUED | OUTPATIENT
Start: 2024-03-14 | End: 2024-03-15 | Stop reason: HOSPADM

## 2024-03-14 RX ORDER — NAPROXEN SODIUM 220 MG/1
324 TABLET, FILM COATED ORAL ONCE
Status: COMPLETED | OUTPATIENT
Start: 2024-03-14 | End: 2024-03-14

## 2024-03-14 RX ORDER — ONDANSETRON HYDROCHLORIDE 2 MG/ML
4 INJECTION, SOLUTION INTRAVENOUS EVERY 8 HOURS PRN
Status: DISCONTINUED | OUTPATIENT
Start: 2024-03-14 | End: 2024-03-15 | Stop reason: HOSPADM

## 2024-03-14 RX ORDER — ACETAMINOPHEN 160 MG/5ML
650 SOLUTION ORAL EVERY 4 HOURS PRN
Status: DISCONTINUED | OUTPATIENT
Start: 2024-03-14 | End: 2024-03-15

## 2024-03-14 RX ORDER — OXYCODONE AND ACETAMINOPHEN 5; 325 MG/1; MG/1
1 TABLET ORAL 2 TIMES DAILY
COMMUNITY
Start: 2024-03-04 | End: 2024-04-29 | Stop reason: WASHOUT

## 2024-03-14 RX ORDER — HYOSCYAMINE SULFATE 0.12 MG/1
TABLET SUBLINGUAL
Qty: 100 TABLET | Refills: 3 | Status: SHIPPED | OUTPATIENT
Start: 2024-03-14

## 2024-03-14 RX ORDER — BISACODYL 5 MG
5 TABLET, DELAYED RELEASE (ENTERIC COATED) ORAL DAILY PRN
Status: DISCONTINUED | OUTPATIENT
Start: 2024-03-14 | End: 2024-03-15 | Stop reason: HOSPADM

## 2024-03-14 RX ORDER — METOCLOPRAMIDE 10 MG/1
5 TABLET ORAL DAILY
Status: DISCONTINUED | OUTPATIENT
Start: 2024-03-14 | End: 2024-03-15 | Stop reason: HOSPADM

## 2024-03-14 RX ORDER — OXYCODONE AND ACETAMINOPHEN 5; 325 MG/1; MG/1
1 TABLET ORAL 2 TIMES DAILY
Status: DISCONTINUED | OUTPATIENT
Start: 2024-03-14 | End: 2024-03-15 | Stop reason: HOSPADM

## 2024-03-14 RX ORDER — NAPROXEN SODIUM 220 MG/1
81 TABLET, FILM COATED ORAL DAILY
Status: DISCONTINUED | OUTPATIENT
Start: 2024-03-15 | End: 2024-03-15 | Stop reason: HOSPADM

## 2024-03-14 RX ORDER — ONDANSETRON 4 MG/1
4 TABLET, FILM COATED ORAL EVERY 8 HOURS PRN
Status: DISCONTINUED | OUTPATIENT
Start: 2024-03-14 | End: 2024-03-15 | Stop reason: HOSPADM

## 2024-03-14 RX ORDER — ACETAMINOPHEN 325 MG/1
650 TABLET ORAL EVERY 4 HOURS PRN
Status: DISCONTINUED | OUTPATIENT
Start: 2024-03-14 | End: 2024-03-15 | Stop reason: HOSPADM

## 2024-03-14 RX ORDER — FAMOTIDINE 20 MG/1
40 TABLET, FILM COATED ORAL NIGHTLY
Status: DISCONTINUED | OUTPATIENT
Start: 2024-03-14 | End: 2024-03-15 | Stop reason: HOSPADM

## 2024-03-14 RX ORDER — METOPROLOL SUCCINATE 50 MG/1
50 TABLET, EXTENDED RELEASE ORAL
Status: DISCONTINUED | OUTPATIENT
Start: 2024-03-15 | End: 2024-03-15 | Stop reason: HOSPADM

## 2024-03-14 RX ADMIN — OXYCODONE HYDROCHLORIDE AND ACETAMINOPHEN 1 TABLET: 5; 325 TABLET ORAL at 20:52

## 2024-03-14 RX ADMIN — METOCLOPRAMIDE 5 MG: 10 TABLET ORAL at 20:18

## 2024-03-14 RX ADMIN — ROPINIROLE HYDROCHLORIDE 0.5 MG: 0.25 TABLET, FILM COATED ORAL at 20:14

## 2024-03-14 RX ADMIN — ASPIRIN 81 MG CHEWABLE TABLET 324 MG: 81 TABLET CHEWABLE at 16:55

## 2024-03-14 RX ADMIN — AMITRIPTYLINE HYDROCHLORIDE 10 MG: 10 TABLET, FILM COATED ORAL at 20:15

## 2024-03-14 RX ADMIN — OXYBUTYNIN CHLORIDE 5 MG: 5 TABLET ORAL at 20:17

## 2024-03-14 RX ADMIN — BISACODYL 5 MG: 5 TABLET, COATED ORAL at 23:00

## 2024-03-14 RX ADMIN — FAMOTIDINE 40 MG: 20 TABLET ORAL at 20:17

## 2024-03-14 RX ADMIN — ENOXAPARIN SODIUM 40 MG: 100 INJECTION SUBCUTANEOUS at 20:18

## 2024-03-14 SDOH — SOCIAL STABILITY: SOCIAL INSECURITY: ARE THERE ANY APPARENT SIGNS OF INJURIES/BEHAVIORS THAT COULD BE RELATED TO ABUSE/NEGLECT?: NO

## 2024-03-14 SDOH — SOCIAL STABILITY: SOCIAL INSECURITY: HAS ANYONE EVER THREATENED TO HURT YOUR FAMILY OR YOUR PETS?: NO

## 2024-03-14 SDOH — SOCIAL STABILITY: SOCIAL INSECURITY: DO YOU FEEL UNSAFE GOING BACK TO THE PLACE WHERE YOU ARE LIVING?: NO

## 2024-03-14 SDOH — SOCIAL STABILITY: SOCIAL INSECURITY: WERE YOU ABLE TO COMPLETE ALL THE BEHAVIORAL HEALTH SCREENINGS?: YES

## 2024-03-14 SDOH — SOCIAL STABILITY: SOCIAL INSECURITY: DOES ANYONE TRY TO KEEP YOU FROM HAVING/CONTACTING OTHER FRIENDS OR DOING THINGS OUTSIDE YOUR HOME?: NO

## 2024-03-14 SDOH — SOCIAL STABILITY: SOCIAL INSECURITY: DO YOU FEEL ANYONE HAS EXPLOITED OR TAKEN ADVANTAGE OF YOU FINANCIALLY OR OF YOUR PERSONAL PROPERTY?: NO

## 2024-03-14 SDOH — SOCIAL STABILITY: SOCIAL INSECURITY: ABUSE: ADULT

## 2024-03-14 SDOH — SOCIAL STABILITY: SOCIAL INSECURITY: HAVE YOU HAD THOUGHTS OF HARMING ANYONE ELSE?: NO

## 2024-03-14 SDOH — SOCIAL STABILITY: SOCIAL INSECURITY: ARE YOU OR HAVE YOU BEEN THREATENED OR ABUSED PHYSICALLY, EMOTIONALLY, OR SEXUALLY BY ANYONE?: NO

## 2024-03-14 ASSESSMENT — ACTIVITIES OF DAILY LIVING (ADL)
ADEQUATE_TO_COMPLETE_ADL: YES
PATIENT'S MEMORY ADEQUATE TO SAFELY COMPLETE DAILY ACTIVITIES?: YES
FEEDING YOURSELF: INDEPENDENT
WALKS IN HOME: INDEPENDENT
GROOMING: INDEPENDENT
HEARING - LEFT EAR: FUNCTIONAL
JUDGMENT_ADEQUATE_SAFELY_COMPLETE_DAILY_ACTIVITIES: YES
HEARING - RIGHT EAR: FUNCTIONAL
BATHING: INDEPENDENT
TOILETING: INDEPENDENT
LACK_OF_TRANSPORTATION: NO
DRESSING YOURSELF: INDEPENDENT

## 2024-03-14 ASSESSMENT — COGNITIVE AND FUNCTIONAL STATUS - GENERAL
MOBILITY SCORE: 18
TURNING FROM BACK TO SIDE WHILE IN FLAT BAD: A LITTLE
MOVING FROM LYING ON BACK TO SITTING ON SIDE OF FLAT BED WITH BEDRAILS: A LITTLE
PATIENT BASELINE BEDBOUND: NO
STANDING UP FROM CHAIR USING ARMS: A LITTLE
MOVING TO AND FROM BED TO CHAIR: A LITTLE
DAILY ACTIVITIY SCORE: 24
CLIMB 3 TO 5 STEPS WITH RAILING: A LITTLE
WALKING IN HOSPITAL ROOM: A LITTLE

## 2024-03-14 ASSESSMENT — PAIN - FUNCTIONAL ASSESSMENT
PAIN_FUNCTIONAL_ASSESSMENT: 0-10
PAIN_FUNCTIONAL_ASSESSMENT: 0-10

## 2024-03-14 ASSESSMENT — PATIENT HEALTH QUESTIONNAIRE - PHQ9
SUM OF ALL RESPONSES TO PHQ9 QUESTIONS 1 & 2: 0
2. FEELING DOWN, DEPRESSED OR HOPELESS: NOT AT ALL
1. LITTLE INTEREST OR PLEASURE IN DOING THINGS: NOT AT ALL

## 2024-03-14 ASSESSMENT — PAIN DESCRIPTION - PAIN TYPE: TYPE: ACUTE PAIN

## 2024-03-14 ASSESSMENT — LIFESTYLE VARIABLES
AUDIT-C TOTAL SCORE: 0
HOW OFTEN DO YOU HAVE A DRINK CONTAINING ALCOHOL: NEVER
EVER HAD A DRINK FIRST THING IN THE MORNING TO STEADY YOUR NERVES TO GET RID OF A HANGOVER: NO
HOW OFTEN DO YOU HAVE 6 OR MORE DRINKS ON ONE OCCASION: NEVER
SKIP TO QUESTIONS 9-10: 1
EVER FELT BAD OR GUILTY ABOUT YOUR DRINKING: NO
HAVE YOU EVER FELT YOU SHOULD CUT DOWN ON YOUR DRINKING: NO
AUDIT-C TOTAL SCORE: 0
HAVE PEOPLE ANNOYED YOU BY CRITICIZING YOUR DRINKING: NO
HOW MANY STANDARD DRINKS CONTAINING ALCOHOL DO YOU HAVE ON A TYPICAL DAY: PATIENT DOES NOT DRINK

## 2024-03-14 ASSESSMENT — COLUMBIA-SUICIDE SEVERITY RATING SCALE - C-SSRS
1. IN THE PAST MONTH, HAVE YOU WISHED YOU WERE DEAD OR WISHED YOU COULD GO TO SLEEP AND NOT WAKE UP?: NO
2. HAVE YOU ACTUALLY HAD ANY THOUGHTS OF KILLING YOURSELF?: NO
6. HAVE YOU EVER DONE ANYTHING, STARTED TO DO ANYTHING, OR PREPARED TO DO ANYTHING TO END YOUR LIFE?: NO

## 2024-03-14 ASSESSMENT — PAIN DESCRIPTION - DESCRIPTORS: DESCRIPTORS: ACHING

## 2024-03-14 ASSESSMENT — PAIN SCALES - GENERAL
PAINLEVEL_OUTOF10: 8
PAINLEVEL_OUTOF10: 10 - WORST POSSIBLE PAIN
PAINLEVEL_OUTOF10: 8

## 2024-03-14 NOTE — ED TRIAGE NOTES
This patient was seen and examined in triage    HPI:  Patient is a healthy nontoxic-appearing 58-year-old female with past medical history of antiphospholipid syndrome, anxiety, hypoxia, bipolar affective disorder, cauda equina syndrome, hypertension, esophageal reflux, gastroparesis, gout, IBS, presents emergency today complaining of chest discomfort, fatigue and shortness of breath.  Patient states symptoms been ongoing over the past 4 days.  Patient states chest pressure feels like a pushing sensation with no alleviating or worsening factors and resolves on its own spontaneously.  Patient denies any injuries trauma or falls.  Patient states shortness of breath is worse with activity and improves with rest.  Patient denies any abdominal pain or nausea vomiting or diarrhea or constipation, fever, shaking, or chills.    Focused PE:  Gen: Well-appearing, not in acute distress  Cardiovascular: Regular rate, normal rhythm, no murmur, no gallop  Respiratory: No adventitious lung sounds auscultated.  Abdomen: No reproducible abdominal tenderness upon palpation,  physical exam may be limited by patient positioning sitting up in a chair  Neuro:  Alert and Oriented, speech clear and coherent    Plan:  Lab work, EKG, chest x-ray ordered from triage.    For the remainder the patient's work-up and ED course, please see the main ED provider note.  We discussed need for diagnostic testing including lab studies and imaging.  We also discussed that they may be asked to wait in the waiting room while these test are pending.  They understand that if they choose to leave without having the testing completed or resulted that we cannot rule out acute life-threatening illnesses and the risks involved to lead to worsening condition, permanent disability or even death.

## 2024-03-14 NOTE — ED TRIAGE NOTES
Patient arrives from home with complaints of chest pain she describes as sharp pressure that has been progressively getting worse the past 4 days

## 2024-03-14 NOTE — H&P
HPI  Patient is a 58-year-old female with a past medical history of COPD, asthma, lupus, HFpEF, DM type II, fibromyalgia, hyperlipidemia who presents to [facility] emergency department due to chest pains.  Patient states this has been ongoing for the past week.  States that she endorses a chest pressure and tightness in the center of her chest.  States it is there relatively constantly.  However, with movement it does get worse.  Does return back to baseline when she is resting.  Is unable to find any truly alleviating factors.  Was scheduled for a coronary CTA next week by her outpatient cardiologist.  Denies any headaches, dizziness, lightheadedness, shortness of breath, nausea, vomiting, abdominal pains.    In the emergency department, patient was hemodynamically stable.  Patient was hypertensive, but was moving her arm and stating that the blood pressure cuff for on measurements.  Suspect readings are artificially high.  Laboratory evaluation was obtained and unremarkable.  Troponins were 5 twice.  Chest x-ray was obtained and negative for any acute cardiopulmonary abnormalities.  EKG was obtained demonstrating normal sinus rhythm without evidence of ischemic changes on my interpretation.  Patient was given aspirin admitted to the hospital for further evaluation.    Past medical history: As above  Surgical history: Denies any previous surgeries  Family history: MI, sarcoidosis  Social history: Less than half pack per day smoker, working on quitting, denies alcohol or illicit drug use    Review of Systems  10 point review of system was performed and negative except as stated above.    OBJECTIVE  Physical Exam:  General:  Pleasant and cooperative. No apparent distress.  HEENT:  Normocephalic, atraumatic, mucus membranes moist.   Neck:  Trachea midline.  No JVD.    Chest:  Clear to auscultation bilaterally. No wheezes, rales, or rhonchi.  CV:  Regular rate and rhythm.  Positive S1/S2.    Abdomen: Bowel sounds present in all four quadrants, abdomen is soft, non-tender, non-distended.  Extremities:  No lower extremity edema or cyanosis.   Neurological:  AAOx3. No focal deficits.  Skin:  Warm and dry.    ASSESSMENT & PLAN  Atypical chest pain  Tobacco abuse    Chronic conditions:  DM type II  COPD, not in acute exacerbation  Asthma, not in acute exacerbation  Lupus  HFpEF, not in acute exacerbation  Fibromyalgia  Hyperlipidemia    -Troponins are negative x 2.  EKG without evidence of ischemic changes.  Given aspirin 324 in the emergency department.  Will continue aspirin 81 mg daily.  Does take rosuvastatin at home, will continue with this.  Cardiology placed on consult.  Her outpatient cardiologist is Dr. Browning.  Was scheduled for coronary CTA on 3/19.  However, will obtain echocardiogram at this time.  Will defer inpatient coronary CTA to cardiology service.  Will make n.p.o. after midnight in case stress test is warranted  -Encouraged tobacco cessation  -Home diabetes regimen is empagliflozin 10 mg daily.  Most recent A1c is 6.9 in May 2023.  Will obtain lipid panel as well as hemoglobin A1c given cardiac risk factors    DVT Prophylaxis: Enoxaparin 40 mg twice daily  DIET: Carbohydrate controlled, n.p.o. after midnight  IVF: None  Activity: As tolerated  Consults: Cardiology  Disposition: Observation

## 2024-03-14 NOTE — PROGRESS NOTES
Pharmacy Medication History Review    Josemanuel Reinoso is a 58 y.o. female admitted for No Principal Problem: There is no principal problem currently on the Problem List. Please update the Problem List and refresh.. Pharmacy reviewed the patient's [id number] medications and allergies for accuracy.    The list below reflectives the updated PTA list. Please review each medication in order reconciliation for additional clarification and justification.  Prior to Admission medications    Medication Sig Start Date End Date Taking? Authorizing Provider   oxyCODONE-acetaminophen (Percocet) 5-325 mg tablet Take 1 tablet by mouth 2 times a day. 3/4/24  Yes Historical Provider, MD   albuterol 2.5 mg /3 mL (0.083 %) nebulizer solution Take 3 mL (2.5 mg) by nebulization every 6 hours if needed. 12/3/13   Historical Provider, MD   albuterol 90 mcg/actuation inhaler inhale 1 to 2 puffs by mouth every 4 hours Inhalation    Historical Provider, MD   amitriptyline (Elavil) 10 mg tablet Take 1 tablet (10 mg) by mouth once daily at bedtime. 5/29/23   Historical Provider, MD   azelastine (Astelin) 137 mcg (0.1 %) nasal spray 1 spray by Does not apply route 2 times a day as needed. 8/23/23 3/14/24  Historical Provider, MD   bisacodyl (Dulcolax) 5 mg EC tablet take 1 tablet by mouth once daily if needed for constipation 4/5/23   Historical Provider, MD   cetirizine (ZyrTEC) 10 mg tablet Take 1 tablet (10 mg) by mouth once daily. 3/20/15   Historical Provider, MD   chlorthalidone (Hygroton) 25 mg tablet Take 1 tablet (25 mg) by mouth once daily. 6/13/23   Historical Provider, MD   cyanocobalamin (Vitamin B-12) 1,000 mcg tablet Take 1 tablet (1,000 mcg) by mouth once daily. 10/27/21   Historical Provider, MD   docusate sodium (Colace) 100 mg capsule Take 1 capsule (100 mg) by mouth 2 times a day as needed for constipation. 2/15/24   Historical Provider, MD   empagliflozin (Jardiance) 10 mg Take 1 tablet (10 mg) by mouth once daily  with breakfast. 8/10/23   Historical Provider, MD   ergocalciferol (Vitamin D-2) 1.25 MG (76046 UT) capsule Take 1 capsule (1,250 mcg) by mouth 1 (one) time per week.    Historical Provider, MD   famotidine (Pepcid) 40 mg tablet Take 1 tablet (40 mg) by mouth once daily at bedtime. 2/7/24   Jose Francisco Suresh MD   fluticasone (Flonase) 50 mcg/actuation nasal spray 1 spray by Does not apply route 2 times a day as needed. 6/10/17   Historical Provider, MD   fluticasone-umeclidin-vilanter (Trelegy Ellipta) 200-62.5-25 mcg blister with device Inhale 1 puff once daily. 8/23/23   Historical Provider, MD   hyoscyamine 0.125 mg disintegrating tablet DISSOLVE 1 TABLET UNDER THE TONGUE EVERY 4 HOURS AS NEEDED 3/14/24   Jose Francisco Suresh MD   ipratropium-albuteroL (Duo-Neb) 0.5-2.5 mg/3 mL nebulizer solution Inhale 3 mL every 6 hours if needed. 8/23/23   Historical Provider, MD   lidocaine (Lidoderm) 5 % patch Place 1 patch on the skin once daily as needed. 10/13/15   Historical Provider, MD   melatonin 5 mg tablet Take 1 tablet (5 mg) by mouth once daily as needed. 7/6/23   Historical Provider, MD   metoclopramide (Reglan) 5 mg tablet Take 1 tablet (5 mg) by mouth once daily. 9/14/22   Historical Provider, MD   metoprolol succinate XL (Toprol-XL) 50 mg 24 hr tablet Take 1 tablet (50 mg) by mouth once daily. 6/5/23 3/14/24  Historical Provider, MD   miconazole (Micotin) 2 % cream Apply 1 Application topically if needed. To abdomen    Historical Provider, MD   montelukast (Singulair) 10 mg tablet Take 1 tablet (10 mg) by mouth once daily as needed. 2/10/16   Historical Provider, MD   ondansetron (Zofran) 4 mg tablet Take 1 tablet (4 mg) by mouth every 6 hours if needed.    Historical Provider, MD   oxybutynin XL (Ditropan-XL) 10 mg 24 hr tablet Take 1 tablet (10 mg) by mouth once daily.    Historical Provider, MD   pantoprazole (ProtoNix) 40 mg EC tablet Take 1 tablet (40 mg) by mouth once daily. Do not crush, chew, or split.  2/7/24 2/6/25  Jose Francisco Suresh MD   Pulmicort Flexhaler 90 mcg/actuation inhaler Inhale 1-2 puffs 2 times a day. 2/19/24   Historical Provider, MD   rOPINIRole (Requip) 0.5 mg tablet Take 1 tablet (0.5 mg) by mouth once daily at bedtime. 8/10/23   Historical Provider, MD   rosuvastatin (Crestor) 20 mg tablet Take 1 tablet (20 mg) by mouth once daily. 10/2/21   Historical Provider, MD        The list below reflectives the updated allergy list. Please review each documented allergy for additional clarification and justification.  Allergies  Reviewed by Delvis Kang RN on 3/14/2024        Severity Reactions Comments    Azithromycin Medium GI Upset c/o severe stomach burning    Doxycycline Medium Diarrhea, Nausea/vomiting     Erythromycin Medium GI Upset, Nausea/vomiting     Fluticasone Propion-salmeterol Medium Other Sore throat / thrush     Ibuprofen Medium GI Upset     Lamotrigine Medium Swelling Had bilateral swelling of feet and lower legs. The swelling subsided and is now gone 2 days after she stopped Lamictal    Metformin Medium GI Upset     Penicillins Medium Hives, Rash     Pregabalin Medium Hives     Estradiol Low Itching, Rash     Nickel Low Rash             Below are additional concerns with the patient's PTA list.      Ivis Devine

## 2024-03-14 NOTE — ED PROVIDER NOTES
HPI   Chief Complaint   Patient presents with    Chest Pain     Patient arrives from home with complaints of chest pain she describes as sharp pressure that has been progressively getting worse the past 4 days       Past medical history that includes hypertension, hyperlipidemia, and diabetes that is a current smoker with a family cardiac history of an MI in her father at 65 years of age presents with exertional chest pain described as tightness with associated shortness of breath.  Intermittent for the past week.  Notes associated fatigue.  Does have a history of asthma, states that this feels different.      History provided by:  Patient   used: No                        No data recorded                   Patient History   Past Medical History:   Diagnosis Date    Personal history of other diseases of the circulatory system     History of hypertension    Personal history of other endocrine, nutritional and metabolic disease     History of diabetes mellitus     Past Surgical History:   Procedure Laterality Date    MR HEAD ANGIO WO IV CONTRAST  7/14/2019    MR HEAD ANGIO WO IV CONTRAST [facility]    MR HEAD ANGIO WO IV CONTRAST  7/14/2019    MR HEAD ANGIO WO IV CONTRAST [facility]    MR NECK ANGIO WO IV CONTRAST  7/14/2019    MR NECK ANGIO WO IV CONTRAST [facility]     No family history on file.  Social History     Tobacco Use    Smoking status: Former     Types: Cigarettes    Smokeless tobacco: Never   Substance Use Topics    Alcohol use: Never    Drug use: Never       Physical Exam   ED Triage Vitals [03/14/24 1429]   Temperature Heart Rate Respirations BP   36.6 °C (97.9 °F) 78 20 167/77      Pulse Ox Temp Source Heart Rate Source Patient Position   97 % Temporal Monitor Sitting      BP Location FiO2 (%)     Right arm --       Physical Exam  Vitals and nursing note reviewed.   HENT:      Head: Atraumatic.      Mouth/Throat:      Mouth: Mucous membranes are moist.    Eyes:      Conjunctiva/sclera: Conjunctivae normal.   Cardiovascular:      Rate and Rhythm: Normal rate and regular rhythm.   Pulmonary:      Effort: Pulmonary effort is normal.      Breath sounds: Normal breath sounds.   Abdominal:      Palpations: Abdomen is soft.      Tenderness: There is no abdominal tenderness.   Musculoskeletal:         General: No deformity.      Cervical back: Normal range of motion.   Skin:     General: Skin is warm and dry.   Neurological:      Mental Status: She is alert.      Comments: Moving all extremities         ED Course & [facility]   ED Course as of 03/14/24 1721   Thu Mar 14, 2024   1720 ECG 12 Lead  My ECG interpretation: Normal sinus rhythm heart 69, no ST segment elevation, QTc 439 [AB]      ED Course User Index  [AB] Ketan Contreras MD         Diagnoses as of 03/14/24 1721   Chest pain, unspecified type       Medical Decision Making  Presents with exertional chest pain with associated shortness of breath in the setting of ACS risk factors.  Moderate risk heart score of 5 without ischemic ECG changes as well as negative high-sensitivity troponin.  Nonetheless, given her risk factors, I do think that she would benefit from escalation of care to hospitalization for further management.    Amount and/or Complexity of Data Reviewed  Labs: ordered.  Radiology: ordered.  ECG/medicine tests: ordered and independent interpretation performed. Decision-making details documented in ED Course.  Discussion of management or test interpretation with external provider(s): The admitting physician    Risk  Decision regarding hospitalization.        Procedure  Procedures     Ketan Contreras MD  03/14/24 1721

## 2024-03-15 ENCOUNTER — APPOINTMENT (OUTPATIENT)
Dept: CARDIOLOGY | Facility: HOSPITAL | Age: 59
End: 2024-03-15
Payer: COMMERCIAL

## 2024-03-15 ENCOUNTER — APPOINTMENT (OUTPATIENT)
Dept: RADIOLOGY | Facility: HOSPITAL | Age: 59
End: 2024-03-15
Payer: COMMERCIAL

## 2024-03-15 VITALS
HEART RATE: 71 BPM | BODY MASS INDEX: 46.01 KG/M2 | HEIGHT: 62 IN | TEMPERATURE: 97.9 F | RESPIRATION RATE: 16 BRPM | DIASTOLIC BLOOD PRESSURE: 97 MMHG | SYSTOLIC BLOOD PRESSURE: 182 MMHG | OXYGEN SATURATION: 93 % | WEIGHT: 250 LBS

## 2024-03-15 PROBLEM — R07.9 CHEST PAIN: Status: RESOLVED | Noted: 2024-03-14 | Resolved: 2024-03-15

## 2024-03-15 LAB
ANION GAP SERPL CALC-SCNC: 13 MMOL/L (ref 10–20)
AORTIC VALVE MEAN GRADIENT: 4 MMHG
AORTIC VALVE PEAK VELOCITY: 1.42 M/S
AV PEAK GRADIENT: 8.1 MMHG
AVA (PEAK VEL): 2.18 CM2
AVA (VTI): 2.17 CM2
BODY SURFACE AREA: 2.23 M2
BUN SERPL-MCNC: 10 MG/DL (ref 6–23)
CALCIUM SERPL-MCNC: 8.3 MG/DL (ref 8.6–10.3)
CHLORIDE SERPL-SCNC: 108 MMOL/L (ref 98–107)
CHOLEST SERPL-MCNC: 192 MG/DL (ref 0–199)
CHOLESTEROL/HDL RATIO: 5.3
CO2 SERPL-SCNC: 25 MMOL/L (ref 21–32)
CREAT SERPL-MCNC: 0.8 MG/DL (ref 0.5–1.05)
EGFRCR SERPLBLD CKD-EPI 2021: 85 ML/MIN/1.73M*2
EJECTION FRACTION APICAL 4 CHAMBER: 45.2
EJECTION FRACTION: 49 %
ERYTHROCYTE [DISTWIDTH] IN BLOOD BY AUTOMATED COUNT: 17.2 % (ref 11.5–14.5)
EST. AVERAGE GLUCOSE BLD GHB EST-MCNC: 148 MG/DL
GLUCOSE SERPL-MCNC: 92 MG/DL (ref 74–99)
HBA1C MFR BLD: 6.8 %
HCT VFR BLD AUTO: 36 % (ref 36–46)
HDLC SERPL-MCNC: 36.3 MG/DL
HGB BLD-MCNC: 10.9 G/DL (ref 12–16)
LDLC SERPL CALC-MCNC: 130 MG/DL
LEFT VENTRICLE INTERNAL DIMENSION DIASTOLE: 5.47 CM (ref 3.5–6)
LEFT VENTRICULAR OUTFLOW TRACT DIAMETER: 2 CM
MCH RBC QN AUTO: 25.5 PG (ref 26–34)
MCHC RBC AUTO-ENTMCNC: 30.3 G/DL (ref 32–36)
MCV RBC AUTO: 84 FL (ref 80–100)
MITRAL VALVE E/A RATIO: 2.25
NON HDL CHOLESTEROL: 156 MG/DL (ref 0–149)
NRBC BLD-RTO: 0 /100 WBCS (ref 0–0)
PLATELET # BLD AUTO: 312 X10*3/UL (ref 150–450)
POTASSIUM SERPL-SCNC: 3.9 MMOL/L (ref 3.5–5.3)
RBC # BLD AUTO: 4.28 X10*6/UL (ref 4–5.2)
RIGHT VENTRICLE FREE WALL PEAK S': 11.3 CM/S
RIGHT VENTRICLE PEAK SYSTOLIC PRESSURE: 23.8 MMHG
SODIUM SERPL-SCNC: 142 MMOL/L (ref 136–145)
TRICUSPID ANNULAR PLANE SYSTOLIC EXCURSION: 2.1 CM
TRIGL SERPL-MCNC: 131 MG/DL (ref 0–149)
VLDL: 26 MG/DL (ref 0–40)
WBC # BLD AUTO: 9.8 X10*3/UL (ref 4.4–11.3)

## 2024-03-15 PROCEDURE — 36415 COLL VENOUS BLD VENIPUNCTURE: CPT | Performed by: STUDENT IN AN ORGANIZED HEALTH CARE EDUCATION/TRAINING PROGRAM

## 2024-03-15 PROCEDURE — 93356 MYOCRD STRAIN IMG SPCKL TRCK: CPT | Performed by: STUDENT IN AN ORGANIZED HEALTH CARE EDUCATION/TRAINING PROGRAM

## 2024-03-15 PROCEDURE — 2500000001 HC RX 250 WO HCPCS SELF ADMINISTERED DRUGS (ALT 637 FOR MEDICARE OP): Performed by: STUDENT IN AN ORGANIZED HEALTH CARE EDUCATION/TRAINING PROGRAM

## 2024-03-15 PROCEDURE — 99239 HOSP IP/OBS DSCHRG MGMT >30: CPT

## 2024-03-15 PROCEDURE — 80061 LIPID PANEL: CPT | Performed by: STUDENT IN AN ORGANIZED HEALTH CARE EDUCATION/TRAINING PROGRAM

## 2024-03-15 PROCEDURE — 75574 CT ANGIO HRT W/3D IMAGE: CPT | Performed by: STUDENT IN AN ORGANIZED HEALTH CARE EDUCATION/TRAINING PROGRAM

## 2024-03-15 PROCEDURE — 96374 THER/PROPH/DIAG INJ IV PUSH: CPT | Mod: 59 | Performed by: STUDENT IN AN ORGANIZED HEALTH CARE EDUCATION/TRAINING PROGRAM

## 2024-03-15 PROCEDURE — 2550000001 HC RX 255 CONTRASTS: Performed by: STUDENT IN AN ORGANIZED HEALTH CARE EDUCATION/TRAINING PROGRAM

## 2024-03-15 PROCEDURE — 2500000001 HC RX 250 WO HCPCS SELF ADMINISTERED DRUGS (ALT 637 FOR MEDICARE OP): Performed by: INTERNAL MEDICINE

## 2024-03-15 PROCEDURE — G0378 HOSPITAL OBSERVATION PER HR: HCPCS

## 2024-03-15 PROCEDURE — 93306 TTE W/DOPPLER COMPLETE: CPT | Performed by: STUDENT IN AN ORGANIZED HEALTH CARE EDUCATION/TRAINING PROGRAM

## 2024-03-15 PROCEDURE — 99223 1ST HOSP IP/OBS HIGH 75: CPT | Performed by: INTERNAL MEDICINE

## 2024-03-15 PROCEDURE — 2500000004 HC RX 250 GENERAL PHARMACY W/ HCPCS (ALT 636 FOR OP/ED): Performed by: STUDENT IN AN ORGANIZED HEALTH CARE EDUCATION/TRAINING PROGRAM

## 2024-03-15 PROCEDURE — 82374 ASSAY BLOOD CARBON DIOXIDE: CPT | Performed by: STUDENT IN AN ORGANIZED HEALTH CARE EDUCATION/TRAINING PROGRAM

## 2024-03-15 PROCEDURE — 85027 COMPLETE CBC AUTOMATED: CPT | Performed by: STUDENT IN AN ORGANIZED HEALTH CARE EDUCATION/TRAINING PROGRAM

## 2024-03-15 PROCEDURE — 2500000002 HC RX 250 W HCPCS SELF ADMINISTERED DRUGS (ALT 637 FOR MEDICARE OP, ALT 636 FOR OP/ED): Performed by: STUDENT IN AN ORGANIZED HEALTH CARE EDUCATION/TRAINING PROGRAM

## 2024-03-15 PROCEDURE — 75574 CT ANGIO HRT W/3D IMAGE: CPT

## 2024-03-15 PROCEDURE — 93356 MYOCRD STRAIN IMG SPCKL TRCK: CPT

## 2024-03-15 PROCEDURE — 2500000005 HC RX 250 GENERAL PHARMACY W/O HCPCS: Performed by: STUDENT IN AN ORGANIZED HEALTH CARE EDUCATION/TRAINING PROGRAM

## 2024-03-15 RX ORDER — NITROGLYCERIN 0.4 MG/1
0.8 TABLET SUBLINGUAL ONCE
Status: COMPLETED | OUTPATIENT
Start: 2024-03-15 | End: 2024-03-15

## 2024-03-15 RX ORDER — METOPROLOL TARTRATE 50 MG/1
50 TABLET ORAL ONCE
Status: COMPLETED | OUTPATIENT
Start: 2024-03-15 | End: 2024-03-15

## 2024-03-15 RX ORDER — METOPROLOL TARTRATE 1 MG/ML
5 INJECTION, SOLUTION INTRAVENOUS ONCE
Status: COMPLETED | OUTPATIENT
Start: 2024-03-15 | End: 2024-03-15

## 2024-03-15 RX ADMIN — METOPROLOL SUCCINATE 50 MG: 50 TABLET, EXTENDED RELEASE ORAL at 09:15

## 2024-03-15 RX ADMIN — METOCLOPRAMIDE 5 MG: 10 TABLET ORAL at 09:17

## 2024-03-15 RX ADMIN — BISACODYL 5 MG: 5 TABLET, COATED ORAL at 09:23

## 2024-03-15 RX ADMIN — IOHEXOL 90 ML: 350 INJECTION, SOLUTION INTRAVENOUS at 11:24

## 2024-03-15 RX ADMIN — EMPAGLIFLOZIN 10 MG: 10 TABLET, FILM COATED ORAL at 09:17

## 2024-03-15 RX ADMIN — METOPROLOL TARTRATE 50 MG: 50 TABLET, FILM COATED ORAL at 09:15

## 2024-03-15 RX ADMIN — OXYBUTYNIN CHLORIDE 5 MG: 5 TABLET ORAL at 09:17

## 2024-03-15 RX ADMIN — CHLORTHALIDONE 25 MG: 25 TABLET ORAL at 09:17

## 2024-03-15 RX ADMIN — NITROGLYCERIN 0.8 MG: 0.4 TABLET SUBLINGUAL at 10:23

## 2024-03-15 RX ADMIN — METOPROLOL TARTRATE 5 MG: 5 INJECTION INTRAVENOUS at 11:05

## 2024-03-15 RX ADMIN — OXYCODONE HYDROCHLORIDE AND ACETAMINOPHEN 1 TABLET: 5; 325 TABLET ORAL at 09:17

## 2024-03-15 RX ADMIN — ROSUVASTATIN CALCIUM 20 MG: 10 TABLET, FILM COATED ORAL at 09:17

## 2024-03-15 RX ADMIN — ENOXAPARIN SODIUM 40 MG: 100 INJECTION SUBCUTANEOUS at 09:20

## 2024-03-15 RX ADMIN — NITROGLYCERIN 0.8 MG: 0.4 TABLET SUBLINGUAL at 11:05

## 2024-03-15 RX ADMIN — ASPIRIN 81 MG CHEWABLE TABLET 81 MG: 81 TABLET CHEWABLE at 09:17

## 2024-03-15 ASSESSMENT — COGNITIVE AND FUNCTIONAL STATUS - GENERAL
MOBILITY SCORE: 18
MOVING TO AND FROM BED TO CHAIR: A LITTLE
WALKING IN HOSPITAL ROOM: A LITTLE
STANDING UP FROM CHAIR USING ARMS: A LITTLE
MOVING FROM LYING ON BACK TO SITTING ON SIDE OF FLAT BED WITH BEDRAILS: A LITTLE
CLIMB 3 TO 5 STEPS WITH RAILING: A LITTLE
TURNING FROM BACK TO SIDE WHILE IN FLAT BAD: A LITTLE
DAILY ACTIVITIY SCORE: 24

## 2024-03-15 ASSESSMENT — PAIN SCALES - GENERAL: PAINLEVEL_OUTOF10: 0 - NO PAIN

## 2024-03-15 ASSESSMENT — PAIN - FUNCTIONAL ASSESSMENT: PAIN_FUNCTIONAL_ASSESSMENT: 0-10

## 2024-03-15 NOTE — ED NOTES
Patient admitted to hospital. Patient transported to inpatient unit on ed stretcher in fair, stable condition. Upon leaving ED, patient is A&O x 4, respirations are easy, even and unlabored on room air and skin is warm and dry.        Henrietta Ny RN  03/14/24 [id number]

## 2024-03-15 NOTE — NURSING NOTE
1000: At bedside with patient to monitor and administer medications ordered for coronary CTA. Patient stable with no needs at this time. HR 56.     1015: Patient stable. Tolerating well. HR 56.    1030: Patient stable. Tolerating well. HR 58.    1045: Patient stable. Tolerating well. HR 56.     1100: Dr. Browning made aware of imaging results. Telephone order taken for medication administration for repeat imaging. Patient stable. Tolerating well.    1110: Imaging complete. Patient tolerated well. HR 58.

## 2024-03-15 NOTE — CARE PLAN
The patient's goals for the shift include rest    The clinical goals for the shift include pain control    Over the shift, the patient did  make progress toward the following goals. Barriers to progression include none. Recommendations to address these barriers include none.

## 2024-03-15 NOTE — PROGRESS NOTES
03/15/24 1122   Discharge Planning   Living Arrangements Alone   Support Systems Friends/neighbors;Family members   Type of Residence Private residence   Do you have animals or pets at home? No   Who is requesting discharge planning? Provider   Patient expects to be discharged to: home     Met with pt , pt admit for chest pain, cardiology consulted, diagnostic  testing pending, if negative plan for home with possible stress test out patient.  Home address and insurance verified.  Plan is for home at discharge.   Flaquita Gupta RN [facility]

## 2024-03-15 NOTE — DISCHARGE SUMMARY
Discharge diagnosis:  #DM type II  #COPD, not in acute exacerbation  #Asthma, not in acute exacerbation  #Lupus  #HFpEF, not in acute exacerbation  #Fibromyalgia  #Hyperlipidemia   #IDRIS  #Atypical chest pain  #Tobacco abuse    Follow up regarding  Please make sure to follow-up with a cardiologist regarding your echocardiogram and MRA    Hospital course  Josemanuel Reinoso 58-year-old female with a past medical history of COPD, asthma, lupus, HFpEF, DM type II, fibromyalgia, hyperlipidemia who presents to [facility] emergency department due to chest pains.  Patient was evaluated, her troponins were negative and an ECG did not reveal STEMI or NSTEMI.  Cardiology was consulted.  A TTE was obtained along with an MRA of her heart.  TTE revealed LVEF 50 to 55% with concentric left ventricular hypertrophy.  No changes were made to her medications.  Patient was advised to follow-up with her cardiologist.    Vitals:    03/15/24 0810   BP: (!) 182/97   Pulse: 71   Resp: 16   Temp: 36.6 °C (97.9 °F)   SpO2: 93%      Physical exam   GENERAL: Awake/ alert, cooperative.   HEAD:  Normocephalic, atraumatic.  EYES:  Round and reactive.  ENT:  No nasal discharge, mucous membranes moist and pink.  NECK:  Atraumatic, no meningismus.  CARDIOVASCULAR:  Regular rate and rhythm, no murmur.  RESPIRATORY: CTAB  ABDOMEN:  Soft, no tenderness, nondistended.  EXTREMITIES: Nonpitting edema in bilateral lower extremities  SKIN:  Warm and dry.  NEUROLOGICAL:  Nonfocal grossly.     Home-going medications     Medication List      CONTINUE taking these medications     * albuterol 90 mcg/actuation inhaler   * albuterol 2.5 mg /3 mL (0.083 %) nebulizer solution   amitriptyline 10 mg tablet; Commonly known as: Elavil   bisacodyl 5 mg EC tablet; Commonly known as: Dulcolax   cetirizine 10 mg tablet; Commonly known as: ZyrTEC   chlorthalidone 25 mg tablet; Commonly known as: [place]gro[place]   cyanocobalamin 1,000 mcg tablet; Commonly  known as: Vitamin B-12   docusate sodium 100 mg capsule; Commonly known as: Colace   empagliflozin 10 mg; Commonly known as: Jardiance   ergocalciferol 1.25 MG (09048 UT) capsule; Commonly known as: Vitamin   D-2   famotidine 40 mg tablet; Commonly known as: Pepcid; Take 1 tablet (40   mg) by mouth once daily at bedtime.   fluticasone 50 mcg/actuation nasal spray; Commonly known as: Flonase   hyoscyamine 0.125 mg disintegrating tablet; Commonly known as: Anaspaz;   DISSOLVE 1 TABLET UNDER THE TONGUE EVERY 4 HOURS AS NEEDED   ipratropium-albuteroL 0.5-2.5 mg/3 mL nebulizer solution; Commonly known   as: Duo-Neb   lidocaine 5 % patch; Commonly known as: Lidoderm   melatonin 5 mg tablet   metoclopramide 5 mg tablet; Commonly known as: Reglan   metoprolol succinate XL 50 mg 24 hr tablet; Commonly known as: Toprol-XL   miconazole 2 % cream; Commonly known as: Micotin   montelukast 10 mg tablet; Commonly known as: Singulair   ondansetron 4 mg tablet; Commonly known as: Zofran   oxybutynin XL 10 mg 24 hr tablet; Commonly known as: Ditropan-XL   oxyCODONE-acetaminophen 5-325 mg tablet; Commonly known as: Percocet   pantoprazole 40 mg EC tablet; Commonly known as: ProtoNix; Take 1 tablet   (40 mg) by mouth once daily. Do not crush, chew, or split.   Pulmicort Flexhaler 90 mcg/actuation inhaler; Generic drug: budesonide   rOPINIRole 0.5 mg tablet; Commonly known as: Requip   rosuvastatin 20 mg tablet; Commonly known as: Crestor   Trelegy Ellipta 200-62.5-25 mcg blister with device; Generic drug:   fluticasone-umeclidin-vilanter  * This list has 2 medication(s) that are the same as other medications   prescribed for you. Read the directions carefully, and ask your doctor or   other care provider to review them with you.     STOP taking these medications     azelastine 137 mcg (0.1 %) nasal spray; Commonly known as: Astelin   fexofenadine 180 mg tablet; Commonly known as: Allegra   ivabradine 7.5 mg tablet; Commonly known as:  Jean Carlos       Outpatient follow-up instructions and medication changes  Future Appointments   Date Time Provider Department Center   3/19/2024  1:00 PM PAR CT 1 PARCT [facility]   4/23/2024  2:40 PM Jose Francisco Suresh MD [id number] Academic   5/16/2024 12:40 PM Miguel Ward MD [id number] Academic   6/14/2024 10:00 AM Jose Francisco Suresh MD [id number] Academic       Bud Crocker DO, PGY-1  Internal Medicine

## 2024-03-15 NOTE — PROGRESS NOTES
Josemanuel Reinoso is a 58 y.o. female on day 0 of admission presenting with Chest pain.      Subjective   Patient seen examined at bedside this morning, she is still continue to endorse chest pain/pressure.  Discussed the plan with her.  She is agreeable.    Objective     Last Recorded Vitals  BP (!) 182/97 (BP Location: Left arm, Patient Position: Sitting)   Pulse 71   Temp 36.6 °C (97.9 °F) (Temporal)   Resp 16   Wt 113 kg (250 lb)   SpO2 93%   Intake/Output last 3 Shifts:  No intake or output data in the 24 hours ending 03/15/24 1121    Admission Weight  Weight: 113 kg (250 lb) (03/14/24 1429)    Daily Weight  03/14/24 : 113 kg (250 lb)    Image Results  XR chest 2 views  Narrative: Interpreted By:  Cristina Nicole,   STUDY:  XR CHEST 2 VIEWS;  3/14/2024 3:51 pm      INDICATION:  Signs/Symptoms:cp.      COMPARISON:  01/15/2024      ACCESSION NUMBER(S):  [id number]      ORDERING CLINICIAN:  RAFAEL DE LOS SANTOS      FINDINGS:  CARDIOMEDIASTINAL SILHOUETTE:  The heart is enlarged but unchanged.  The thoracic aorta is tortuous and unfolded.          LUNGS:  Lungs are clear.      ABDOMEN:  No remarkable upper abdominal findings.          BONES:  No acute osseous changes.      Impression: No acute cardiopulmonary process.      MACRO:  None      Signed by: Cristina Nicole 3/14/2024 4:18 PM  Dictation workstation:   [id number]      Physical Exam  Constitutional: Awake, alert, NAD  ENMT: mucous membranes moist, anicteric sclera  Head/Neck: normocephalic, atraumatic; supple, trachea midline  Respiratory/Thorax: patent airways, CTAB; no wheezes, rales, or rhonchi  Cardiovascular: RRR, no murmur  Gastrointestinal: soft, nondistended, non-tender, bowel sounds appreciated  Extremities: Trace nonpitting edema over the bilateral lower extremities  Neurological: AO x3, no focal deficits  Psychological: appropriate mood and behavior  Skin: warm and dry      Assessment/Plan   Josemanuel Reinoso 58-year-old female with a past medical  history of COPD, asthma, lupus, HFpEF, DM type II, fibromyalgia, hyperlipidemia who presents to [facility] emergency department due to chest pains.     Acute:  #Atypical chest pain  #Tobacco abuse  -Cardiology consulted, appreciate recommendations  -Planning for CTA today  -Metoprolol 50 prior to CTA  -Echocardiogram pending  -Lipid panel obtained    Chronic conditions:  #DM type II  #COPD, not in acute exacerbation  #Asthma, not in acute exacerbation  #Lupus  #HFpEF, not in acute exacerbation  #Fibromyalgia  #Hyperlipidemia   #IDRIS  -Continue amitriptyline, chlorthalidone, Jardiance, Pepcid, Reglan, metoprolol, Singulair, oxybutynin, Percocet, ropinirole, rosuvastatin, Spiriva, Breo Ellipta  -Patient to receive her CPAP machine soon, does not know her settings    DVT Prophylaxis: Enoxaparin 40 mg twice daily  DIET: Carbohydrate controlled, n.p.o. after midnight  IVF: None  Activity: As tolerated  Consults: Cardiology  Disposition: Observation    Bud Crocker DO PGY1  Internal medicine

## 2024-03-15 NOTE — CARE PLAN
Problem: Pain  Goal: My pain/discomfort is manageable  Outcome: Progressing     Problem: Safety  Goal: Patient will be injury free during hospitalization  Outcome: Progressing  Goal: I will remain free of falls  Outcome: Progressing     Problem: Daily Care  Goal: Daily care needs are met  Outcome: Progressing     Problem: Psychosocial Needs  Goal: Demonstrates ability to cope with hospitalization/illness  Outcome: Progressing  Goal: Collaborate with me, my family, and caregiver to identify my specific goals  Outcome: Progressing     Problem: Discharge Barriers  Goal: My discharge needs are met  Outcome: Progressing     Problem: Fall/Injury  Goal: Not fall by end of shift  Outcome: Progressing  Goal: Be free from injury by end of the shift  Outcome: Progressing  Goal: Verbalize understanding of personal risk factors for fall in the hospital  Outcome: Progressing  Goal: Verbalize understanding of risk factor reduction measures to prevent injury from fall in the home  Outcome: Progressing  Goal: Use assistive devices by end of the shift  Outcome: Progressing  Goal: Pace activities to prevent fatigue by end of the shift  Outcome: Progressing     Problem: Pain  Goal: Takes deep breaths with improved pain control throughout the shift  Outcome: Progressing  Goal: Turns in bed with improved pain control throughout the shift  Outcome: Progressing  Goal: Walks with improved pain control throughout the shift  Outcome: Progressing  Goal: Performs ADL's with improved pain control throughout shift  Outcome: Progressing  Goal: Participates in PT with improved pain control throughout the shift  Outcome: Progressing  Goal: Free from opioid side effects throughout the shift  Outcome: Progressing  Goal: Free from acute confusion related to pain meds throughout the shift  Outcome: Progressing     Problem: Diabetes  Goal: Vital signs within normal range for age by end of shift  Outcome: Progressing  Goal: Achieve decreasing blood  glucose levels by end of shift  Outcome: Progressing  Goal: Increase stability of blood glucose readings by end of shift  Outcome: Progressing  Goal: Decrease in ketones present in urine by end of shift  Outcome: Progressing  Goal: Maintain electrolyte levels within acceptable range throughout shift  Outcome: Progressing  Goal: Maintain glucose levels >70mg/dl to <250mg/dl throughout shift  Outcome: Progressing  Goal: No changes in neurological exam by end of shift  Outcome: Progressing  Goal: Learn about and adhere to nutrition recommendations by end of shift  Outcome: Progressing  Goal: Increase self care and/or family involovement by end of shift  Outcome: Progressing  Goal: Receive DSME education by end of shift  Outcome: Progressing     Problem: Pain - Adult  Goal: Verbalizes/displays adequate comfort level or baseline comfort level  Outcome: Progressing     Problem: Safety - Adult  Goal: Free from fall injury  Outcome: Progressing     Problem: Discharge Planning  Goal: Discharge to home or other facility with appropriate resources  Outcome: Progressing     Problem: Chronic Conditions and Co-morbidities  Goal: Patient's chronic conditions and co-morbidity symptoms are monitored and maintained or improved  Outcome: Progressing   The patient's goals for the shift include rest    The clinical goals for the shift include comfort and safety    Over the shift, the patient did not make progress toward the following goals. Barriers to progression include none. Recommendations to address these barriers include n/a.

## 2024-03-15 NOTE — CONSULTS
Consults  History Of Present Illness:    Josemanuel Reinoso is a 58 y.o. female presenting with CP.    Patient is a 58-year-old female with a history of COPD, asthma, lupus, HFpEF, diabetes, fibromyalgia, hyperlipidemia who presents with chest discomfort and shortness of breath.  Patient was seen by Dr. Browning in clinic.  She was scheduled for cardiac CTA March 19, 2024.  She developed severe back discomfort radiating to the chest.  Also admits to shortness of breath and lightheadedness.  Admits to occasional forceful heartbeat.  Denies any syncope.  Admits to chronic lower extremity edema.  Cannot lay flat.  Denies any PND.    Twelve-lead ECG is unavailable to me.  Troponins are 5 and 5.     Last Recorded Vitals:  Vitals:    03/14/24 2055 03/14/24 2211 03/15/24 0502 03/15/24 0810   BP: 176/81 (!) 190/87 139/76 (!) 182/97   BP Location: Left arm Left arm Left arm Left arm   Patient Position: Sitting Lying Lying Sitting   Pulse: 73 69 60 71   Resp: 20 20 20 16   Temp:  36.1 °C (97 °F) 36.4 °C (97.5 °F) 36.6 °C (97.9 °F)   TempSrc:  Temporal Temporal Temporal   SpO2: 97% 95% 93% 93%   Weight:       Height:           Last Labs:  CBC - 3/15/2024:  4:58 AM  9.8 10.9 312    36.0      CMP - 3/15/2024:  4:58 AM  8.3 6.9 15 --- 0.3   _ 3.9 9 83      PTT - No results in last year.  _   _ _     Troponin I, High Sensitivity   Date/Time Value Ref Range Status   03/14/2024 05:28 PM 5 0 - 13 ng/L Final   03/14/2024 03:41 PM 5 0 - 13 ng/L Final   01/15/2024 06:24 PM 7 0 - 13 ng/L Final     BNP   Date/Time Value Ref Range Status   03/14/2024 03:41  (H) 0 - 99 pg/mL Final   01/15/2024 06:24 PM 33 0 - 99 pg/mL Final     Hemoglobin A1C   Date/Time Value Ref Range Status   03/14/2024 03:41 PM 6.8 (H) see below % Final   05/26/2023 01:16 PM 6.9 (H) 4.3 - 5.6 % Final     Comment:     American Diabetes Association guidelines indicate that patients with HgbA1c in the range 5.7-6.4% are at increased risk for development of diabetes,  and intervention by lifestyle modification may be beneficial. HgbA1c greater or equal to 6.5% is considered diagnostic of diabetes.   03/18/2023 09:13 AM 6.4 (H) 4.3 - 5.6 % Final     Comment:     American Diabetes Association guidelines indicate that patients with HgbA1c in the range 5.7-6.4% are at increased risk for development of diabetes, and intervention by lifestyle modification may be beneficial. HgbA1c greater or equal to 6.5% is considered diagnostic of diabetes.     POCT Hemoglobin A1C   Date/Time Value Ref Range Status   08/10/2023 12:43 PM 6.4 4.2 - 5.6 % Final     Comment:     Location:[facility], [address] [place] [address], [id number]  Point of care (POC) Hemoglobin A1c (HGBA1C) testing is intended to assess  glucose control and provide a management tool for patients known to have  diabetes and their healthcare providers.  Target HGBA1C levels may depend on  specific clinical circumstances.  POC HGBA1C is not intended for use as a  diagnostic or screening test; laboratory-based testing should be used for  diagnostic purposes.  The following information is supplemental and may not  be applicable to specific diabetes management situations:  The POC device   provides a normal range of 4.2% to 6.5% for the HGBA1C POC test.  However, the American Diabetes Association  guidelines indicate that  patients with HGBA1C in the range of 5.7% to 6.4% are at increased risk for  development of diabetes and that intervention by lifestyle modification may  be beneficial.  A HGBA1C level greater than or equal to 6.5% is considered  diagnostic of diabetes, pending confirmatory testing.  Use of HGBA1C testing  to evaluate glucose control may not be appropriate for patients with  hemoglobin variants or other conditions (e.g. anemia) that alter red blood  cell lifespan.     LDL Calculated   Date/Time Value Ref Range Status   03/15/2024 04:58  (H) <=99 mg/dL Final      "Comment:                                 Near   Borderline      AGE      Desirable  Optimal    High     High     Very High     0-19 Y     0 - 109     ---    110-129   >/= 130     ----    20-24 Y     0 - 119     ---    120-159   >/= 160     ----      >24 Y     0 -  99   100-129  130-159   160-189     >/=190       VLDL   Date/Time Value Ref Range Status   03/15/2024 04:58 AM 26 0 - 40 mg/dL Final      Last I/O:  No intake/output data recorded.    Past Cardiology Tests (Last 3 Years):  EKG:  No results found for this or any previous visit from the past 1095 days.    Echo:  No results found for this or any previous visit from the past 1095 days.    Ejection Fractions:  No results found for: \"EF\"  Cath:  No results found for this or any previous visit from the past 1095 days.    Stress Test:  NUCLEAR STRESS TEST 07/31/2023    Cardiac Imaging:  No results found for this or any previous visit from the past 1095 days.      Past Medical History:  She has a past medical history of Personal history of other diseases of the circulatory system and Personal history of other endocrine, nutritional and metabolic disease.    Past Surgical History:  She has a past surgical history that includes MR angio head wo IV contrast (7/14/2019); MR angio neck wo IV contrast (7/14/2019); and MR angio head wo IV contrast (7/14/2019).      Social History:  She reports that she has quit smoking. Her smoking use included cigarettes. She has never used smokeless tobacco. She reports that she does not drink alcohol and does not use drugs.    Family History:  No family history on file.     Allergies:  Azithromycin, Doxycycline, Erythromycin, Fluticasone propion-salmeterol, Ibuprofen, Lamotrigine, Metformin, Penicillins, Pregabalin, Estradiol, and Nickel    Inpatient Medications:  Scheduled medications   Medication Dose Route Frequency    amitriptyline  10 mg oral Nightly    aspirin  81 mg oral Daily    chlorthalidone  25 mg oral Daily    empagliflozin "  10 mg oral Daily with breakfast    enoxaparin  40 mg subcutaneous q12h AMPARO    famotidine  40 mg oral Nightly    tiotropium  2 puff inhalation Daily    And    fluticasone furoate-vilanteroL  1 puff inhalation Daily    melatonin  5 mg oral Nightly    metoclopramide  5 mg oral Daily    metoprolol succinate XL  50 mg oral Daily    montelukast  10 mg oral Nightly    oxybutynin  5 mg oral BID    oxyCODONE-acetaminophen  1 tablet oral BID    perflutren lipid microspheres  3 mL of dilution intravenous Once in imaging    rOPINIRole  0.5 mg oral Nightly    rosuvastatin  20 mg oral Daily     PRN medications   Medication    acetaminophen    bisacodyl    ondansetron    Or    ondansetron     Continuous Medications   Medication Dose Last Rate     Outpatient Medications:  Current Outpatient Medications   Medication Instructions    albuterol 90 mcg/actuation inhaler inhale 1 to 2 puffs by mouth every 4 hours Inhalation    albuterol 2.5 mg, nebulization, Every 6 hours PRN    amitriptyline (Elavil) 10 mg tablet 1 tablet, oral, Nightly    azelastine (Astelin) 137 mcg (0.1 %) nasal spray 1 spray, Does not apply, 2 times daily PRN    bisacodyl (Dulcolax) 5 mg EC tablet take 1 tablet by mouth once daily if needed for constipation    cetirizine (ZYRTEC) 10 mg, oral, Daily    chlorthalidone (HYGROTON) 25 mg, oral, Daily RT    cyanocobalamin (Vitamin B-12) 1,000 mcg tablet 1 tablet, oral, Daily RT    docusate sodium (COLACE) 100 mg, oral, 2 times daily PRN    empagliflozin (Jardiance) 10 mg 1 tablet, oral, Daily with breakfast    ergocalciferol (VITAMIN D-2) 1.25 mg, oral, Weekly    famotidine (PEPCID) 40 mg, oral, Nightly    fexofenadine (ALLEGRA) 180 mg, oral, Daily RT    fluticasone (Flonase) 50 mcg/actuation nasal spray 1 spray, Does not apply, 2 times daily PRN    fluticasone-umeclidin-vilanter (Trelegy Ellipta) 200-62.5-25 mcg blister with device 1 puff, inhalation, Daily RT    hyoscyamine 0.125 mg disintegrating tablet DISSOLVE 1  TABLET UNDER THE TONGUE EVERY 4 HOURS AS NEEDED    ipratropium-albuteroL (Duo-Neb) 0.5-2.5 mg/3 mL nebulizer solution 3 mL, inhalation, Every 6 hours PRN    ivabradine (CORLANOR) 7.5 mg, oral, Once, Take 1 hour prior to coronary cta    lidocaine (Lidoderm) 5 % patch 1 patch, transdermal, Daily PRN    melatonin 5 mg, oral, Daily PRN    metoclopramide (Reglan) 5 mg tablet 1 tablet, oral, Daily    metoprolol succinate XL (TOPROL-XL) 50 mg, oral, Daily RT    miconazole (Micotin) 2 % cream 1 Application, Topical, As needed, To abdomen    montelukast (SINGULAIR) 10 mg, oral, Daily PRN    ondansetron (Zofran) 4 mg tablet 1 tablet, oral, Every 6 hours PRN    oxybutynin XL (DITROPAN-XL) 10 mg, oral, Daily    oxyCODONE-acetaminophen (Percocet) 5-325 mg tablet 1 tablet, oral, 2 times daily    pantoprazole (PROTONIX) 40 mg, oral, Daily, Do not crush, chew, or split.    Pulmicort Flexhaler 90 mcg/actuation inhaler 1-2 puffs, inhalation, 2 times daily RT    rOPINIRole (REQUIP) 0.5 mg, oral, Nightly    rosuvastatin (Crestor) 20 mg tablet Take 1 tablet (20 mg) by mouth once daily.       Physical Exam:  Physical Exam  Vitals reviewed.   Constitutional:       Appearance: Normal appearance.   HENT:      Head: Normocephalic and atraumatic.      Mouth/Throat:      Mouth: Mucous membranes are moist.      Pharynx: Oropharynx is clear.   Eyes:      Extraocular Movements: Extraocular movements intact.      Conjunctiva/sclera: Conjunctivae normal.   Cardiovascular:      Rate and Rhythm: Normal rate and regular rhythm.      Pulses: Normal pulses.      Heart sounds: Normal heart sounds.   Pulmonary:      Effort: Pulmonary effort is normal.      Breath sounds: Normal breath sounds.   Abdominal:      General: Bowel sounds are normal.      Palpations: Abdomen is soft.   Musculoskeletal:      Cervical back: Neck supple.      Right lower leg: Edema present.      Left lower leg: Edema present.   Skin:     General: Skin is warm and dry.    Neurological:      General: No focal deficit present.      Mental Status: She is alert.   Psychiatric:         Mood and Affect: Mood normal.         Behavior: Behavior normal.           Assessment/Plan   3/15/2024  1.  Atypical chest pain: Patient with negative troponins.  Scheduled for cardiac CTA for further evaluation.  Will go ahead and pursue that today.  Will give her metoprolol 50 mg once prior to CT.  2.  Lower extremity edema: Unsure if this is related to HFpEF versus venous insufficiency.  Will go ahead and check an echocardiogram to evaluate LV function.  3.  Would otherwise continue the patient's current medical therapy.  Further recommendations to follow.    Peripheral IV 03/14/24 20 G Right Antecubital (Active)   Site Assessment Clean;Dry;Intact 03/15/24 0747   Dressing Status Clean;Dry 03/15/24 0747   Number of days: 1       Code Status:  Full Code    Prashant Gomez MD

## 2024-03-20 LAB — BODY SURFACE AREA: 2.22 M2

## 2024-03-21 ENCOUNTER — OFFICE VISIT (OUTPATIENT)
Dept: CARDIOLOGY | Facility: CLINIC | Age: 59
End: 2024-03-21
Payer: COMMERCIAL

## 2024-03-21 ENCOUNTER — HOSPITAL ENCOUNTER (OUTPATIENT)
Dept: RADIOLOGY | Facility: CLINIC | Age: 59
Discharge: HOME | End: 2024-03-21
Payer: COMMERCIAL

## 2024-03-21 VITALS
BODY MASS INDEX: 46.01 KG/M2 | HEART RATE: 78 BPM | OXYGEN SATURATION: 96 % | WEIGHT: 250 LBS | SYSTOLIC BLOOD PRESSURE: 135 MMHG | DIASTOLIC BLOOD PRESSURE: 94 MMHG | HEIGHT: 62 IN

## 2024-03-21 DIAGNOSIS — I31.8 OTHER CHRONIC PERICARDITIS, UNSPECIFIED COMPLICATION STATUS (HHS-HCC): Primary | ICD-10-CM

## 2024-03-21 DIAGNOSIS — I31.8 OTHER CHRONIC PERICARDITIS, UNSPECIFIED COMPLICATION STATUS (HHS-HCC): ICD-10-CM

## 2024-03-21 DIAGNOSIS — R07.89 CHEST DISCOMFORT: ICD-10-CM

## 2024-03-21 DIAGNOSIS — I50.32 HYPERTENSIVE HEART DISEASE WITH CHRONIC DIASTOLIC CONGESTIVE HEART FAILURE (MULTI): ICD-10-CM

## 2024-03-21 DIAGNOSIS — I11.0 HYPERTENSIVE HEART DISEASE WITH CHRONIC DIASTOLIC CONGESTIVE HEART FAILURE (MULTI): ICD-10-CM

## 2024-03-21 PROBLEM — I31.9 CHRONIC PERICARDITIS (HHS-HCC): Status: ACTIVE | Noted: 2024-03-21

## 2024-03-21 PROBLEM — D12.3 BENIGN NEOPLASM OF TRANSVERSE COLON: Status: ACTIVE | Noted: 2024-02-26

## 2024-03-21 LAB
ATRIAL RATE: 69 BPM
P AXIS: 11 DEGREES
P OFFSET: 204 MS
P ONSET: 142 MS
PR INTERVAL: 162 MS
Q ONSET: 223 MS
QRS COUNT: 12 BEATS
QRS DURATION: 94 MS
QT INTERVAL: 410 MS
QTC CALCULATION(BAZETT): 439 MS
QTC FREDERICIA: 429 MS
R AXIS: -2 DEGREES
T AXIS: 3 DEGREES
T OFFSET: 428 MS
VENTRICULAR RATE: 69 BPM

## 2024-03-21 PROCEDURE — 3075F SYST BP GE 130 - 139MM HG: CPT | Performed by: PHYSICIAN ASSISTANT

## 2024-03-21 PROCEDURE — 3044F HG A1C LEVEL LT 7.0%: CPT | Performed by: PHYSICIAN ASSISTANT

## 2024-03-21 PROCEDURE — 99214 OFFICE O/P EST MOD 30 MIN: CPT | Performed by: PHYSICIAN ASSISTANT

## 2024-03-21 PROCEDURE — 3080F DIAST BP >= 90 MM HG: CPT | Performed by: PHYSICIAN ASSISTANT

## 2024-03-21 PROCEDURE — 3050F LDL-C >= 130 MG/DL: CPT | Performed by: PHYSICIAN ASSISTANT

## 2024-03-21 RX ORDER — COLCHICINE 0.6 MG/1
0.6 TABLET ORAL 2 TIMES DAILY
Qty: 60 TABLET | Refills: 0 | Status: SHIPPED | OUTPATIENT
Start: 2024-03-21 | End: 2024-03-21

## 2024-03-21 RX ORDER — ROPINIROLE 0.5 MG/1
0.5 TABLET, FILM COATED ORAL NIGHTLY
Status: CANCELLED | OUTPATIENT
Start: 2024-03-21

## 2024-03-21 RX ORDER — COLCHICINE 0.6 MG/1
0.6 TABLET ORAL 2 TIMES DAILY
Qty: 60 TABLET | Refills: 0 | Status: SHIPPED | OUTPATIENT
Start: 2024-03-21 | End: 2024-04-20

## 2024-03-21 RX ORDER — COLCHICINE 0.6 MG/1
0.6 TABLET ORAL 2 TIMES DAILY
Qty: 60 TABLET | Refills: 0 | Status: CANCELLED | OUTPATIENT
Start: 2024-03-21 | End: 2024-04-20

## 2024-03-21 RX ORDER — CHLORTHALIDONE 25 MG/1
25 TABLET ORAL
Qty: 30 TABLET | Refills: 11 | Status: SHIPPED | OUTPATIENT
Start: 2024-03-21

## 2024-03-21 NOTE — PROGRESS NOTES
"Chief Complaint:   Follow-up (Patient is here for chest pain)     History Of Present Illness:    Josemanuel Reinoso is a 58 y.o. female presenting with chronic and recurrent atypical chest pain.  Patient was recently hospitalized with these symptoms, acute ischemia was ruled out, therefore she underwent coronary CTA displaying normal coronary anatomy without evidence of underlying CAD.  Previously patient underwent LHC with Dr. Pearson in 2017 displaying normal coronary anatomy at that time as well.  Chest pain is somewhat pleuritic in nature, and nearly constant.  Due to patient's underlying autoimmunity and diagnosis of SLE I do feel this may represent symptoms of pericarditis.  Results of all lab tests, cardiac imaging, and coronary evaluation discussed with patient on exam today.     Last Recorded Vitals:  Vitals:    03/21/24 1006   BP: (!) 135/94   BP Location: Left arm   Patient Position: Sitting   BP Cuff Size: Adult   Pulse: 78   SpO2: 96%   Weight: 113 kg (250 lb)   Height: 1.575 m (5' 2\")       Past Medical History:  She has a past medical history of Personal history of other diseases of the circulatory system and Personal history of other endocrine, nutritional and metabolic disease.    Past Surgical History:  She has a past surgical history that includes MR angio head wo IV contrast (7/14/2019); MR angio neck wo IV contrast (7/14/2019); and MR angio head wo IV contrast (7/14/2019).      Social History:  She reports that she has quit smoking. Her smoking use included cigarettes. She has never used smokeless tobacco. She reports that she does not drink alcohol and does not use drugs.    Family History:  No family history on file.     Allergies:  Azithromycin, Doxycycline, Erythromycin, Fluticasone propion-salmeterol, Ibuprofen, Lamotrigine, Metformin, Penicillins, Pregabalin, Estradiol, and Nickel    Outpatient Medications:  Current Outpatient Medications   Medication Instructions    albuterol 90 mcg/actuation " inhaler inhale 1 to 2 puffs by mouth every 4 hours Inhalation    albuterol 2.5 mg, nebulization, Every 6 hours PRN    amitriptyline (Elavil) 10 mg tablet 1 tablet, oral, Nightly    bisacodyl (Dulcolax) 5 mg EC tablet take 1 tablet by mouth once daily if needed for constipation    cetirizine (ZYRTEC) 10 mg, oral, Daily    chlorthalidone (HYGROTON) 25 mg, oral, Daily RT    cyanocobalamin (Vitamin B-12) 1,000 mcg tablet 1 tablet, oral, Daily RT    docusate sodium (COLACE) 100 mg, oral, 2 times daily PRN    empagliflozin (Jardiance) 10 mg 1 tablet, oral, Daily with breakfast    ergocalciferol (VITAMIN D-2) 1.25 mg, oral, Weekly    famotidine (PEPCID) 40 mg, oral, Nightly    fluticasone (Flonase) 50 mcg/actuation nasal spray 1 spray, Does not apply, 2 times daily PRN    fluticasone-umeclidin-vilanter (Trelegy Ellipta) 200-62.5-25 mcg blister with device 1 puff, inhalation, Daily RT    hyoscyamine 0.125 mg disintegrating tablet DISSOLVE 1 TABLET UNDER THE TONGUE EVERY 4 HOURS AS NEEDED    ipratropium-albuteroL (Duo-Neb) 0.5-2.5 mg/3 mL nebulizer solution 3 mL, inhalation, Every 6 hours PRN    lidocaine (Lidoderm) 5 % patch 1 patch, transdermal, Daily PRN    melatonin 5 mg, oral, Daily PRN    metoclopramide (Reglan) 5 mg tablet 1 tablet, oral, Daily    metoprolol succinate XL (TOPROL-XL) 50 mg, oral, Daily RT    miconazole (Micotin) 2 % cream 1 Application, Topical, As needed, To abdomen    montelukast (SINGULAIR) 10 mg, oral, Daily PRN    ondansetron (Zofran) 4 mg tablet 1 tablet, oral, Every 6 hours PRN    oxybutynin XL (DITROPAN-XL) 10 mg, oral, Daily    oxyCODONE-acetaminophen (Percocet) 5-325 mg tablet 1 tablet, oral, 2 times daily    pantoprazole (PROTONIX) 40 mg, oral, Daily, Do not crush, chew, or split.    Pulmicort Flexhaler 90 mcg/actuation inhaler 1-2 puffs, inhalation, 2 times daily RT    rOPINIRole (REQUIP) 0.5 mg, oral, Nightly    rosuvastatin (Crestor) 20 mg tablet Take 1 tablet (20 mg) by mouth once daily.        Physical Exam:  Constitutional: awake and alert, oriented ×3, no apparent distress  Skin: warm, dry, good turgor no obvious lesions  Eyes: pupils equal, round, reactive to light, conjunctiva pink and noninjected, no discharge  HENT: normocephalic and atraumatic, mucous membranes moist, trachea midline with no masses/goiter  Cardiovascular: S1/S2 regular, no murmur no rubs/gallops, no carotid bruits, no JVD  Pulmonary: symmetrical chest expansion, lungs are clear to auscultation bilaterally, no wheezes/rales/rhonchi, normal effort  Abdomen: nontender, nondistended, active bowel sounds, no ascites  Extremities: no cyanosis, clubbing, no LE edema no lesions; palpable pedal pulses  Neurologic: cranial nerves II - XII grossly intact, stable gait, no tremor       Last Labs:  CBC -  Lab Results   Component Value Date    WBC 9.8 03/15/2024    HGB 10.9 (L) 03/15/2024    HCT 36.0 03/15/2024    MCV 84 03/15/2024     03/15/2024       CMP -  Lab Results   Component Value Date    CALCIUM 8.3 (L) 03/15/2024    PROT 6.9 03/14/2024    ALBUMIN 3.9 03/14/2024    AST 15 03/14/2024    ALT 9 03/14/2024    ALKPHOS 83 03/14/2024    BILITOT 0.3 03/14/2024       LIPID PANEL -   Lab Results   Component Value Date    CHOL 192 03/15/2024    TRIG 131 03/15/2024    HDL 36.3 03/15/2024    CHHDL 5.3 03/15/2024    VLDL 26 03/15/2024    NHDL 156 (H) 03/15/2024       RENAL FUNCTION PANEL -   Lab Results   Component Value Date    GLUCOSE 92 03/15/2024     03/15/2024    K 3.9 03/15/2024     (H) 03/15/2024    CO2 25 03/15/2024    ANIONGAP 13 03/15/2024    BUN 10 03/15/2024    CREATININE 0.80 03/15/2024    CALCIUM 8.3 (L) 03/15/2024    ALBUMIN 3.9 03/14/2024        Lab Results   Component Value Date     (H) 03/14/2024    HGBA1C 6.8 (H) 03/14/2024       Last Cardiology Tests:  ECG:  ECG 12 Lead 03/14/2024 (Preliminary)      Echo:  Transthoracic Echo Complete 03/15/2024  1. Left ventricular systolic function is low normal  with a 50-55% estimated ejection fraction.   2. There is concentric left ventricular hypertrophy.   3. RVSP within normal limits.    Ejection Fractions:  EF   Date/Time Value Ref Range Status   03/15/2024 01:38 PM 49 %        Cath:  No results found for this or any previous visit from the past 1095 days.      Stress Test:  NUCLEAR STRESS TEST 07/31/2023      Cardiac Imaging:  CT angio coronary arteries w C EVAL of cardiac structure morphology 03/15/2024  1. Right dominant system.  2. Study is limited by motion and misregistration artifact, however  there appears to be normal coronary anatomy without clear evidence of  atherosclerotic changes or stenotic disease.  3. Small hiatal hernia.    Assessment/Plan   Problem List Items Addressed This Visit             ICD-10-CM       Cardiac and Vasculature    Chest discomfort R07.89    Relevant Medications    chlorthalidone (Hygroton) 25 mg tablet    Other Relevant Orders    XR chest 2 views    Hypertensive heart disease I11.9    Chronic pericarditis - Primary I31.9    Relevant Medications    colchicine 0.6 mg tablet    chlorthalidone (Hygroton) 25 mg tablet    Other Relevant Orders    XR chest 2 views     -No evidence of coronary disease on recent coronary CTA, [facility] in 2017 displaying no coronary disease    -Colchicine 0.6mg BID x 30 days to assess for symptomatic improvement    -F/U with Dr. Browning as previously scheduled    Jose Arias PA-C

## 2024-03-22 ENCOUNTER — TELEPHONE (OUTPATIENT)
Dept: CARDIOLOGY | Facility: CLINIC | Age: 59
End: 2024-03-22
Payer: COMMERCIAL

## 2024-03-22 NOTE — TELEPHONE ENCOUNTER
Left detailed message that Dr. Browning reviewed her cardiac monitor and to continue her current medications as prescribed.  If she is having symptoms, please call and let us know as he can adjust medications.  She does have follow up in April with Dr. Browning.

## 2024-03-22 NOTE — TELEPHONE ENCOUNTER
----- Message from Osito Browning MD PhD sent at 3/22/2024  1:34 PM EDT -----  Notify patient of holter results. We can continue current dose of metoprolol. Increase to 75mg daily if symptomatic.

## 2024-03-28 ENCOUNTER — TELEPHONE (OUTPATIENT)
Dept: CARDIOLOGY | Facility: CLINIC | Age: 59
End: 2024-03-28
Payer: COMMERCIAL

## 2024-04-08 PROBLEM — I15.9 SECONDARY HYPERTENSION: Status: RESOLVED | Noted: 2022-10-18 | Resolved: 2024-04-08

## 2024-04-11 ENCOUNTER — APPOINTMENT (OUTPATIENT)
Dept: RADIOLOGY | Facility: HOSPITAL | Age: 59
End: 2024-04-11
Payer: COMMERCIAL

## 2024-04-11 ENCOUNTER — HOSPITAL ENCOUNTER (OUTPATIENT)
Dept: RADIOLOGY | Facility: HOSPITAL | Age: 59
End: 2024-04-11
Payer: COMMERCIAL

## 2024-04-18 ENCOUNTER — HOSPITAL ENCOUNTER (OUTPATIENT)
Dept: RADIOLOGY | Facility: HOSPITAL | Age: 59
Discharge: HOME | End: 2024-04-18
Payer: COMMERCIAL

## 2024-04-18 ENCOUNTER — APPOINTMENT (OUTPATIENT)
Dept: CARDIOLOGY | Facility: CLINIC | Age: 59
End: 2024-04-18
Payer: COMMERCIAL

## 2024-04-18 DIAGNOSIS — K21.9 GASTROESOPHAGEAL REFLUX DISEASE WITHOUT ESOPHAGITIS: ICD-10-CM

## 2024-04-18 PROCEDURE — 3430000001 HC RX 343 DIAGNOSTIC RADIOPHARMACEUTICALS: Performed by: INTERNAL MEDICINE

## 2024-04-18 PROCEDURE — 78264 GASTRIC EMPTYING IMG STUDY: CPT | Performed by: NUCLEAR MEDICINE

## 2024-04-18 PROCEDURE — 78264 GASTRIC EMPTYING IMG STUDY: CPT

## 2024-04-18 RX ADMIN — Medication 1.4 MILLICURIE: at 08:00

## 2024-04-23 ENCOUNTER — HOSPITAL ENCOUNTER (OUTPATIENT)
Dept: RADIOLOGY | Facility: HOSPITAL | Age: 59
Discharge: HOME | End: 2024-04-23
Payer: COMMERCIAL

## 2024-04-23 ENCOUNTER — APPOINTMENT (OUTPATIENT)
Dept: GASTROENTEROLOGY | Facility: HOSPITAL | Age: 59
End: 2024-04-23
Payer: COMMERCIAL

## 2024-04-23 VITALS
SYSTOLIC BLOOD PRESSURE: 162 MMHG | DIASTOLIC BLOOD PRESSURE: 100 MMHG | RESPIRATION RATE: 18 BRPM | HEIGHT: 62 IN | OXYGEN SATURATION: 97 % | HEART RATE: 70 BPM | BODY MASS INDEX: 45.84 KG/M2 | WEIGHT: 249.12 LBS

## 2024-04-23 DIAGNOSIS — I50.32 HYPERTENSIVE HEART DISEASE WITH CHRONIC DIASTOLIC CONGESTIVE HEART FAILURE (MULTI): ICD-10-CM

## 2024-04-23 DIAGNOSIS — R00.2 PALPITATIONS: ICD-10-CM

## 2024-04-23 DIAGNOSIS — I10 ESSENTIAL HYPERTENSION: Primary | Chronic | ICD-10-CM

## 2024-04-23 DIAGNOSIS — I11.0 HYPERTENSIVE HEART DISEASE WITH CHRONIC DIASTOLIC CONGESTIVE HEART FAILURE (MULTI): ICD-10-CM

## 2024-04-23 DIAGNOSIS — I50.9 CHRONIC HEART FAILURE, UNSPECIFIED HEART FAILURE TYPE (MULTI): ICD-10-CM

## 2024-04-23 DIAGNOSIS — E78.5 HYPERLIPIDEMIA, UNSPECIFIED HYPERLIPIDEMIA TYPE: ICD-10-CM

## 2024-04-23 DIAGNOSIS — R07.9 CHEST PAIN, UNSPECIFIED TYPE: ICD-10-CM

## 2024-04-23 PROCEDURE — 2550000001 HC RX 255 CONTRASTS: Performed by: STUDENT IN AN ORGANIZED HEALTH CARE EDUCATION/TRAINING PROGRAM

## 2024-04-23 PROCEDURE — 75574 CT ANGIO HRT W/3D IMAGE: CPT | Performed by: RADIOLOGY

## 2024-04-23 PROCEDURE — 2500000001 HC RX 250 WO HCPCS SELF ADMINISTERED DRUGS (ALT 637 FOR MEDICARE OP): Performed by: STUDENT IN AN ORGANIZED HEALTH CARE EDUCATION/TRAINING PROGRAM

## 2024-04-23 PROCEDURE — 75574 CT ANGIO HRT W/3D IMAGE: CPT

## 2024-04-23 PROCEDURE — 2500000005 HC RX 250 GENERAL PHARMACY W/O HCPCS: Performed by: STUDENT IN AN ORGANIZED HEALTH CARE EDUCATION/TRAINING PROGRAM

## 2024-04-23 RX ORDER — METOPROLOL TARTRATE 1 MG/ML
5 INJECTION, SOLUTION INTRAVENOUS EVERY 5 MIN PRN
Status: COMPLETED | OUTPATIENT
Start: 2024-04-23 | End: 2024-04-23

## 2024-04-23 RX ORDER — NITROGLYCERIN 0.4 MG/1
0.8 TABLET SUBLINGUAL EVERY 5 MIN PRN
Status: DISCONTINUED | OUTPATIENT
Start: 2024-04-23 | End: 2024-04-25 | Stop reason: HOSPADM

## 2024-04-23 RX ADMIN — METOPROLOL TARTRATE 5 MG: 5 INJECTION INTRAVENOUS at 13:27

## 2024-04-23 RX ADMIN — METOPROLOL TARTRATE 5 MG: 5 INJECTION INTRAVENOUS at 13:32

## 2024-04-23 RX ADMIN — METOPROLOL TARTRATE 5 MG: 5 INJECTION INTRAVENOUS at 13:22

## 2024-04-23 RX ADMIN — IOHEXOL 90 ML: 350 INJECTION, SOLUTION INTRAVENOUS at 14:48

## 2024-04-23 RX ADMIN — METOPROLOL TARTRATE 5 MG: 5 INJECTION INTRAVENOUS at 14:06

## 2024-04-23 RX ADMIN — METOPROLOL TARTRATE 5 MG: 5 INJECTION INTRAVENOUS at 14:14

## 2024-04-23 RX ADMIN — NITROGLYCERIN 0.8 MG: 0.4 TABLET SUBLINGUAL at 14:19

## 2024-04-23 ASSESSMENT — PAIN SCALES - GENERAL: PAINLEVEL_OUTOF10: 0 - NO PAIN

## 2024-04-23 ASSESSMENT — PAIN - FUNCTIONAL ASSESSMENT: PAIN_FUNCTIONAL_ASSESSMENT: 0-10

## 2024-04-29 ENCOUNTER — OFFICE VISIT (OUTPATIENT)
Dept: GASTROENTEROLOGY | Facility: CLINIC | Age: 59
End: 2024-04-29
Payer: COMMERCIAL

## 2024-04-29 ENCOUNTER — OFFICE VISIT (OUTPATIENT)
Dept: CARDIOLOGY | Facility: CLINIC | Age: 59
End: 2024-04-29
Payer: COMMERCIAL

## 2024-04-29 ENCOUNTER — LAB (OUTPATIENT)
Dept: LAB | Facility: LAB | Age: 59
End: 2024-04-29
Payer: COMMERCIAL

## 2024-04-29 VITALS
DIASTOLIC BLOOD PRESSURE: 82 MMHG | HEART RATE: 73 BPM | WEIGHT: 254.6 LBS | SYSTOLIC BLOOD PRESSURE: 148 MMHG | BODY MASS INDEX: 46.85 KG/M2 | OXYGEN SATURATION: 97 % | HEIGHT: 62 IN

## 2024-04-29 VITALS — DIASTOLIC BLOOD PRESSURE: 68 MMHG | SYSTOLIC BLOOD PRESSURE: 117 MMHG | TEMPERATURE: 98.2 F | HEART RATE: 76 BPM

## 2024-04-29 DIAGNOSIS — I49.3 PVC (PREMATURE VENTRICULAR CONTRACTION): ICD-10-CM

## 2024-04-29 DIAGNOSIS — E78.5 HYPERLIPIDEMIA, UNSPECIFIED HYPERLIPIDEMIA TYPE: ICD-10-CM

## 2024-04-29 DIAGNOSIS — R10.13 EPIGASTRIC PAIN: Primary | ICD-10-CM

## 2024-04-29 DIAGNOSIS — I50.32 HYPERTENSIVE HEART DISEASE WITH CHRONIC DIASTOLIC CONGESTIVE HEART FAILURE (MULTI): ICD-10-CM

## 2024-04-29 DIAGNOSIS — I50.9 CHRONIC HEART FAILURE, UNSPECIFIED HEART FAILURE TYPE (MULTI): Primary | ICD-10-CM

## 2024-04-29 DIAGNOSIS — I50.9 CHRONIC HEART FAILURE, UNSPECIFIED HEART FAILURE TYPE (MULTI): ICD-10-CM

## 2024-04-29 DIAGNOSIS — I11.0 HYPERTENSIVE HEART DISEASE WITH CHRONIC DIASTOLIC CONGESTIVE HEART FAILURE (MULTI): ICD-10-CM

## 2024-04-29 DIAGNOSIS — I10 ESSENTIAL HYPERTENSION: Chronic | ICD-10-CM

## 2024-04-29 DIAGNOSIS — R00.2 PALPITATIONS: ICD-10-CM

## 2024-04-29 DIAGNOSIS — R07.89 CHEST DISCOMFORT: ICD-10-CM

## 2024-04-29 DIAGNOSIS — R07.9 CHEST PAIN, UNSPECIFIED TYPE: ICD-10-CM

## 2024-04-29 LAB
CHOLEST SERPL-MCNC: 208 MG/DL (ref 0–199)
CHOLESTEROL/HDL RATIO: 4.7
ERYTHROCYTE [DISTWIDTH] IN BLOOD BY AUTOMATED COUNT: 16.2 % (ref 11.5–14.5)
FERRITIN SERPL-MCNC: 63 NG/ML (ref 8–150)
HCT VFR BLD AUTO: 43.2 % (ref 36–46)
HDLC SERPL-MCNC: 44.7 MG/DL
HGB BLD-MCNC: 13.7 G/DL (ref 12–16)
IRON SATN MFR SERPL: 18 % (ref 25–45)
IRON SERPL-MCNC: 68 UG/DL (ref 35–150)
LDLC SERPL CALC-MCNC: 133 MG/DL
MCH RBC QN AUTO: 26.4 PG (ref 26–34)
MCHC RBC AUTO-ENTMCNC: 31.7 G/DL (ref 32–36)
MCV RBC AUTO: 83 FL (ref 80–100)
NON HDL CHOLESTEROL: 163 MG/DL (ref 0–149)
NRBC BLD-RTO: 0 /100 WBCS (ref 0–0)
PLATELET # BLD AUTO: 320 X10*3/UL (ref 150–450)
RBC # BLD AUTO: 5.18 X10*6/UL (ref 4–5.2)
TIBC SERPL-MCNC: 375 UG/DL (ref 240–445)
TRIGL SERPL-MCNC: 154 MG/DL (ref 0–149)
UIBC SERPL-MCNC: 307 UG/DL (ref 110–370)
VLDL: 31 MG/DL (ref 0–40)
WBC # BLD AUTO: 6.6 X10*3/UL (ref 4.4–11.3)

## 2024-04-29 PROCEDURE — 85027 COMPLETE CBC AUTOMATED: CPT

## 2024-04-29 PROCEDURE — 83550 IRON BINDING TEST: CPT

## 2024-04-29 PROCEDURE — 3050F LDL-C >= 130 MG/DL: CPT | Performed by: STUDENT IN AN ORGANIZED HEALTH CARE EDUCATION/TRAINING PROGRAM

## 2024-04-29 PROCEDURE — 99214 OFFICE O/P EST MOD 30 MIN: CPT | Performed by: STUDENT IN AN ORGANIZED HEALTH CARE EDUCATION/TRAINING PROGRAM

## 2024-04-29 PROCEDURE — 82728 ASSAY OF FERRITIN: CPT

## 2024-04-29 PROCEDURE — 80061 LIPID PANEL: CPT

## 2024-04-29 PROCEDURE — 3044F HG A1C LEVEL LT 7.0%: CPT | Performed by: STUDENT IN AN ORGANIZED HEALTH CARE EDUCATION/TRAINING PROGRAM

## 2024-04-29 PROCEDURE — 3074F SYST BP LT 130 MM HG: CPT | Performed by: INTERNAL MEDICINE

## 2024-04-29 PROCEDURE — 3050F LDL-C >= 130 MG/DL: CPT | Performed by: INTERNAL MEDICINE

## 2024-04-29 PROCEDURE — 3078F DIAST BP <80 MM HG: CPT | Performed by: INTERNAL MEDICINE

## 2024-04-29 PROCEDURE — 3044F HG A1C LEVEL LT 7.0%: CPT | Performed by: INTERNAL MEDICINE

## 2024-04-29 PROCEDURE — 83540 ASSAY OF IRON: CPT

## 2024-04-29 PROCEDURE — 99214 OFFICE O/P EST MOD 30 MIN: CPT | Performed by: INTERNAL MEDICINE

## 2024-04-29 PROCEDURE — 36415 COLL VENOUS BLD VENIPUNCTURE: CPT

## 2024-04-29 PROCEDURE — 3077F SYST BP >= 140 MM HG: CPT | Performed by: STUDENT IN AN ORGANIZED HEALTH CARE EDUCATION/TRAINING PROGRAM

## 2024-04-29 PROCEDURE — 3079F DIAST BP 80-89 MM HG: CPT | Performed by: STUDENT IN AN ORGANIZED HEALTH CARE EDUCATION/TRAINING PROGRAM

## 2024-04-29 RX ORDER — DEXLANSOPRAZOLE 60 MG/1
60 CAPSULE, DELAYED RELEASE ORAL DAILY
Qty: 30 CAPSULE | Refills: 5 | Status: SHIPPED | OUTPATIENT
Start: 2024-04-29 | End: 2024-05-23

## 2024-04-29 RX ORDER — MAG CARB/ALUMINUM HYDROX/ALGIN 358-95/15
16 SUSPENSION, ORAL (FINAL DOSE FORM) ORAL EVERY 6 HOURS PRN
Qty: 1000 ML | Refills: 11 | Status: SHIPPED | OUTPATIENT
Start: 2024-04-29 | End: 2025-04-29

## 2024-04-29 RX ORDER — FUROSEMIDE 20 MG/1
20 TABLET ORAL DAILY PRN
Qty: 30 TABLET | Refills: 11 | Status: SHIPPED | OUTPATIENT
Start: 2024-04-29 | End: 2025-04-29

## 2024-04-29 RX ORDER — OXYCODONE AND ACETAMINOPHEN 7.5; 325 MG/1; MG/1
1 TABLET ORAL EVERY 6 HOURS PRN
COMMUNITY
Start: 2024-04-26

## 2024-04-29 RX ORDER — CARVEDILOL 25 MG/1
25 TABLET ORAL
Qty: 60 TABLET | Refills: 11 | Status: SHIPPED | OUTPATIENT
Start: 2024-04-29 | End: 2025-04-29

## 2024-04-29 ASSESSMENT — PAIN SCALES - GENERAL: PAINLEVEL: 8

## 2024-04-29 NOTE — PROGRESS NOTES
History Of Present Illness  Josemanuel Reinoso is a 58 y.o. female who presents as a follow up for abdominal pain.     Patient has a PMHx of morbid obesity, history of abdominal pain, GERD, chart diagnosis of gastroparesis. S/P ventral hernia repair in 2018. She is s/p ventral hernia repair in 2018. Supposedly had an abnormal gastric emptying study OSH, but cannot achieve records. She is on chronic PPI therapy.    Reports that she is having stomach pain, epigastric pain that is constant but has flares that come at random times. Flares involving severe stomach pains and nausea/vomiting. Reports dark brown vomit, denies coffee ground emesis though or hematemesis. Reports that it is worse with bending over, reports it is worse with eating. Reports that most of the time she has 1 BM per day that are soft, formed. Reports that she had maroon colored stools 2 weeks ago for two days, denied eating any red foods or beets. Denies any red stools, black stools since then. Reports she has started taking the reglan which helps with her bowel movements per patients but not with the pain. Additionally endorses generalized pain in her trunk and extremities, history of fibromyalgia as well as SLE. Reports that the rash is gone from previous.    Symptoms point away from gallbladder stones/sludge as the pain is more constant and does not wax/wane. But will evaluate the stones/CBD with liver US.    CT scan demonstrated nonspecific colonic inflammation on her CT scan that was performed 02/07/2024, as well as some gallbladder sludge/small stones.     Past Medical History  Past Medical History:   Diagnosis Date    Personal history of other diseases of the circulatory system     History of hypertension    Personal history of other endocrine, nutritional and metabolic disease     History of diabetes mellitus       Surgical History  Past Surgical History:   Procedure Laterality Date    MR HEAD ANGIO WO IV CONTRAST  7/14/2019    MR HEAD ANGIO WO  IV CONTRAST [facility]GENCY [facility]GAC[facility]    MR HEAD ANGIO WO IV CONTRAST  7/14/2019    MR HEAD ANGIO WO IV CONTRAST [facility]    MR NECK ANGIO WO IV CONTRAST  7/14/2019    MR NECK ANGIO WO IV CONTRAST [facility] EMERGENCY LEGACY        Social History  She reports that she has quit smoking. Her smoking use included cigarettes. She has never used smokeless tobacco. She reports that she does not drink alcohol and does not use drugs.    Family History  No family history on file.     Allergies  Azithromycin, Doxycycline, Erythromycin, Fluticasone propion-salmeterol, Ibuprofen, Lamotrigine, Metformin, Penicillins, Pregabalin, Estradiol, and Nickel    Last Recorded Vitals  /68   Pulse 76   Temp 36.8 °C (98.2 °F)        Physical Exam  Vitals reviewed.   Constitutional:       Appearance: Normal appearance.   Cardiovascular:      Rate and Rhythm: Normal rate and regular rhythm.   Pulmonary:      Effort: Pulmonary effort is normal.      Breath sounds: Normal breath sounds.   Abdominal:      Palpations: Abdomen is soft. There is no mass.      Tenderness: There is no abdominal tenderness.      Hernia: A hernia is present.      Comments: reproducible   Neurological:      Mental Status: She is alert.           Labs  Lab Results   Component Value Date    GLUCOSE 92 03/15/2024    CALCIUM 8.3 (L) 03/15/2024     03/15/2024    K 3.9 03/15/2024    CO2 25 03/15/2024     (H) 03/15/2024    BUN 10 03/15/2024    CREATININE 0.80 03/15/2024     Lab Results   Component Value Date    WBC 9.8 03/15/2024    HGB 10.9 (L) 03/15/2024    HCT 36.0 03/15/2024    MCV 84 03/15/2024     03/15/2024     03/15/2024    K 3.9 03/15/2024     (H) 03/15/2024    CO2 25 03/15/2024    BUN 10 03/15/2024    CREATININE 0.80 03/15/2024    CALCIUM 8.3 (L) 03/15/2024    PROT 6.9 03/14/2024    BILITOT 0.3 03/14/2024    ALKPHOS 83 03/14/2024    ALT 9 03/14/2024    AST 15 03/14/2024    GLUCOSE 92 03/15/2024           Imaging    IMPRESSION:  1.  Wall thickening versus incomplete distension of portions of colon  particularly ascending as well as descending and distal colon. There  is also submucosal fat deposition involving these areas of colon  which can be seen in the setting of chronic inflammation/colitis  although mild acute component cannot be entirely excluded. There is a  small amount of free pelvic fluid. There are also some suspected  areas of fold thickening scattered throughout the colon. Correlation  with colonoscopy may be considered as a means of further assessment.  2. Foci of increased density/stranding and thickening involving the  midline anterior abdominal wall probably relating to postoperative  changes and similar to prior. Small fat containing anterior abdominal  wall hernias also similar. A few subcutaneous nodular densities  anterior abdominal wall bilaterally as described slightly more  pronounced. Clinical correlation and follow-up advised.  3. Possible sludge and/or small stones within the gallbladder.  4. Moderate nonspecific bladder wall thickening.  5. Mild iliac chain/pelvic adenopathy and mildly prominent  nonspecific abdominal nodes elsewhere grossly similar to prior.  6. 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  7. Additional findings as above       ASSESSMENT & PLAN  Problem List Items Addressed This Visit             ICD-10-CM    Epigastric pain - Primary R10.13    Relevant Orders    US gallbladder   Josemanuel Reinoso is a 59 yo female PMHx of morbid obesity, history of abdominal pain, GERD, chart diagnosis of gastroparesis with negative gastric emptying study who presents as a follow up for epigastric pain/nonspecific abdominal pain. CT scan demonstrated nonspecific findings of colonic inflammation.    PLAN:  -repeat colonoscopy this summer to evaluate his colonic inflammation on the CT scan, potential maroon colored stools she was having a few weeks back (hx of colon  adenoma)  -repeat liver US to evaluate to the gallbladder sludge/stones as seen on the CT scan  -stop reglan as this is not improving her abdominal pain  -continue PPI, will start gaviscon liquid PRN, will attempt to send prior authorization for Dexilant to see if we can get this approved as this pt believes this is the only medication that has worked for her  -continue Zofran as needed for nausea    I spent 30 minutes in the professional and overall care of this patient.    Staffed with the attending, Dr. Suresh.    Aspen Syed MD  PGY-1 Internal Medicine

## 2024-04-29 NOTE — PROGRESS NOTES
"Chief Complaint:   Heart Failure, Hyperlipidemia, Palpitations, and PVCs (2 month follow up)     History Of Present Illness:    Josemanuel Reinoso is a 58 y.o. female returns for follow-up.  She underwent a coronary CTA which was limited due to motion artifact from poor heart rate control however there is no evidence of proximal coronary obstructive disease.  She was seen in the hospital for an episode of chest discomfort.  Follow-up with PA and was initially started on colchicine.  Colchicine has not helped.  Her symptoms.  She continues to have on and off episodes of tachycardia particular she is exertional self.  She also complains of fatigue and tiredness.  More recently she has noted to have mild anemia with a hemoglobin of 10.8 which is new as compared to prior assessment.  She is also known on and off episodes of swelling in her legs.  Patient wore a 7-day Holter monitor and February which revealed 3% burden of PVCs and brief episodes of SVT.     Last Recorded Vitals:  Vitals:    04/29/24 1030   BP: 148/82   BP Location: Right arm   Patient Position: Sitting   BP Cuff Size: Large adult   Pulse: 73   SpO2: 97%   Weight: 115 kg (254 lb 9.6 oz)   Height: 1.575 m (5' 2\")       Review of Systems  ROS      Allergies:  Azithromycin, Doxycycline, Erythromycin, Fluticasone propion-salmeterol, Ibuprofen, Lamotrigine, Metformin, Penicillins, Pregabalin, Estradiol, and Nickel    Outpatient Medications:  Current Outpatient Medications   Medication Instructions    albuterol 90 mcg/actuation inhaler inhale 1 to 2 puffs by mouth every 4 hours Inhalation    albuterol 2.5 mg, nebulization, Every 6 hours PRN    amitriptyline (Elavil) 10 mg tablet 1 tablet, oral, Nightly    bisacodyl (Dulcolax) 5 mg EC tablet take 1 tablet by mouth once daily if needed for constipation    carvedilol (COREG) 25 mg, oral, 2 times daily with meals    cetirizine (ZYRTEC) 10 mg, oral, Daily    chlorthalidone (HYGROTON) 25 mg, oral, Daily RT    " cyanocobalamin (Vitamin B-12) 1,000 mcg tablet 1 tablet, oral, Daily RT    docusate sodium (COLACE) 100 mg, oral, 2 times daily PRN    empagliflozin (Jardiance) 10 mg 1 tablet, oral, Daily with breakfast    ergocalciferol (VITAMIN D-2) 1.25 mg, oral, Once Weekly    famotidine (PEPCID) 40 mg, oral, Nightly    fluticasone (Flonase) 50 mcg/actuation nasal spray 1 spray, Does not apply, 2 times daily PRN    fluticasone-umeclidin-vilanter (Trelegy Ellipta) 200-62.5-25 mcg blister with device 1 puff, inhalation, Daily RT    furosemide (LASIX) 20 mg, oral, Daily PRN    hyoscyamine 0.125 mg disintegrating tablet DISSOLVE 1 TABLET UNDER THE TONGUE EVERY 4 HOURS AS NEEDED    ipratropium-albuteroL (Duo-Neb) 0.5-2.5 mg/3 mL nebulizer solution 3 mL, inhalation, Every 6 hours PRN    lidocaine (Lidoderm) 5 % patch 1 patch, transdermal, Daily PRN    melatonin 5 mg, oral, Daily PRN    metoclopramide (Reglan) 5 mg tablet 1 tablet, oral, Daily    miconazole (Micotin) 2 % cream 1 Application, Topical, As needed, To abdomen    montelukast (SINGULAIR) 10 mg, oral, Daily PRN    ondansetron (Zofran) 4 mg tablet 1 tablet, oral, Every 6 hours PRN    oxybutynin XL (DITROPAN-XL) 10 mg, oral, Daily    oxyCODONE-acetaminophen (Percocet) 7.5-325 mg tablet 1 tablet, oral, Every 6 hours PRN    pantoprazole (PROTONIX) 40 mg, oral, Daily, Do not crush, chew, or split.    Pulmicort Flexhaler 90 mcg/actuation inhaler 1-2 puffs, inhalation, 2 times daily RT    rOPINIRole (REQUIP) 0.5 mg, oral, Nightly    rosuvastatin (Crestor) 20 mg tablet Take 1 tablet (20 mg) by mouth once daily.       Physical Exam:  General: No acute distress,  A&O x3, obese female  Skin: Warm and dry  Neck: JVD is not elevated  ENT: Moist mucous membranes no lesions appreciated  Pulmonary: CTAB  Cards: Regular rate rhythm, no murmurs gallops or rubs appreciated normal S1-S2  Abdomen: Soft nontender nondistended  Extremities: Chronic swelling which is nonpitting and more  lymphedematous in nature  Psych: Appropriate mood and affect        Last Labs:  CBC -  Lab Results   Component Value Date    WBC 9.8 03/15/2024    HGB 10.9 (L) 03/15/2024    HCT 36.0 03/15/2024    MCV 84 03/15/2024     03/15/2024       CMP -  Lab Results   Component Value Date    CALCIUM 8.3 (L) 03/15/2024    PROT 6.9 03/14/2024    ALBUMIN 3.9 03/14/2024    AST 15 03/14/2024    ALT 9 03/14/2024    ALKPHOS 83 03/14/2024    BILITOT 0.3 03/14/2024       LIPID PANEL -   Lab Results   Component Value Date    CHOL 192 03/15/2024    TRIG 131 03/15/2024    HDL 36.3 03/15/2024    CHHDL 5.3 03/15/2024    VLDL 26 03/15/2024    NHDL 156 (H) 03/15/2024       RENAL FUNCTION PANEL -   Lab Results   Component Value Date    GLUCOSE 92 03/15/2024     03/15/2024    K 3.9 03/15/2024     (H) 03/15/2024    CO2 25 03/15/2024    ANIONGAP 13 03/15/2024    BUN 10 03/15/2024    CREATININE 0.80 03/15/2024    CALCIUM 8.3 (L) 03/15/2024    ALBUMIN 3.9 03/14/2024        Lab Results   Component Value Date     (H) 03/14/2024    HGBA1C 6.8 (H) 03/14/2024       Last Cardiology Tests:  ECG:  Encounter Date: 03/14/24   ECG 12 Lead   Result Value    Ventricular Rate 69    Atrial Rate 69    LA Interval 162    QRS Duration 94    QT Interval 410    QTC Calculation(Bazett) 439    P Axis 11    R Axis -2    T Axis 3    QRS Count 12    Q Onset 223    P Onset 142    P Offset 204    T Offset 428    QTC Fredericia 429    Narrative    Normal sinus rhythm  Minimal voltage criteria for LVH, may be normal variant ( R in aVL )  Nonspecific T wave abnormality  Abnormal ECG  When compared with ECG of 22-OCT-2021 11:30,  No significant change was found  See ED provider note for full interpretation and clinical correlation  Confirmed by Erika Webber ([id number]) on 3/21/2024 6:22:34 PM        Echo:  No results found for this or any previous visit from the past 1095 days.       Ejection Fractions:  EF   Date/Time Value Ref Range Status    03/15/2024 01:38 PM 49 %      Assessment and Plan    1.  PVCs/paroxysmal SVT: Confirmed with most recent Holter monitor.  Echo showed normal ejection fraction with LVH.  Baseline EKG reviewed.  We are switching metoprolol to carvedilol to improve blood pressure management and additional beta-blockade.  Would prefer patient come off of amitriptyline due to side effect of tachycardia.  For now she will continue current beta-blockade.    2.  Chest pain: Atypical: Pain is different in association with food or lupus flare.  She has had previous stress test 1 year ago although this was a regadenoson nuclear stress test.  Her calcium score is 0 which is limited in the context of chronic autoimmune disease.  Risk factors include hypertension, hyperlipidemia and type 2 diabetes.  Our plan is to check fasting lipid panel.  In light of ongoing abdominal discomfort and likely need for gallbladder intervention.  Coronary CTA did not show evidence of obstructive CAD.  She also was treated with a short course of colchicine which had not no impact on her symptoms.    3.  HFpEF: Chronic.  Volume status appears to be relatively controlled with Jardiance 10 mg daily and chlorthalidone.  Provide Lasix 20 mg to take as needed for weight gain or orthopnea symptoms.  Check BMP and BNP in 2 weeks.     4.  COPD/asthma: Management as per pulmonology.  Will try to limit exposure to beta-blockers in this context.    5.  Hypertension: LVH appreciated with echocardiogram.  Switch metoprolol to carvedilol 25 mg twice daily.  Blood pressure cuff provided.    6.  Normocytic anemia: Appreciated on CBC 1 month ago.  Patient is complaining of fatigue and tiredness.  Will check CBC with iron studies.  If abnormal we will provide iron supplements.    7.  Tobacco consumption: Precontemplative.  Advised smoking cessation.     Discontinue metoprolol  Advise discontinuation of amitriptyline  Start carvedilol 25 mg twice daily  Take Lasix 20 mg as  needed  Check CBC and iron studies today  Check BMP and BNP in 2 weeks  Blood pressure cuff prescription provided  Advised smoking cessation  Follow-up in 3 months    (This note was generated with voice recognition software and may contain errors including spelling, grammar, syntax and missed recognition of what was dictated, of which may not have been fully corrected)     Osito Browning MD PhD

## 2024-04-29 NOTE — PATIENT INSTRUCTIONS
Stop metoprolol  Recommend discontinuing amitriptyline   Start coreg 25mg bid  Take lasix 20mg as needed  Lab work today  Labs in 2 weeks  Stop smoking  Return in 3 months

## 2024-04-29 NOTE — PATIENT INSTRUCTIONS
We have ordered an ultrasound of the gallbladder though the pain does not sound like gallbladder pain. I would stop the metaclopromide as it is not helping the pain and may cause neurological side effects. The CT and gastric emptying studies were otherwise normal. You are scheduled for a colonoscopy later this year.

## 2024-05-10 ENCOUNTER — HOSPITAL ENCOUNTER (OUTPATIENT)
Dept: RADIOLOGY | Facility: CLINIC | Age: 59
Discharge: HOME | End: 2024-05-10
Payer: COMMERCIAL

## 2024-05-10 DIAGNOSIS — R10.13 EPIGASTRIC PAIN: ICD-10-CM

## 2024-05-10 PROCEDURE — 76705 ECHO EXAM OF ABDOMEN: CPT | Performed by: RADIOLOGY

## 2024-05-10 PROCEDURE — 76705 ECHO EXAM OF ABDOMEN: CPT

## 2024-05-16 ENCOUNTER — OFFICE VISIT (OUTPATIENT)
Dept: RHEUMATOLOGY | Facility: CLINIC | Age: 59
End: 2024-05-16
Payer: COMMERCIAL

## 2024-05-16 VITALS
HEART RATE: 75 BPM | BODY MASS INDEX: 46.01 KG/M2 | HEIGHT: 62 IN | DIASTOLIC BLOOD PRESSURE: 86 MMHG | SYSTOLIC BLOOD PRESSURE: 138 MMHG | TEMPERATURE: 98.8 F | WEIGHT: 250 LBS

## 2024-05-16 DIAGNOSIS — R76.8 ANA POSITIVE: Primary | ICD-10-CM

## 2024-05-16 DIAGNOSIS — M32.9 SYSTEMIC LUPUS ERYTHEMATOSUS, UNSPECIFIED SLE TYPE, UNSPECIFIED ORGAN INVOLVEMENT STATUS (MULTI): Primary | ICD-10-CM

## 2024-05-16 PROCEDURE — 3044F HG A1C LEVEL LT 7.0%: CPT | Performed by: INTERNAL MEDICINE

## 2024-05-16 PROCEDURE — 99214 OFFICE O/P EST MOD 30 MIN: CPT | Performed by: INTERNAL MEDICINE

## 2024-05-16 PROCEDURE — 3079F DIAST BP 80-89 MM HG: CPT | Performed by: INTERNAL MEDICINE

## 2024-05-16 PROCEDURE — 3075F SYST BP GE 130 - 139MM HG: CPT | Performed by: INTERNAL MEDICINE

## 2024-05-16 PROCEDURE — 3050F LDL-C >= 130 MG/DL: CPT | Performed by: INTERNAL MEDICINE

## 2024-05-16 RX ORDER — ALBUTEROL SULFATE 0.83 MG/ML
3 SOLUTION RESPIRATORY (INHALATION) AS NEEDED
OUTPATIENT
Start: 2024-06-03

## 2024-05-16 RX ORDER — FAMOTIDINE 10 MG/ML
20 INJECTION INTRAVENOUS ONCE AS NEEDED
OUTPATIENT
Start: 2024-05-20

## 2024-05-16 RX ORDER — FAMOTIDINE 10 MG/ML
20 INJECTION INTRAVENOUS ONCE AS NEEDED
OUTPATIENT
Start: 2024-06-03

## 2024-05-16 RX ORDER — ALBUTEROL SULFATE 0.83 MG/ML
3 SOLUTION RESPIRATORY (INHALATION) AS NEEDED
OUTPATIENT
Start: 2024-05-20

## 2024-05-16 RX ORDER — MELOXICAM 15 MG/1
15 TABLET ORAL DAILY
COMMUNITY

## 2024-05-16 RX ORDER — EPINEPHRINE 0.3 MG/.3ML
0.3 INJECTION SUBCUTANEOUS EVERY 5 MIN PRN
OUTPATIENT
Start: 2024-06-03

## 2024-05-16 RX ORDER — DIPHENHYDRAMINE HYDROCHLORIDE 50 MG/ML
50 INJECTION INTRAMUSCULAR; INTRAVENOUS AS NEEDED
OUTPATIENT
Start: 2024-05-20

## 2024-05-16 RX ORDER — HYDROXYCHLOROQUINE SULFATE 200 MG/1
200 TABLET, FILM COATED ORAL DAILY
Qty: 30 TABLET | Refills: 11 | Status: SHIPPED | OUTPATIENT
Start: 2024-05-16 | End: 2025-05-16

## 2024-05-16 RX ORDER — HEPARIN SODIUM,PORCINE/PF 10 UNIT/ML
50 SYRINGE (ML) INTRAVENOUS AS NEEDED
OUTPATIENT
Start: 2024-05-20

## 2024-05-16 RX ORDER — PREDNISONE 10 MG/1
10 TABLET ORAL DAILY
COMMUNITY

## 2024-05-16 RX ORDER — HEPARIN 100 UNIT/ML
500 SYRINGE INTRAVENOUS AS NEEDED
OUTPATIENT
Start: 2024-05-20

## 2024-05-16 RX ORDER — EPINEPHRINE 0.3 MG/.3ML
0.3 INJECTION SUBCUTANEOUS EVERY 5 MIN PRN
OUTPATIENT
Start: 2024-05-20

## 2024-05-16 RX ORDER — LEVOFLOXACIN 500 MG/1
500 TABLET, FILM COATED ORAL DAILY
COMMUNITY

## 2024-05-16 RX ORDER — CHOLECALCIFEROL (VITAMIN D3) 25 MCG
1000 TABLET ORAL DAILY
COMMUNITY

## 2024-05-16 RX ORDER — DIPHENHYDRAMINE HYDROCHLORIDE 50 MG/ML
50 INJECTION INTRAMUSCULAR; INTRAVENOUS AS NEEDED
OUTPATIENT
Start: 2024-06-03

## 2024-05-16 RX ORDER — COLCHICINE 0.6 MG/1
TABLET ORAL DAILY
COMMUNITY

## 2024-05-16 ASSESSMENT — PAIN SCALES - GENERAL: PAINLEVEL: 9

## 2024-05-16 NOTE — PROGRESS NOTES
Per Dr. Ward-  Apply for Benlysta infusion for treatment of lupus.  Patient is close to [place].     Benlysta 10 mg/kg x 113 kg = 1,130mg dose   Induction and maintenance dose

## 2024-05-16 NOTE — PROGRESS NOTES
Informants: Patient and EMR.  PP: 58-year-old female with history of fibromyalgia, osteoarthritis, gastroparesis, and lupus with positive RUSSELL.  CC: Diffuse musculoskeletal pain.  [place]: She has been having rather generalized musculoskeletal pain for the past several years.  She has been followed by rheumatology with Dr. Diego for several years.  She has been taking hydroxychloroquine.  She has been following with pain management and has been receiving prescriptions for Percocet.  She noted some improvement in the musculoskeletal pain while she was taking the hydroxychloroquine.  She notes some swelling in her hands and feet.  She has episodes of shortness of breath that tends to improve with taking prednisone.  She notes patchy hair loss on the front top of her scalp.  She notes a reddish discoloration of her face with sun exposure.  She also notes burning sensation on her arms with sun exposure.  She has not noted any Raynaud's symptoms.  She was recently evaluated by another rheumatologist who did not feel that she had lupus but has fibromyalgia.  She denies any prior history of blood clots.  She reports having 1 miscarriage of unclear etiology.  She has been off hydroxychloroquine for more than 1 year.  She was unable to tolerate Cymbalta or gabapentin due to abdominal pain.  PH: Allergies: Ciprofloxacin (gastrointestinal symptoms, azithromycin, (gastrointestinal symptoms, cephalexin (gastrointestinal symptoms, doxycycline (nausea vomiting diarrhea),ciprcinate (gastrointestinal symptoms, lamotrigine (swelling), fluticasone propion-salmeterol (sore throat/thrush), estradiol (rash/itching), nickel (rash), metformin (gastrointestinal symptoms), shellfish, chocolate (swelling), caffeine, beef; illnesses: Gastroparesis, irritable bowel syndrome, GERD, hypertension, type 2 diabetes mellitus, COPD, endometriosis, fibromyalgia, obstructive sleep apnea, significantly overweight, lupus (positive RUSSELL, photosensitivity,  arthralgia, facial rash); surgeries: Right total knee arthroplasty with 2 revision surgeries (, ), back surgery on 2 occasions (2015 with L4-S1 fusion), ventral hernia repair (10/9/2018) lumbar nerve block (2014) 3  sections, supracervical hysterectomy (), tonsillectomy, right knee arthroscopy, left total knee arthroplasty (2014).  SH: Tobacco: She smokes 1 pack of cigarettes per week.  She denies alcohol or illicit drug use.   1 miscarriage.  FH: Her father had hypertension, diabetes mellitus, myocardial infarction, hyperlipidemia, cancer.  Her mother had diabetes mellitus, hypertension, congestive heart failure, sarcoidosis.  She has a sister who is status post resection of a benign brain tumor, arthritis of the spine.  She has a son with asthma.  She has a daughter with POTS and another daughter who is healthy.  She has no brothers.  She has a maternal grandmother who had rheumatoid arthritis.  PX: She is a well-developed, well-nourished, significantly overweight black female.  The lungs are clear to auscultation.  The heart has a regular rate and rhythm with normal heart sounds.  Abdomen is protuberant but benign.  The extremities show trace pitting edema at the ankles, right more than left.  There is a well-healed surgical scar in the anterior aspect of both knees.  The musculoskeletal examination shows good passive range of motion of the upper extremity joints without joint effusion.  There is mild diffuse tenderness in the distal and proximal aspects of the upper extremities.  There is tenderness diffusely in the lower extremities.  The right knee is larger than the left knee without increased warmth.  Right upper quadrant ultrasound (2024) diffuse fatty liver, sludge in the gallbladder.  Chest CT scan (3/15/2024) small hiatus hernia.  X-ray cervical spine (2023) straightening of the cervical lordosis.  X-ray thoracic spine (2023) normal thoracic spine.   There is partially visualized lumbar stabilizing hardware.  X-ray knees (8/9/2023) bilateral total knee arthroplasty with possible early loosening of the femoral component of the right knee arthroplasty.  MRI lumbar spine (8/4/2023) postsurgical changes at L4-S1.  There is a grade 1 anterior listhesis of L5 on S1.  There is moderate multilevel degenerative arthritis changes.  There is a hemangioma in the L1 vertebral body.  2D echocardiogram (3/15/2024) left ventricular ejection fraction 50 to 55%, concentric left ventricular hypertrophy, trivial aortic regurgitation, trace mitral and tricuspid regurgitation.  There is physiologic pulmonic regurgitation.  Right ventricular systolic pressure 23.8 mmHg.  EGD (6/9/2023) normal except for erythematous mucosa of the antrum.  Laboratory (4/29/2024) ferritin 63, iron 68, TIBC 375, saturation 18%, WBC 6.6, hemoglobin 13.7, hematocrit 43.2, MCV 83, MCHC 31.7, platelets 320, (3/14/2024) hemoglobin A1c 6.8%, (12/11/2023) synovial fluid right knee: , RBC 19,000, neutrophils 31%, lymphocytes 56%, monocytes 12%, macrophages 1%, no crystals, (3/18/2023) RUSSELL 1: 80, EULALIA panel negative, RF <10, CCP <15, IgM cardiolipin 15.2.  Impression: 58-year-old black female with history of fibromyalgia, osteoarthritis, gastroparesis, arthralgia, positive RUSSELL, photosensitivity suggestive of systemic lupus erythematosus.  History of osteoarthritis of the lumbar spine and history of pseudogout in the right knee status post bilateral total knee arthroplasty.  She has been getting treated with prednisone for the arthritis symptoms as well as for the asthma.  She is currently taking Nucala infusions.  She has history of type 2 diabetes mellitus.  Plan: Apply to start Benlysta 10 mg/kg intravenously every 4 weeks for steroid sparing effect.  She is to resume hydroxychloroquine 200 mg daily.  She is to have laboratory for RUSSELL panel, cardiolipin antibody, mitochondrial antibody, smooth muscle  antibody, C3, and C4.      She is to do low impact aerobic exercise as tolerated for treatment of the fibromyalgia.  She is to return at the next available office appointment.

## 2024-05-17 ENCOUNTER — LAB (OUTPATIENT)
Dept: LAB | Facility: LAB | Age: 59
End: 2024-05-17
Payer: COMMERCIAL

## 2024-05-17 DIAGNOSIS — M32.9 SYSTEMIC LUPUS ERYTHEMATOSUS, UNSPECIFIED SLE TYPE, UNSPECIFIED ORGAN INVOLVEMENT STATUS (MULTI): ICD-10-CM

## 2024-05-17 DIAGNOSIS — I10 ESSENTIAL HYPERTENSION: Chronic | ICD-10-CM

## 2024-05-17 DIAGNOSIS — R10.13 EPIGASTRIC PAIN: ICD-10-CM

## 2024-05-17 DIAGNOSIS — I49.3 PVC (PREMATURE VENTRICULAR CONTRACTION): ICD-10-CM

## 2024-05-17 DIAGNOSIS — I50.9 CHRONIC HEART FAILURE, UNSPECIFIED HEART FAILURE TYPE (MULTI): ICD-10-CM

## 2024-05-17 DIAGNOSIS — R00.2 PALPITATIONS: ICD-10-CM

## 2024-05-17 DIAGNOSIS — E78.5 HYPERLIPIDEMIA, UNSPECIFIED HYPERLIPIDEMIA TYPE: ICD-10-CM

## 2024-05-17 DIAGNOSIS — I11.0 HYPERTENSIVE HEART DISEASE WITH CHRONIC DIASTOLIC CONGESTIVE HEART FAILURE (MULTI): ICD-10-CM

## 2024-05-17 DIAGNOSIS — R07.89 CHEST DISCOMFORT: ICD-10-CM

## 2024-05-17 DIAGNOSIS — R76.8 ANA POSITIVE: ICD-10-CM

## 2024-05-17 DIAGNOSIS — I50.32 HYPERTENSIVE HEART DISEASE WITH CHRONIC DIASTOLIC CONGESTIVE HEART FAILURE (MULTI): ICD-10-CM

## 2024-05-17 LAB
ALBUMIN SERPL BCP-MCNC: 4.3 G/DL (ref 3.4–5)
ALP SERPL-CCNC: 114 U/L (ref 33–110)
ALT SERPL W P-5'-P-CCNC: 10 U/L (ref 7–45)
ANION GAP SERPL CALC-SCNC: 12 MMOL/L (ref 10–20)
AST SERPL W P-5'-P-CCNC: 11 U/L (ref 9–39)
BASOPHILS # BLD AUTO: 0.03 X10*3/UL (ref 0–0.1)
BASOPHILS NFR BLD AUTO: 0.4 %
BILIRUB SERPL-MCNC: 0.3 MG/DL (ref 0–1.2)
BNP SERPL-MCNC: 71 PG/ML (ref 0–99)
BUN SERPL-MCNC: 9 MG/DL (ref 6–23)
C3 SERPL-MCNC: 174 MG/DL (ref 87–200)
C4 SERPL-MCNC: 73 MG/DL (ref 10–50)
CALCIUM SERPL-MCNC: 9 MG/DL (ref 8.6–10.3)
CARDIOLIPIN IGA SERPL-ACNC: 2.7 APL U/ML
CARDIOLIPIN IGG SER IA-ACNC: <1.6 GPL U/ML
CARDIOLIPIN IGM SER IA-ACNC: 3.9 MPL U/ML
CENTROMERE B AB SER-ACNC: <0.2 AI
CHLORIDE SERPL-SCNC: 107 MMOL/L (ref 98–107)
CHROMATIN AB SERPL-ACNC: <0.2 AI
CO2 SERPL-SCNC: 26 MMOL/L (ref 21–32)
CREAT SERPL-MCNC: 0.74 MG/DL (ref 0.5–1.05)
CRP SERPL-MCNC: 1.78 MG/DL
DSDNA AB SER-ACNC: <1 IU/ML
EGFRCR SERPLBLD CKD-EPI 2021: >90 ML/MIN/1.73M*2
ENA JO1 AB SER QL IA: <0.2 AI
ENA RNP AB SER IA-ACNC: 0.9 AI
ENA SCL70 AB SER QL IA: <0.2 AI
ENA SM AB SER IA-ACNC: <0.2 AI
ENA SM+RNP AB SER QL IA: <0.2 AI
ENA SS-A AB SER IA-ACNC: <0.2 AI
ENA SS-B AB SER IA-ACNC: <0.2 AI
EOSINOPHIL # BLD AUTO: 0 X10*3/UL (ref 0–0.7)
EOSINOPHIL NFR BLD AUTO: 0 %
ERYTHROCYTE [DISTWIDTH] IN BLOOD BY AUTOMATED COUNT: 16.3 % (ref 11.5–14.5)
GLUCOSE SERPL-MCNC: 196 MG/DL (ref 74–99)
HCT VFR BLD AUTO: 39.1 % (ref 36–46)
HGB BLD-MCNC: 12.4 G/DL (ref 12–16)
IMM GRANULOCYTES # BLD AUTO: 0.02 X10*3/UL (ref 0–0.7)
IMM GRANULOCYTES NFR BLD AUTO: 0.3 % (ref 0–0.9)
LYMPHOCYTES # BLD AUTO: 1.3 X10*3/UL (ref 1.2–4.8)
LYMPHOCYTES NFR BLD AUTO: 17.7 %
MCH RBC QN AUTO: 25.7 PG (ref 26–34)
MCHC RBC AUTO-ENTMCNC: 31.7 G/DL (ref 32–36)
MCV RBC AUTO: 81 FL (ref 80–100)
MONOCYTES # BLD AUTO: 0.14 X10*3/UL (ref 0.1–1)
MONOCYTES NFR BLD AUTO: 1.9 %
NEUTROPHILS # BLD AUTO: 5.85 X10*3/UL (ref 1.2–7.7)
NEUTROPHILS NFR BLD AUTO: 79.7 %
NRBC BLD-RTO: 0 /100 WBCS (ref 0–0)
PLATELET # BLD AUTO: 329 X10*3/UL (ref 150–450)
POTASSIUM SERPL-SCNC: 4.2 MMOL/L (ref 3.5–5.3)
PROT SERPL-MCNC: 6.7 G/DL (ref 6.4–8.2)
RBC # BLD AUTO: 4.82 X10*6/UL (ref 4–5.2)
RIBOSOMAL P AB SER-ACNC: <0.2 AI
SODIUM SERPL-SCNC: 141 MMOL/L (ref 136–145)
WBC # BLD AUTO: 7.3 X10*3/UL (ref 4.4–11.3)

## 2024-05-17 PROCEDURE — 36415 COLL VENOUS BLD VENIPUNCTURE: CPT

## 2024-05-17 PROCEDURE — 85025 COMPLETE CBC W/AUTO DIFF WBC: CPT

## 2024-05-17 PROCEDURE — 83880 ASSAY OF NATRIURETIC PEPTIDE: CPT

## 2024-05-17 PROCEDURE — 86235 NUCLEAR ANTIGEN ANTIBODY: CPT

## 2024-05-17 PROCEDURE — 86381 MITOCHONDRIAL ANTIBODY EACH: CPT

## 2024-05-17 PROCEDURE — 86160 COMPLEMENT ANTIGEN: CPT

## 2024-05-17 PROCEDURE — 86038 ANTINUCLEAR ANTIBODIES: CPT

## 2024-05-17 PROCEDURE — 86147 CARDIOLIPIN ANTIBODY EA IG: CPT

## 2024-05-17 PROCEDURE — 86140 C-REACTIVE PROTEIN: CPT

## 2024-05-17 PROCEDURE — 80053 COMPREHEN METABOLIC PANEL: CPT

## 2024-05-17 PROCEDURE — 86225 DNA ANTIBODY NATIVE: CPT

## 2024-05-20 ENCOUNTER — TELEPHONE (OUTPATIENT)
Dept: GASTROENTEROLOGY | Facility: HOSPITAL | Age: 59
End: 2024-05-20
Payer: COMMERCIAL

## 2024-05-21 RX ORDER — DIPHENHYDRAMINE HCL 25 MG
25 CAPSULE ORAL ONCE
OUTPATIENT
Start: 2024-06-03 | End: 2024-06-03

## 2024-05-21 RX ORDER — DIPHENHYDRAMINE HCL 25 MG
25 CAPSULE ORAL ONCE
OUTPATIENT
Start: 2024-05-21 | End: 2024-05-21

## 2024-05-21 RX ORDER — ACETAMINOPHEN 325 MG/1
650 TABLET ORAL ONCE
OUTPATIENT
Start: 2024-05-21 | End: 2024-05-21

## 2024-05-21 RX ORDER — ACETAMINOPHEN 325 MG/1
650 TABLET ORAL ONCE
OUTPATIENT
Start: 2024-06-03 | End: 2024-06-03

## 2024-05-21 NOTE — PROGRESS NOTES
Standard premeds added to Benlysta infusions: APAP 650mg PO, Benadryl 25mg PO, hydrocortisone 50mg IVP.

## 2024-05-23 RX ORDER — DEXLANSOPRAZOLE 60 MG/1
60 CAPSULE, DELAYED RELEASE ORAL DAILY
Qty: 30 CAPSULE | Refills: 5 | Status: SHIPPED | OUTPATIENT
Start: 2024-05-23 | End: 2024-11-19

## 2024-05-27 LAB
ANA SER QL HEP2 SUBST: NEGATIVE
CYTOPLASMIC PATTERN: PRESENT
MITOCHONDRIA AB SER QL IF: ABNORMAL

## 2024-06-05 ENCOUNTER — TELEPHONE (OUTPATIENT)
Dept: CARDIOLOGY | Facility: CLINIC | Age: 59
End: 2024-06-05
Payer: COMMERCIAL

## 2024-06-05 ENCOUNTER — HOSPITAL ENCOUNTER (EMERGENCY)
Facility: HOSPITAL | Age: 59
Discharge: HOME | End: 2024-06-05
Attending: EMERGENCY MEDICINE
Payer: COMMERCIAL

## 2024-06-05 ENCOUNTER — APPOINTMENT (OUTPATIENT)
Dept: RADIOLOGY | Facility: HOSPITAL | Age: 59
End: 2024-06-05
Payer: COMMERCIAL

## 2024-06-05 ENCOUNTER — HOSPITAL ENCOUNTER (OUTPATIENT)
Dept: CARDIOLOGY | Facility: HOSPITAL | Age: 59
Discharge: HOME | End: 2024-06-05
Payer: COMMERCIAL

## 2024-06-05 VITALS
BODY MASS INDEX: 47.84 KG/M2 | DIASTOLIC BLOOD PRESSURE: 85 MMHG | SYSTOLIC BLOOD PRESSURE: 172 MMHG | TEMPERATURE: 98.1 F | WEIGHT: 260 LBS | HEART RATE: 74 BPM | OXYGEN SATURATION: 99 % | HEIGHT: 62 IN | RESPIRATION RATE: 22 BRPM

## 2024-06-05 DIAGNOSIS — R07.89 CHEST WALL PAIN: ICD-10-CM

## 2024-06-05 DIAGNOSIS — R06.02 SHORTNESS OF BREATH: Primary | ICD-10-CM

## 2024-06-05 DIAGNOSIS — R10.32 LEFT LOWER QUADRANT ABDOMINAL PAIN: ICD-10-CM

## 2024-06-05 LAB
ALBUMIN SERPL BCP-MCNC: 4.1 G/DL (ref 3.4–5)
ALP SERPL-CCNC: 93 U/L (ref 33–110)
ALT SERPL W P-5'-P-CCNC: 9 U/L (ref 7–45)
ANION GAP SERPL CALC-SCNC: 10 MMOL/L (ref 10–20)
AST SERPL W P-5'-P-CCNC: 17 U/L (ref 9–39)
BASOPHILS # BLD AUTO: 0.04 X10*3/UL (ref 0–0.1)
BASOPHILS NFR BLD AUTO: 0.5 %
BILIRUB SERPL-MCNC: 0.2 MG/DL (ref 0–1.2)
BNP SERPL-MCNC: 92 PG/ML (ref 0–99)
BUN SERPL-MCNC: 5 MG/DL (ref 6–23)
CALCIUM SERPL-MCNC: 9.1 MG/DL (ref 8.6–10.3)
CARDIAC TROPONIN I PNL SERPL HS: 6 NG/L (ref 0–13)
CHLORIDE SERPL-SCNC: 107 MMOL/L (ref 98–107)
CO2 SERPL-SCNC: 28 MMOL/L (ref 21–32)
CREAT SERPL-MCNC: 0.66 MG/DL (ref 0.5–1.05)
EGFRCR SERPLBLD CKD-EPI 2021: >90 ML/MIN/1.73M*2
EOSINOPHIL # BLD AUTO: 0.06 X10*3/UL (ref 0–0.7)
EOSINOPHIL NFR BLD AUTO: 0.7 %
ERYTHROCYTE [DISTWIDTH] IN BLOOD BY AUTOMATED COUNT: 16.4 % (ref 11.5–14.5)
GLUCOSE SERPL-MCNC: 83 MG/DL (ref 74–99)
HCT VFR BLD AUTO: 40.6 % (ref 36–46)
HGB BLD-MCNC: 12.4 G/DL (ref 12–16)
IMM GRANULOCYTES # BLD AUTO: 0.02 X10*3/UL (ref 0–0.7)
IMM GRANULOCYTES NFR BLD AUTO: 0.2 % (ref 0–0.9)
INR PPP: 1.1 (ref 0.9–1.1)
LYMPHOCYTES # BLD AUTO: 4.1 X10*3/UL (ref 1.2–4.8)
LYMPHOCYTES NFR BLD AUTO: 47.9 %
MAGNESIUM SERPL-MCNC: 2.25 MG/DL (ref 1.6–2.4)
MCH RBC QN AUTO: 25.5 PG (ref 26–34)
MCHC RBC AUTO-ENTMCNC: 30.5 G/DL (ref 32–36)
MCV RBC AUTO: 84 FL (ref 80–100)
MONOCYTES # BLD AUTO: 0.62 X10*3/UL (ref 0.1–1)
MONOCYTES NFR BLD AUTO: 7.2 %
NEUTROPHILS # BLD AUTO: 3.72 X10*3/UL (ref 1.2–7.7)
NEUTROPHILS NFR BLD AUTO: 43.5 %
NRBC BLD-RTO: 0 /100 WBCS (ref 0–0)
PLATELET # BLD AUTO: 342 X10*3/UL (ref 150–450)
POTASSIUM SERPL-SCNC: 4.2 MMOL/L (ref 3.5–5.3)
PROT SERPL-MCNC: 7.3 G/DL (ref 6.4–8.2)
PROTHROMBIN TIME: 12.2 SECONDS (ref 9.8–12.8)
RBC # BLD AUTO: 4.86 X10*6/UL (ref 4–5.2)
SODIUM SERPL-SCNC: 141 MMOL/L (ref 136–145)
WBC # BLD AUTO: 8.6 X10*3/UL (ref 4.4–11.3)

## 2024-06-05 PROCEDURE — 96375 TX/PRO/DX INJ NEW DRUG ADDON: CPT

## 2024-06-05 PROCEDURE — 85610 PROTHROMBIN TIME: CPT | Performed by: NURSE PRACTITIONER

## 2024-06-05 PROCEDURE — 84484 ASSAY OF TROPONIN QUANT: CPT | Performed by: NURSE PRACTITIONER

## 2024-06-05 PROCEDURE — 2550000001 HC RX 255 CONTRASTS: Performed by: EMERGENCY MEDICINE

## 2024-06-05 PROCEDURE — 83880 ASSAY OF NATRIURETIC PEPTIDE: CPT | Performed by: NURSE PRACTITIONER

## 2024-06-05 PROCEDURE — 71046 X-RAY EXAM CHEST 2 VIEWS: CPT | Mod: FOREIGN READ | Performed by: RADIOLOGY

## 2024-06-05 PROCEDURE — 2500000002 HC RX 250 W HCPCS SELF ADMINISTERED DRUGS (ALT 637 FOR MEDICARE OP, ALT 636 FOR OP/ED)

## 2024-06-05 PROCEDURE — 74177 CT ABD & PELVIS W/CONTRAST: CPT

## 2024-06-05 PROCEDURE — 94640 AIRWAY INHALATION TREATMENT: CPT

## 2024-06-05 PROCEDURE — 71046 X-RAY EXAM CHEST 2 VIEWS: CPT

## 2024-06-05 PROCEDURE — 2500000004 HC RX 250 GENERAL PHARMACY W/ HCPCS (ALT 636 FOR OP/ED)

## 2024-06-05 PROCEDURE — 80053 COMPREHEN METABOLIC PANEL: CPT | Performed by: NURSE PRACTITIONER

## 2024-06-05 PROCEDURE — 93005 ELECTROCARDIOGRAM TRACING: CPT

## 2024-06-05 PROCEDURE — 85025 COMPLETE CBC W/AUTO DIFF WBC: CPT | Performed by: NURSE PRACTITIONER

## 2024-06-05 PROCEDURE — 99285 EMERGENCY DEPT VISIT HI MDM: CPT | Mod: 25

## 2024-06-05 PROCEDURE — 2500000004 HC RX 250 GENERAL PHARMACY W/ HCPCS (ALT 636 FOR OP/ED): Performed by: EMERGENCY MEDICINE

## 2024-06-05 PROCEDURE — 2500000001 HC RX 250 WO HCPCS SELF ADMINISTERED DRUGS (ALT 637 FOR MEDICARE OP)

## 2024-06-05 PROCEDURE — 74177 CT ABD & PELVIS W/CONTRAST: CPT | Mod: FOREIGN READ | Performed by: RADIOLOGY

## 2024-06-05 PROCEDURE — 83735 ASSAY OF MAGNESIUM: CPT | Performed by: NURSE PRACTITIONER

## 2024-06-05 PROCEDURE — 36415 COLL VENOUS BLD VENIPUNCTURE: CPT | Performed by: NURSE PRACTITIONER

## 2024-06-05 PROCEDURE — 96374 THER/PROPH/DIAG INJ IV PUSH: CPT | Mod: 59

## 2024-06-05 RX ORDER — KETOROLAC TROMETHAMINE 30 MG/ML
15 INJECTION, SOLUTION INTRAMUSCULAR; INTRAVENOUS ONCE
Status: COMPLETED | OUTPATIENT
Start: 2024-06-05 | End: 2024-06-05

## 2024-06-05 RX ORDER — CARVEDILOL 25 MG/1
25 TABLET ORAL ONCE
Status: COMPLETED | OUTPATIENT
Start: 2024-06-05 | End: 2024-06-05

## 2024-06-05 RX ORDER — LIDOCAINE 560 MG/1
1 PATCH PERCUTANEOUS; TOPICAL; TRANSDERMAL DAILY
Status: DISCONTINUED | OUTPATIENT
Start: 2024-06-05 | End: 2024-06-05 | Stop reason: HOSPADM

## 2024-06-05 RX ORDER — CYCLOBENZAPRINE HCL 10 MG
5 TABLET ORAL ONCE
Status: DISCONTINUED | OUTPATIENT
Start: 2024-06-05 | End: 2024-06-05 | Stop reason: HOSPADM

## 2024-06-05 RX ORDER — ACETAMINOPHEN 325 MG/1
975 TABLET ORAL ONCE
Status: DISCONTINUED | OUTPATIENT
Start: 2024-06-05 | End: 2024-06-05 | Stop reason: HOSPADM

## 2024-06-05 RX ORDER — IPRATROPIUM BROMIDE AND ALBUTEROL SULFATE 2.5; .5 MG/3ML; MG/3ML
3 SOLUTION RESPIRATORY (INHALATION) EVERY 20 MIN
Status: COMPLETED | OUTPATIENT
Start: 2024-06-05 | End: 2024-06-05

## 2024-06-05 RX ORDER — FUROSEMIDE 10 MG/ML
60 INJECTION INTRAMUSCULAR; INTRAVENOUS ONCE
Status: COMPLETED | OUTPATIENT
Start: 2024-06-05 | End: 2024-06-05

## 2024-06-05 RX ORDER — MORPHINE SULFATE 4 MG/ML
4 INJECTION, SOLUTION INTRAMUSCULAR; INTRAVENOUS ONCE
Status: DISCONTINUED | OUTPATIENT
Start: 2024-06-05 | End: 2024-06-05 | Stop reason: HOSPADM

## 2024-06-05 RX ADMIN — IPRATROPIUM BROMIDE AND ALBUTEROL SULFATE 3 ML: .5; 3 SOLUTION RESPIRATORY (INHALATION) at 16:14

## 2024-06-05 RX ADMIN — IOHEXOL 85 ML: 350 INJECTION, SOLUTION INTRAVENOUS at 17:09

## 2024-06-05 RX ADMIN — CARVEDILOL 25 MG: 25 TABLET, FILM COATED ORAL at 16:24

## 2024-06-05 RX ADMIN — IPRATROPIUM BROMIDE AND ALBUTEROL SULFATE 3 ML: .5; 3 SOLUTION RESPIRATORY (INHALATION) at 16:15

## 2024-06-05 RX ADMIN — KETOROLAC TROMETHAMINE 15 MG: 30 INJECTION, SOLUTION INTRAMUSCULAR; INTRAVENOUS at 17:15

## 2024-06-05 RX ADMIN — FUROSEMIDE 60 MG: 10 INJECTION, SOLUTION INTRAMUSCULAR; INTRAVENOUS at 16:10

## 2024-06-05 RX ADMIN — IPRATROPIUM BROMIDE AND ALBUTEROL SULFATE 3 ML: .5; 3 SOLUTION RESPIRATORY (INHALATION) at 16:11

## 2024-06-05 ASSESSMENT — COLUMBIA-SUICIDE SEVERITY RATING SCALE - C-SSRS
2. HAVE YOU ACTUALLY HAD ANY THOUGHTS OF KILLING YOURSELF?: NO
6. HAVE YOU EVER DONE ANYTHING, STARTED TO DO ANYTHING, OR PREPARED TO DO ANYTHING TO END YOUR LIFE?: NO
1. IN THE PAST MONTH, HAVE YOU WISHED YOU WERE DEAD OR WISHED YOU COULD GO TO SLEEP AND NOT WAKE UP?: NO

## 2024-06-05 ASSESSMENT — PAIN SCALES - GENERAL: PAINLEVEL_OUTOF10: 9

## 2024-06-05 ASSESSMENT — PAIN - FUNCTIONAL ASSESSMENT: PAIN_FUNCTIONAL_ASSESSMENT: 0-10

## 2024-06-05 NOTE — ED TRIAGE NOTES
Pt to  ER via triage,  c/o CP and SOB x 2 days. Pt states she's very fatigued and sweaty walking even short distances.

## 2024-06-05 NOTE — DISCHARGE INSTRUCTIONS
Please return to the emergency department, should you have any worsening symptoms, are unable to get an appointment with your primary care physician within the discussed time frame, or have any further concerns.     Please follow up with:   Cardiology tomorrow morning.  Please call for an appointment.    You may take ibuprofen or tylenol for pain. You may alternate ibuprofen and tylenol every 6 hours for better pain control.    Do not exceed 2400mg of ibuprofen or 4000mg of tylenol in a 24 hour period.

## 2024-06-05 NOTE — TELEPHONE ENCOUNTER
Patient called stating she feels like she has an elephant sitting on her chest, and is experiencing shortness of breath, dizziness, and is sweaty. Pt asking to be seen in office today. Per Dr. Browning: ER for chest pain work up. Pt aware and verbalized understanding.

## 2024-06-05 NOTE — ED TRIAGE NOTES
TRIAGE NOTE   I saw the patient as the Clinician in Triage and performed a brief history and physical exam, established acuity, and ordered appropriate tests to develop basic plan of care. Patient will be seen by an [facility], resident and/or physician who will independently evaluate the patient. Please see subsequent provider notes for further details and disposition.     Brief HPI: In brief, Josemanuel Reinoso is a 58 y.o. female significant past medical history for HTN, HLD and DM presenting to ED today from home by herself for evaluation of chest pain and shortness of breath.  2 days ago the patient developed central to left-sided chest pain that is currently rated 9/10.  Also endorses shortness of breath that is exacerbated with walking.  Patient has had lower extremity edema recently and complains of fatigue.  Denies any weight gain, unsure if she has orthopnea.  No history of PE/DVT, recent travel, recent surgery, history malignancy or use of exogenous hormones.  Denies fever/chills, cough/cold symptoms, nausea/vomiting, abdominal pain, urinary symptoms, change in bowel habits or any other complaints.  No smoking, EtOH or drug use.  PCP is Dr. Reinoso cardiologist is Dr. Browning.    Focused Physical exam:   General: 58-year-old -American female, ambulating with a cane, sitting in a wheelchair.  Appears tired and fatigued but nontoxic.  Awake and alert, oriented x 3.  Well-nourished and hydrated.  Skin: Pink warm and dry.  Cardiac: Regular rate and rhythm.  Pulmonary: Lungs clear bilaterally.  Abdomen: Obese and soft with bowel sounds, nontender.    Plan/MDM:   58-year-old -American female with significant past medical history for HTN, HLD and DM is evaluated the bedside for 2 days of central left-sided chest pain with shortness of breath, fatigue and lower extremity edema.  On arrival to the ED, blood pressure 180/81, patient is not tachycardic hypoxic or febrile.  IV established, basic labs, EKG and  chest x-ray will be performed in preparation for further evaluation in the main ED.  Patient is agreeable to this plan.    Please see subsequent provider note for further details and disposition

## 2024-06-05 NOTE — ED PROVIDER NOTES
HPI:      Limitations to History: None  Additional History Obtained from: N/A  External records reviewed: Prior EMR notes    Chief Complaint   Patient presents with    Shortness of Breath    Chest Pain          Josemanuel Reinoso is a 58 y.o. female with past medical history of T2DM, COPD/asthma, HFpEF, fibromyalgia, HLD, IDRIS, and tobacco use presenting to the ED with multiple complaints.  She notes continued she had a mechanical pain, that has  now progressed to left-sided chest pain,it has been present since 2 days ago.  Patient notes that she thought that maybe her COPD/asthma was acting up, and attempted an albuterol inhaler, and 1 DuoNeb with minimal improvement.  Notes that she had difficulty sleeping last night due to inability to get comfortable and presented today for further evaluation.  Patient states that she saw stars areas today prior to coming in, notes that she forgot to take her blood pressure meds prior to arriving in the emergency department.      Past Medical History:   Diagnosis Date    Personal history of other diseases of the circulatory system     History of hypertension    Personal history of other endocrine, nutritional and metabolic disease     History of diabetes mellitus     Past Surgical History:   Procedure Laterality Date    MR HEAD ANGIO WO IV CONTRAST  7/14/2019    MR HEAD ANGIO WO IV CONTRAST [facility]    MR HEAD ANGIO WO IV CONTRAST  7/14/2019    MR HEAD ANGIO WO IV CONTRAST [facility]    MR NECK ANGIO WO IV CONTRAST  7/14/2019    MR NECK ANGIO WO IV CONTRAST [facility]     Social History     Tobacco Use    Smoking status: Some Days     Types: Cigarettes     Passive exposure: Never    Smokeless tobacco: Never   Vaping Use    Vaping status: Never Used   Substance Use Topics    Alcohol use: Never    Drug use: Never     Allergies   Allergen Reactions    Azithromycin GI Upset     c/o severe stomach burning    Doxycycline Diarrhea and Nausea/vomiting     Erythromycin GI Upset and Nausea/vomiting    Fluticasone Propion-Salmeterol Other     Sore throat / thrush     Ibuprofen GI Upset    Lamotrigine Swelling     Had bilateral swelling of feet and lower legs. The swelling subsided and is now gone 2 days after she stopped Lamictal    Metformin GI Upset    Penicillins Hives and Rash    Pregabalin Hives    Estradiol Itching and Rash    Nickel Rash     No current facility-administered medications on file prior to encounter.     Current Outpatient Medications on File Prior to Encounter   Medication Sig    albuterol 2.5 mg /3 mL (0.083 %) nebulizer solution Take 3 mL (2.5 mg) by nebulization every 6 hours if needed.    albuterol 90 mcg/actuation inhaler inhale 1 to 2 puffs by mouth every 4 hours Inhalation    aluminum hydrox-magnesium carb (Gaviscon)  mg/15 mL suspension oral suspension Take 16 mL by mouth every 6 hours if needed for indigestion or heartburn.    bisacodyl (Dulcolax) 5 mg EC tablet take 1 tablet by mouth once daily if needed for constipation    carvedilol (Coreg) 25 mg tablet Take 1 tablet (25 mg) by mouth 2 times a day with meals.    cetirizine (ZyrTEC) 10 mg tablet Take 1 tablet (10 mg) by mouth once daily.    chlorthalidone (Hygroton) 25 mg tablet Take 1 tablet (25 mg) by mouth once daily.    cholecalciferol (Vitamin D3) 25 MCG (1000 UT) tablet Take 1 tablet (1,000 Units) by mouth once daily.    colchicine 0.6 mg tablet Take by mouth once daily.    cyanocobalamin (Vitamin B-12) 1,000 mcg tablet Take 1 tablet (1,000 mcg) by mouth once daily.    dexlansoprazole (Dexilant) 60 mg DR capsule TAKE 1 CAPSULE (60 MG) BY MOUTH ONCE DAILY. DO NOT CRUSH OR CHEW.    docusate sodium (Colace) 100 mg capsule Take 1 capsule (100 mg) by mouth 2 times a day as needed for constipation.    empagliflozin (Jardiance) 10 mg Take 1 tablet (10 mg) by mouth once daily with breakfast.    famotidine (Pepcid) 40 mg tablet Take 1 tablet (40 mg) by mouth once daily at bedtime.     fluticasone (Flonase) 50 mcg/actuation nasal spray 1 spray by Does not apply route 2 times a day as needed.    fluticasone-umeclidin-vilanter (Trelegy Ellipta) 200-62.5-25 mcg blister with device Inhale 1 puff once daily.    furosemide (Lasix) 20 mg tablet Take 1 tablet (20 mg) by mouth once daily as needed (SWELLING AND SOB).    hydroxychloroquine (Plaquenil) 200 mg tablet Take 1 tablet (200 mg) by mouth once daily.    hyoscyamine 0.125 mg disintegrating tablet DISSOLVE 1 TABLET UNDER THE TONGUE EVERY 4 HOURS AS NEEDED    ipratropium-albuteroL (Duo-Neb) 0.5-2.5 mg/3 mL nebulizer solution Inhale 3 mL every 6 hours if needed.    levoFLOXacin (Levaquin) 500 mg tablet Take 1 tablet (500 mg) by mouth once daily.    lidocaine (Lidoderm) 5 % patch Place 1 patch on the skin once daily as needed.    melatonin 5 mg tablet Take 1 tablet (5 mg) by mouth once daily as needed.    meloxicam (Mobic) 15 mg tablet Take 1 tablet (15 mg) by mouth once daily.    metoclopramide (Reglan) 5 mg tablet Take 1 tablet (5 mg) by mouth once daily.    miconazole (Micotin) 2 % cream Apply 1 Application topically if needed. To abdomen    montelukast (Singulair) 10 mg tablet Take 1 tablet (10 mg) by mouth once daily as needed.    ondansetron (Zofran) 4 mg tablet Take 1 tablet (4 mg) by mouth every 6 hours if needed.    oxybutynin XL (Ditropan-XL) 10 mg 24 hr tablet Take 1 tablet (10 mg) by mouth once daily.    oxyCODONE-acetaminophen (Percocet) 7.5-325 mg tablet Take 1 tablet by mouth every 6 hours if needed.    pantoprazole (ProtoNix) 40 mg EC tablet Take 1 tablet (40 mg) by mouth once daily. Do not crush, chew, or split.    predniSONE (Deltasone) 10 mg tablet Take 1 tablet (10 mg) by mouth once daily.    Pulmicort Flexhaler 90 mcg/actuation inhaler Inhale 1-2 puffs 2 times a day.    rOPINIRole (Requip) 0.5 mg tablet Take 1 tablet (0.5 mg) by mouth once daily at bedtime.    rosuvastatin (Crestor) 20 mg tablet Take 1 tablet (20 mg) by mouth once  daily.      --------------------------------------------------------------------------------------------------------------------------------------    VS: As documented in the triage note and EMR flowsheet from this visit were reviewed.  Temp 36.7 °C (98.1 °F) HR 73 /81 RR 18 Sat 96 % on      Physical Exam:  GEN:  no acute distress, appears comfortable. Conversational and appropriate.    HEENT: Normocephalic, atraumatic. Conjunctiva pink with no redness or exudates. Hearing grossly intact. Moist mucous membranes.  CARDIO: Normal rate and regular rhythm. Normal S1, S2  without murmurs, rubs, or gallops.  Tenderness to palpation over the chest wall, worse on the left than right.  PULM: Clear to auscultation bilaterally. No rales, rhonchi, or wheezes. No accessory muscle use or stridor. Speaking in full sentences.  GI: Soft, tenderness to palpation of the left upper quadrant and left lower quadrant, non-distended. No rebound tenderness or guarding.   SKIN: Warm and dry, no rashes, lesions, petechiae, or purpura.  MSK: ROM intact in all 4 extremities without contractures or pain.  2+ nonpitting edema of the bilateral lower extremities, contusions, or wounds.    NEURO: A&Ox3, No focal findings identified. No confusion or gross mental status changes.  ---------------------------------------------------------------------------------------------------------------------------------------  Given in the ED:   Medications   acetaminophen (Tylenol) tablet 975 mg (975 mg oral Not Given 6/5/24 1630)   lidocaine 4 % patch 1 patch (1 patch transdermal Not Given 6/5/24 1630)   cyclobenzaprine (Flexeril) tablet 5 mg (5 mg oral Not Given 6/5/24 1630)   morphine injection 4 mg (4 mg intravenous Not Given 6/5/24 1630)   carvedilol (Coreg) tablet 25 mg (25 mg oral Given 6/5/24 1624)   furosemide (Lasix) injection 60 mg (60 mg intravenous Given 6/5/24 1610)   ipratropium-albuteroL (Duo-Neb) 0.5-2.5 mg/3 mL nebulizer solution 3 mL (3  mL nebulization Given 6/5/24 1615)   iohexol (OMNIPaque) 350 mg iodine/mL solution 85 mL (85 mL intravenous Given 6/5/24 1709)   ketorolac (Toradol) injection 15 mg (15 mg intravenous Given 6/5/24 1715)     ED Medication Administration from 06/05/2024 1359 to 06/05/2024 1911         Date/Time Order Dose Route Action Action by     06/05/2024 1610 EDT furosemide (Lasix) injection 60 mg 60 mg intravenous Given GAMALIEL Iglesias     06/05/2024 1611 EDT ipratropium-albuteroL (Duo-Neb) 0.5-2.5 mg/3 mL nebulizer solution 3 mL 3 mL nebulization Given Vasyl, P     06/05/2024 1614 EDT ipratropium-albuteroL (Duo-Neb) 0.5-2.5 mg/3 mL nebulizer solution 3 mL 3 mL nebulization Given Vasyl, P     06/05/2024 1615 EDT ipratropium-albuteroL (Duo-Neb) 0.5-2.5 mg/3 mL nebulizer solution 3 mL 3 mL nebulization Given Vasyl, P     06/05/2024 1624 EDT carvedilol (Coreg) tablet 25 mg 25 mg oral Given GAMALIEL Iglesias     06/05/2024 1630 EDT acetaminophen (Tylenol) tablet 975 mg 975 mg oral Not Given GAMALIEL Iglesias     06/05/2024 1630 EDT cyclobenzaprine (Flexeril) tablet 5 mg 5 mg oral Not Given GAMALIEL Iglesias     06/05/2024 1630 EDT lidocaine 4 % patch 1 patch 1 patch transdermal Not Given GAMALIEL Iglesias     06/05/2024 1630 EDT morphine injection 4 mg 4 mg intravenous Not Given GAMALIEL Iglesias     06/05/2024 1709 EDT iohexol (OMNIPaque) 350 mg iodine/mL solution 85 mL 85 mL intravenous Given WILBERTO Graves     06/05/2024 1715 EDT ketorolac (Toradol) injection 15 mg 15 mg intravenous Given GAMALIEL Iglesias              Work up: All labs and imaging were independently reviewed by me.    Labs Reviewed   CBC WITH AUTO DIFFERENTIAL - Abnormal       Result Value    WBC 8.6      nRBC 0.0      RBC 4.86      Hemoglobin 12.4      Hematocrit 40.6      MCV 84      MCH 25.5 (*)     MCHC 30.5 (*)     RDW 16.4 (*)     Platelets 342      Neutrophils % 43.5      Immature Granulocytes %, Automated 0.2      Lymphocytes % 47.9      Monocytes % 7.2      Eosinophils % 0.7       Basophils % 0.5      Neutrophils Absolute 3.72      Immature Granulocytes Absolute, Automated 0.02      Lymphocytes Absolute 4.10      Monocytes Absolute 0.62      Eosinophils Absolute 0.06      Basophils Absolute 0.04     COMPREHENSIVE METABOLIC PANEL - Abnormal    Glucose 83      Sodium 141      Potassium 4.2      Chloride 107      Bicarbonate 28      Anion Gap 10      Urea Nitrogen 5 (*)     Creatinine 0.66      eGFR >90      Calcium 9.1      Albumin 4.1      Alkaline Phosphatase 93      Total Protein 7.3      AST 17      Bilirubin, Total 0.2      ALT 9     MAGNESIUM - Normal    Magnesium 2.25     PROTIME-INR - Normal    Protime 12.2      INR 1.1     B-TYPE NATRIURETIC PEPTIDE - Normal    BNP 92      Narrative:        <100 pg/mL - Heart failure unlikely                  100-299 pg/mL - Intermediate probability of acute heart                                  failure exacerbation. Correlate with clinical                                  context and patient history.                    >=300 pg/mL - Heart Failure likely. Correlate with clinical                                  context and patient history.                                    BNP testing is performed using different testing methodology at [facility] than at other [facility]. Direct result comparisons should only be made within the same method.                      TROPONIN I, HIGH SENSITIVITY - Normal    Troponin I, High Sensitivity 6      Narrative:     Less than 99th percentile of normal range cutoff-                  Female and children under 18 years old <14 ng/L; Male <21 ng/L: Negative                  Repeat testing should be performed if clinically indicated.                                     Female and children under 18 years old 14-50 ng/L; Male 21-50 ng/L:                  Consistent with possible cardiac damage and possible increased clinical                   risk. Serial measurements may help to assess extent of  myocardial damage.                                     >50 ng/L: Consistent with cardiac damage, increased clinical risk and                  myocardial infarction. Serial measurements may help assess extent of                   myocardial damage.                                      NOTE: Children less than 1 year old may have higher baseline troponin                   levels and results should be interpreted in conjunction with the overall                   clinical context.                                     NOTE: Troponin I testing is performed using a different                   testing methodology at [facility] than at other                   [facility]. Direct result comparisons should only                   be made within the same method.   TROPONIN I, HIGH SENSITIVITY   POCT GLUCOSE METER       CT abdomen pelvis w IV contrast   Final Result   1.No acute findings.  No evidence of bowel obstruction or focal   inflammatory changes.   2.Small/borderline borderline sized upper abdominal, periaortic,   and pelvic lymph nodes are present without definite change from prior.   3.Sclerosis in the right femoral head suggestive of AVN without   collapse.   4.Postoperative changes in the lumbar spine.   Signed by Morris Carolina MD      XR chest 2 views   Final Result   No acute pulmonary pathology.   Signed by Micha Ellison MD      Point of Care Ultrasound    (Results Pending)         MDM:  Briefly, this is a 58 y.o. female who was seen here for shortness of breath, with an entirely unremarkable workup.  I do believe that her symptoms are likely related to her COPD, has not been taking her DuoNebs at home.  Low suspicion for pneumonia given lack of findings on x-ray, and no leukocytosis.  Point-of-care ultrasound of her heart did not show any right heart strain, normal outflow without any pericardial or pleural effusions noted.  Given her abdominal pain, CT abdomen pelvis with contrast was  performed did not show any acute findings, aside from degenerative changes of the hip suggestive of AVN and reactive lymph nodes.  Discussed these findings with patient, noted that her pain improved.  Does note some shortness of breath but improved.low suspicion for PE given that she is on room air, not tachycardic, and point-of-care ultrasound without any findings suggestive of right heart strain.  Discussed discharge and follow-up with cardiologist, which she was agreeable to.  Notes that her cardiologist notes that she is here.  Discharged in hemodynamically stable condition.      ED Course as of 06/05/24 1911 Wed Jun 05, 2024   1515 EKG today normal sinus rhythm 71 bpm, no ST elevation, normal intervals, normal axis, overall nonischemic EKG, similar to prior EKG performed on 3/14/2024. [AD]   1516 XR chest 2 views  Similar to prior, overall unremarkable. [AD]   1730 Troponin I, High Sensitivity  Within normal limits, repeat sent [AD]   1907 CT abdomen pelvis w IV contrast  Without any acute findings suggestive of infectious [AD]   1909 IMPRESSION:  1.No acute findings.  No evidence of bowel obstruction or focal  inflammatory changes.  2.Small/borderline borderline sized upper abdominal, periaortic,  and pelvic lymph nodes are present without definite change from prior.  3.Sclerosis in the right femoral head suggestive of AVN without collapse.  4.Postoperative changes in the lumbar spine. [AD]      ED Course User Index  [AD] Sera Lu DO         Diagnoses as of 06/05/24 1911   Shortness of breath   Left lower quadrant abdominal pain   Chest wall pain       Social Determinants of Health: None identified.    Discharge Medications: None  Plan and Disposition: Discharge home, advised to return if worse. They understand return precautions and discharge instructions. Patient was in agreement with this plan.    Sera Lu DO  Emergency Medicine, PGY-2    Patient was seen and evaluated by the attending  physician. The attending ED physician agrees with the plan. Patient and/or patient´s representative was counseled regarding labs, imaging, likely diagnosis, and plan. All questions were answered.  Disclaimer: This note was dictated by speech recognition.  Attempt at proofreading was made to minimize errors.  Errors in transcription may be present.  Please call if questions.     Sera Ramirez,   Resident  06/05/24 [id number]

## 2024-06-06 LAB
ATRIAL RATE: 71 BPM
P AXIS: 31 DEGREES
PR INTERVAL: 178 MS
Q ONSET: 249 MS
QRS COUNT: 11 BEATS
QRS DURATION: 95 MS
QT INTERVAL: 402 MS
QTC CALCULATION(BAZETT): 437 MS
QTC FREDERICIA: 425 MS
R AXIS: 7 DEGREES
T AXIS: -3 DEGREES
T OFFSET: 450 MS
VENTRICULAR RATE: 71 BPM

## 2024-06-07 ENCOUNTER — TELEPHONE (OUTPATIENT)
Dept: CARDIOLOGY | Facility: CLINIC | Age: 59
End: 2024-06-07
Payer: COMMERCIAL

## 2024-06-07 NOTE — TELEPHONE ENCOUNTER
Pt states she is getting surgery next month and ortho doctor is requiring cardiac clearance. Appt scheduled for ER discharge & cardiac clearance on 6/12/24. Pt aware. Pt states she is following up with her PCP on Monday 6/10/24 regarding SOB.

## 2024-06-07 NOTE — TELEPHONE ENCOUNTER
Answering service message from 6/6/24  @ 4:12pm  Would like an emergency APPT , about to have surgery soon and  Wants her heart checked, In ER last night and still short of breath

## 2024-06-10 ENCOUNTER — TELEPHONE (OUTPATIENT)
Dept: RHEUMATOLOGY | Facility: CLINIC | Age: 59
End: 2024-06-10
Payer: COMMERCIAL

## 2024-06-10 DIAGNOSIS — M32.9 LUPUS (MULTI): Primary | ICD-10-CM

## 2024-06-10 RX ORDER — MYCOPHENOLATE MOFETIL 250 MG/1
CAPSULE ORAL
Qty: 12 CAPSULE | Refills: 6 | Status: SHIPPED | OUTPATIENT
Start: 2024-06-10

## 2024-06-10 NOTE — TELEPHONE ENCOUNTER
----- Message from Flaquita Gallego PharmD sent at 6/10/2024  2:47 PM EDT -----  Regarding: RE: New Rx  Thank you!!  ----- Message -----  From: Gerri Zepeda, RN  Sent: 6/10/2024   2:33 PM EDT  To: Flaquita Gallego PharmD; Lydia Mary RN; #  Subject: FW: New Rx                                       Thank you Flaquita.  Left detailed VM directly with directions. Also, I'll send her a mychart message too with this change.   ----- Message -----  From: Flaquita Gallego PharmDAVON  Sent: 6/10/2024   1:42 PM EDT  To: [facility] [id number] Clinical Support Staff  Subject: New Rx                                           Hi,  Benlysta was denied for this patient because they wanted her to try methotrexate, azathioprine, mycophenolate, or cyclophosphamide first.  Dr. Ward reviewed and thought mycophenolate would be the best option to try first. He entered standing labs and sent a mycophenolate rx to her retail pharmacy.   Would you be able to let her know? Once she tries this for about 12 weeks we can try to submit Benlysta again if it isn't helping.  Thank you!  Flaquita   ----- Message -----  From: Miguel Ward MD  Sent: 5/16/2024   1:26 PM EDT  To: Rani Carson, PharmD; Flaquita Gallego, PharmD; #    Apply for Benlysta infusion for treatment of lupus.  [facility] [place] to [facility].

## 2024-06-12 ENCOUNTER — APPOINTMENT (OUTPATIENT)
Dept: GASTROENTEROLOGY | Facility: CLINIC | Age: 59
End: 2024-06-12
Payer: COMMERCIAL

## 2024-06-13 ENCOUNTER — APPOINTMENT (OUTPATIENT)
Dept: CARDIOLOGY | Facility: CLINIC | Age: 59
End: 2024-06-13
Payer: COMMERCIAL

## 2024-06-13 VITALS — DIASTOLIC BLOOD PRESSURE: 82 MMHG | OXYGEN SATURATION: 94 % | SYSTOLIC BLOOD PRESSURE: 134 MMHG | HEART RATE: 73 BPM

## 2024-06-13 DIAGNOSIS — I10 ESSENTIAL HYPERTENSION: Chronic | ICD-10-CM

## 2024-06-13 DIAGNOSIS — I47.10 NONSUSTAINED PAROXYSMAL SUPRAVENTRICULAR TACHYCARDIA (CMS-HCC): Primary | ICD-10-CM

## 2024-06-13 DIAGNOSIS — Z01.810 PREOP CARDIOVASCULAR EXAM: ICD-10-CM

## 2024-06-13 DIAGNOSIS — R07.89 CHEST DISCOMFORT: ICD-10-CM

## 2024-06-13 DIAGNOSIS — R00.2 PALPITATIONS: ICD-10-CM

## 2024-06-13 PROBLEM — L60.8 DISCOLORATION OF NAIL: Status: ACTIVE | Noted: 2024-05-01

## 2024-06-13 PROBLEM — B35.1 DERMATOPHYTOSIS OF NAIL: Status: ACTIVE | Noted: 2024-05-01

## 2024-06-13 PROBLEM — M79.674 PAIN IN TOES OF BOTH FEET: Status: ACTIVE | Noted: 2024-05-01

## 2024-06-13 PROBLEM — M76.821 POSTERIOR TIBIAL TENDON DYSFUNCTION (PTTD) OF BOTH LOWER EXTREMITIES: Status: ACTIVE | Noted: 2024-05-01

## 2024-06-13 PROBLEM — M76.822 POSTERIOR TIBIAL TENDON DYSFUNCTION (PTTD) OF BOTH LOWER EXTREMITIES: Status: ACTIVE | Noted: 2024-05-01

## 2024-06-13 PROBLEM — M19.072 DEGENERATIVE JOINT DISEASE OF BOTH ANKLES AND FEET: Status: ACTIVE | Noted: 2024-05-01

## 2024-06-13 PROBLEM — M19.071 DEGENERATIVE JOINT DISEASE OF BOTH ANKLES AND FEET: Status: ACTIVE | Noted: 2024-05-01

## 2024-06-13 PROBLEM — M21.40 FLAT FOOT: Status: ACTIVE | Noted: 2024-05-01

## 2024-06-13 PROBLEM — E11.42 DIABETIC PERIPHERAL NEUROPATHY (MULTI): Status: ACTIVE | Noted: 2024-06-13

## 2024-06-13 PROBLEM — L85.3 XEROSIS OF SKIN: Status: ACTIVE | Noted: 2024-05-01

## 2024-06-13 PROBLEM — M79.675 PAIN IN TOES OF BOTH FEET: Status: ACTIVE | Noted: 2024-05-01

## 2024-06-13 PROCEDURE — 3075F SYST BP GE 130 - 139MM HG: CPT | Performed by: PHYSICIAN ASSISTANT

## 2024-06-13 PROCEDURE — 99215 OFFICE O/P EST HI 40 MIN: CPT | Performed by: PHYSICIAN ASSISTANT

## 2024-06-13 PROCEDURE — 3044F HG A1C LEVEL LT 7.0%: CPT | Performed by: PHYSICIAN ASSISTANT

## 2024-06-13 PROCEDURE — 3050F LDL-C >= 130 MG/DL: CPT | Performed by: PHYSICIAN ASSISTANT

## 2024-06-13 PROCEDURE — 3079F DIAST BP 80-89 MM HG: CPT | Performed by: PHYSICIAN ASSISTANT

## 2024-06-13 RX ORDER — DILTIAZEM HYDROCHLORIDE 30 MG/1
30 TABLET, FILM COATED ORAL 2 TIMES DAILY PRN
Qty: 60 TABLET | Refills: 2 | Status: SHIPPED | OUTPATIENT
Start: 2024-06-13 | End: 2025-06-13

## 2024-06-13 NOTE — PROGRESS NOTES
Chief Complaint:   Follow-up (Patient is here for cardiac clearance and hospital follow up )     History Of Present Illness:    6/13/24  Patient recently presented to ED once again with complaints of atypical chest pain, acute ischemic workup was negative including negative serial troponin levels, and no ischemic findings on EKG.  Patient reports persistent episodic palpitations and tachycardic pulse rates likely correlating with previously noted, brief episodes of nonsustained SVT vs atrial tachycardia.  Patient is tentatively scheduled for elective right knee TKA revision, for which she will be cleared without reservations from a cardiovascular standpoint.  Patient denies  dyspnea on exertion, shortness of breath at rest, diaphoresis, nausea/vomiting, back pain, headache, lightheadedness, dizziness, syncope or presyncopal episodes, active bleeding signs or symptoms, excessive weight gain, muscle or joint pain, claudication.    3/21/24  Josemanuel Reinoso is a 58 y.o. female presenting with chronic and recurrent atypical chest pain.  Patient was recently hospitalized with these symptoms, acute ischemia was ruled out, therefore she underwent coronary CTA displaying normal coronary anatomy without evidence of underlying CAD.  Previously patient underwent LHC with Dr. Pearson in 2017 displaying normal coronary anatomy at that time as well.  Chest pain is somewhat pleuritic in nature, and nearly constant.  Due to patient's underlying autoimmunity and diagnosis of SLE I do feel this may represent symptoms of pericarditis.  Results of all lab tests, cardiac imaging, and coronary evaluation discussed with patient on exam today.     Last Recorded Vitals:  Vitals:    06/13/24 0934   BP: 134/82   BP Location: Left arm   Patient Position: Sitting   BP Cuff Size: Adult   Pulse: 73   SpO2: 94%       Past Medical History:  She has a past medical history of Personal history of other diseases of the circulatory system and Personal  history of other endocrine, nutritional and metabolic disease.    Past Surgical History:  She has a past surgical history that includes MR angio head wo IV contrast (7/14/2019); MR angio neck wo IV contrast (7/14/2019); and MR angio head wo IV contrast (7/14/2019).      Social History:  She reports that she has been smoking cigarettes. She has never been exposed to tobacco smoke. She has never used smokeless tobacco. She reports that she does not drink alcohol and does not use drugs.    Family History:  No family history on file.     Allergies:  Azithromycin, Doxycycline, Erythromycin, Fluticasone propion-salmeterol, Ibuprofen, Lamotrigine, Metformin, Penicillins, Pregabalin, Estradiol, and Nickel    Outpatient Medications:  Current Outpatient Medications   Medication Instructions    albuterol 90 mcg/actuation inhaler inhale 1 to 2 puffs by mouth every 4 hours Inhalation    albuterol 2.5 mg, nebulization, Every 6 hours PRN    aluminum hydrox-magnesium carb (Gaviscon)  mg/15 mL suspension oral suspension 16 mL, oral, Every 6 hours PRN    bisacodyl (Dulcolax) 5 mg EC tablet take 1 tablet by mouth once daily if needed for constipation    carvedilol (COREG) 25 mg, oral, 2 times daily (morning and late afternoon)    cetirizine (ZYRTEC) 10 mg, oral, Daily, prn    chlorthalidone (HYGROTON) 25 mg, oral, Daily RT    cholecalciferol (VITAMIN D3) 1,000 Units, oral, Daily    colchicine 0.6 mg tablet oral, Daily    cyanocobalamin (Vitamin B-12) 1,000 mcg tablet 1 tablet, oral, Daily RT    dexlansoprazole (DEXILANT) 60 mg, oral, Daily, Do not crush or chew.    dilTIAZem (CARDIZEM) 30 mg, oral, 2 times daily PRN    docusate sodium (COLACE) 100 mg, oral, 2 times daily PRN    empagliflozin (Jardiance) 10 mg 1 tablet, oral, Daily with breakfast    famotidine (PEPCID) 40 mg, oral, Nightly    fluticasone (Flonase) 50 mcg/actuation nasal spray 1 spray, Does not apply, 2 times daily PRN    fluticasone-umeclidin-vilanter (Trelegy  Ellipta) 200-62.5-25 mcg blister with device 1 puff, inhalation, Daily RT    furosemide (LASIX) 20 mg, oral, Daily PRN    hydroxychloroquine (PLAQUENIL) 200 mg, oral, Daily    hyoscyamine 0.125 mg disintegrating tablet DISSOLVE 1 TABLET UNDER THE TONGUE EVERY 4 HOURS AS NEEDED    ipratropium-albuteroL (Duo-Neb) 0.5-2.5 mg/3 mL nebulizer solution 3 mL, inhalation, Every 6 hours PRN    levoFLOXacin (LEVAQUIN) 500 mg, oral, Daily    lidocaine (Lidoderm) 5 % patch 1 patch, transdermal, Daily PRN    melatonin 5 mg, oral, Daily PRN    meloxicam (MOBIC) 15 mg, oral, Daily    metoclopramide (Reglan) 5 mg tablet 1 tablet, oral, Daily    miconazole (Micotin) 2 % cream 1 Application, Topical, As needed, To abdomen    montelukast (SINGULAIR) 10 mg, oral, Daily PRN    mycophenolate (CellCept) 250 mg capsule Take 1 capsule Monday Wednesday and Friday with dinner    ondansetron (Zofran) 4 mg tablet 1 tablet, oral, Every 6 hours PRN    oxybutynin XL (DITROPAN-XL) 10 mg, oral, Daily    oxyCODONE-acetaminophen (Percocet) 7.5-325 mg tablet 1 tablet, oral, Every 6 hours PRN    pantoprazole (PROTONIX) 40 mg, oral, Daily, Do not crush, chew, or split.    predniSONE (DELTASONE) 10 mg, oral, Daily    Pulmicort Flexhaler 90 mcg/actuation inhaler 1-2 puffs, inhalation, 2 times daily RT    rOPINIRole (REQUIP) 0.5 mg, oral, Nightly    rosuvastatin (Crestor) 20 mg tablet Take 1 tablet (20 mg) by mouth once daily.       Physical Exam:  Constitutional: awake and alert, oriented ×3, no apparent distress  Skin: warm, dry, good turgor no obvious lesions  Eyes: pupils equal, round, reactive to light, conjunctiva pink and noninjected, no discharge  HENT: normocephalic and atraumatic, mucous membranes moist, trachea midline with no masses/goiter  Cardiovascular: S1/S2 regular, no murmur no rubs/gallops, no carotid bruits, no JVD  Pulmonary: symmetrical chest expansion, lungs are clear to auscultation bilaterally, no wheezes/rales/rhonchi, normal  effort  Abdomen: nontender, nondistended, active bowel sounds, no ascites  Extremities: no cyanosis, clubbing, no LE edema no lesions; palpable pedal pulses  Neurologic: cranial nerves II - XII grossly intact, stable gait, no tremor       Last Labs:  CBC -  Lab Results   Component Value Date    WBC 8.6 06/05/2024    HGB 12.4 06/05/2024    HCT 40.6 06/05/2024    MCV 84 06/05/2024     06/05/2024       CMP -  Lab Results   Component Value Date    CALCIUM 9.1 06/05/2024    PROT 7.3 06/05/2024    ALBUMIN 4.1 06/05/2024    AST 17 06/05/2024    ALT 9 06/05/2024    ALKPHOS 93 06/05/2024    BILITOT 0.2 06/05/2024       LIPID PANEL -   Lab Results   Component Value Date    CHOL 208 (H) 04/29/2024    TRIG 154 (H) 04/29/2024    HDL 44.7 04/29/2024    CHHDL 4.7 04/29/2024    VLDL 31 04/29/2024    NHDL 163 (H) 04/29/2024       RENAL FUNCTION PANEL -   Lab Results   Component Value Date    GLUCOSE 83 06/05/2024     06/05/2024    K 4.2 06/05/2024     06/05/2024    CO2 28 06/05/2024    ANIONGAP 10 06/05/2024    BUN 5 (L) 06/05/2024    CREATININE 0.66 06/05/2024    CALCIUM 9.1 06/05/2024    ALBUMIN 4.1 06/05/2024        Lab Results   Component Value Date    BNP 92 06/05/2024    HGBA1C 6.8 (H) 03/14/2024       Last Cardiology Tests:  ECG:  ECG 12 Lead 03/14/2024 (Preliminary)      Echo:  Transthoracic Echo Complete 03/15/2024  1. Left ventricular systolic function is low normal with a 50-55% estimated ejection fraction.   2. There is concentric left ventricular hypertrophy.   3. RVSP within normal limits.    Ejection Fractions:  EF   Date/Time Value Ref Range Status   03/15/2024 01:38 PM 49 %        Cath:  No results found for this or any previous visit from the past 1095 days.      Stress Test:  NUCLEAR STRESS TEST 07/31/2023      Cardiac Imaging:  CT angio coronary arteries w C EVAL of cardiac structure morphology 03/15/2024  1. Right dominant system.  2. Study is limited by motion and misregistration artifact,  however  there appears to be normal coronary anatomy without clear evidence of  atherosclerotic changes or stenotic disease.  3. Small hiatal hernia.    Assessment/Plan   Problem List Items Addressed This Visit             ICD-10-CM       Cardiac and Vasculature    Essential hypertension (Chronic) I10    Chest discomfort R07.89    Palpitations R00.2    Nonsustained paroxysmal supraventricular tachycardia (CMS-HCC) - Primary I47.10    Relevant Medications    dilTIAZem (Cardizem) 30 mg immediate release tablet    Preop cardiovascular exam Z01.810       -No evidence of coronary disease on recent coronary CTA, [facility] in 2017 displaying no coronary disease    -Diltiazem HCl 30mg BID PRN for palpitations/tachycardia, continue all other current antihypertensive therapy and GDMT as prescribed    -Patient is cleared for surgery from a cardiovascular standpoint, she is low risk for development of perioperative cardiovascular events    -F/U with Dr. Browning as previously scheduled    Jose Arias PA-C

## 2024-06-14 ENCOUNTER — APPOINTMENT (OUTPATIENT)
Dept: GASTROENTEROLOGY | Facility: HOSPITAL | Age: 59
End: 2024-06-14
Payer: COMMERCIAL

## 2024-06-24 ENCOUNTER — TELEPHONE (OUTPATIENT)
Dept: RHEUMATOLOGY | Facility: CLINIC | Age: 59
End: 2024-06-24
Payer: COMMERCIAL

## 2024-06-24 DIAGNOSIS — M32.9 SYSTEMIC LUPUS ERYTHEMATOSUS, UNSPECIFIED SLE TYPE, UNSPECIFIED ORGAN INVOLVEMENT STATUS (MULTI): Primary | ICD-10-CM

## 2024-06-24 NOTE — TELEPHONE ENCOUNTER
Spoke with patient with patient's complaints of generalized pain in the arms and also feels sore all over. Per rheumatologist, will not be prescribing anything else for her FM. She is to contact her pain management team. She endorses that she has fuv in 2 days. She will be getting further TPMT testing done at her convenience. Pt aware that Rheumatologist would plan to start AZA after healed after arthoplasty revision. She would have to contact our office further.

## 2024-06-27 ENCOUNTER — OFFICE VISIT (OUTPATIENT)
Dept: RHEUMATOLOGY | Facility: CLINIC | Age: 59
End: 2024-06-27
Payer: COMMERCIAL

## 2024-06-27 VITALS
HEART RATE: 79 BPM | HEIGHT: 62 IN | SYSTOLIC BLOOD PRESSURE: 133 MMHG | BODY MASS INDEX: 46.38 KG/M2 | WEIGHT: 252 LBS | DIASTOLIC BLOOD PRESSURE: 84 MMHG | TEMPERATURE: 98.4 F

## 2024-06-27 DIAGNOSIS — M19.011 ARTHRITIS OF RIGHT SHOULDER REGION: Primary | ICD-10-CM

## 2024-06-27 DIAGNOSIS — R76.8 ANA POSITIVE: ICD-10-CM

## 2024-06-27 PROCEDURE — 3079F DIAST BP 80-89 MM HG: CPT | Performed by: INTERNAL MEDICINE

## 2024-06-27 PROCEDURE — 3044F HG A1C LEVEL LT 7.0%: CPT | Performed by: INTERNAL MEDICINE

## 2024-06-27 PROCEDURE — 99214 OFFICE O/P EST MOD 30 MIN: CPT | Performed by: INTERNAL MEDICINE

## 2024-06-27 PROCEDURE — 3050F LDL-C >= 130 MG/DL: CPT | Performed by: INTERNAL MEDICINE

## 2024-06-27 PROCEDURE — 3075F SYST BP GE 130 - 139MM HG: CPT | Performed by: INTERNAL MEDICINE

## 2024-06-27 RX ORDER — HYDROXYCHLOROQUINE SULFATE 200 MG/1
200 TABLET, FILM COATED ORAL 2 TIMES DAILY
Qty: 180 TABLET | Refills: 3 | Status: SHIPPED | OUTPATIENT
Start: 2024-06-27 | End: 2025-06-27

## 2024-06-27 ASSESSMENT — PAIN SCALES - GENERAL: PAINLEVEL: 10-WORST PAIN EVER

## 2024-06-27 NOTE — PROGRESS NOTES
She presents for follow-up evaluation with complaints of pain in the right shoulder and right upper arm.  She is planning to have revision surgery of the right total knee arthroplasty in 7/2024.  She was unable to tolerate mycophenolate due to burning abdominal pain.  She continues to take hydroxychloroquine 200 mg once per day.    There is moderate tenderness in the anterior and posterior aspects of the right shoulder without joint effusion.  There is pain on passive range of motion of the right shoulder.  There is preserved muscle strength in the right shoulder on internal rotation, external rotation, and abduction.    Laboratory (6/5/2024) WBC 8.6, hemoglobin 12.4, hematocrit 40.6, MCV 84, MCHC 30.5, platelets 342, BUN 15, creatinine 0.66, glucose 83, magnesium 2.25, calcium 9.1, albumin 4.1, alkaline phosphatase 93, AST 17, ALT 9, (5/17/2024) mitochondrial antibody 1: 80, cardiolipin antibodies normal, C3 174, C4 73, CRP 1.78, RUSSELL negative, EULALIA panel negative, anti-mitochondrial antibody 1: 80.    She has history of fibromyalgia, gastroparesis, lumbar spondylosis, pseudogout of the right knee status post bilateral knee arthroplasty needing revision of the right total knee arthroplasty, asthma, type 2 diabetes mellitus, history of obstructive sleep apnea, and systemic lupus erythematosus with history of positive RUSSELL.  She has currently a negative RUSSELL with negative EULALIA panel with positive mitochondrial antibody with normal liver function panel.  She has persistent pain in the right shoulder.    Reapply for Benlysta infusion for treatment of systemic lupus erythematosus.  She is to increase hydroxychloroquine from 200 mg once daily to 200 mg twice daily.  She is to return at the next available office appointment.  She was given intra-articular injection of triamcinolone 20 mg with 1 mL of 1% lidocaine to the right shoulder.

## 2024-07-12 DIAGNOSIS — I31.8 OTHER SPECIFIED DISEASES OF PERICARDIUM (HHS-HCC): ICD-10-CM

## 2024-07-22 RX ORDER — COLCHICINE 0.6 MG/1
0.6 TABLET ORAL 2 TIMES DAILY
COMMUNITY
Start: 2024-07-22

## 2024-07-24 ENCOUNTER — APPOINTMENT (OUTPATIENT)
Dept: CARDIOLOGY | Facility: CLINIC | Age: 59
End: 2024-07-24
Payer: COMMERCIAL

## 2024-07-24 VITALS
SYSTOLIC BLOOD PRESSURE: 128 MMHG | BODY MASS INDEX: 47.07 KG/M2 | WEIGHT: 255.8 LBS | DIASTOLIC BLOOD PRESSURE: 88 MMHG | HEIGHT: 62 IN | OXYGEN SATURATION: 96 % | HEART RATE: 82 BPM

## 2024-07-24 DIAGNOSIS — I10 ESSENTIAL HYPERTENSION: Primary | Chronic | ICD-10-CM

## 2024-07-24 DIAGNOSIS — I50.9 CHRONIC HEART FAILURE, UNSPECIFIED HEART FAILURE TYPE (MULTI): ICD-10-CM

## 2024-07-24 DIAGNOSIS — I50.32 HYPERTENSIVE HEART DISEASE WITH CHRONIC DIASTOLIC CONGESTIVE HEART FAILURE (MULTI): ICD-10-CM

## 2024-07-24 DIAGNOSIS — R00.2 PALPITATIONS: ICD-10-CM

## 2024-07-24 DIAGNOSIS — E78.5 HYPERLIPIDEMIA, UNSPECIFIED HYPERLIPIDEMIA TYPE: ICD-10-CM

## 2024-07-24 DIAGNOSIS — I11.0 HYPERTENSIVE HEART DISEASE WITH CHRONIC DIASTOLIC CONGESTIVE HEART FAILURE (MULTI): ICD-10-CM

## 2024-07-24 DIAGNOSIS — I49.3 PVC (PREMATURE VENTRICULAR CONTRACTION): ICD-10-CM

## 2024-07-24 PROCEDURE — 3044F HG A1C LEVEL LT 7.0%: CPT | Performed by: STUDENT IN AN ORGANIZED HEALTH CARE EDUCATION/TRAINING PROGRAM

## 2024-07-24 PROCEDURE — 3008F BODY MASS INDEX DOCD: CPT | Performed by: STUDENT IN AN ORGANIZED HEALTH CARE EDUCATION/TRAINING PROGRAM

## 2024-07-24 PROCEDURE — 3079F DIAST BP 80-89 MM HG: CPT | Performed by: STUDENT IN AN ORGANIZED HEALTH CARE EDUCATION/TRAINING PROGRAM

## 2024-07-24 PROCEDURE — 3074F SYST BP LT 130 MM HG: CPT | Performed by: STUDENT IN AN ORGANIZED HEALTH CARE EDUCATION/TRAINING PROGRAM

## 2024-07-24 PROCEDURE — 99214 OFFICE O/P EST MOD 30 MIN: CPT | Performed by: STUDENT IN AN ORGANIZED HEALTH CARE EDUCATION/TRAINING PROGRAM

## 2024-07-24 PROCEDURE — 3050F LDL-C >= 130 MG/DL: CPT | Performed by: STUDENT IN AN ORGANIZED HEALTH CARE EDUCATION/TRAINING PROGRAM

## 2024-07-24 RX ORDER — CICLOPIROX 80 MG/ML
1 SOLUTION TOPICAL NIGHTLY
COMMUNITY
Start: 2024-07-12

## 2024-07-24 RX ORDER — MUPIROCIN 20 MG/G
1 OINTMENT TOPICAL 2 TIMES DAILY
COMMUNITY
Start: 2024-07-22 | End: 2024-07-27

## 2024-07-24 RX ORDER — BLOOD-GLUCOSE SENSOR
1 EACH MISCELLANEOUS AS NEEDED
COMMUNITY
Start: 2024-07-19

## 2024-07-24 RX ORDER — CAPSAICIN 0 G/G
1 CREAM TOPICAL DAILY
COMMUNITY
Start: 2024-07-12

## 2024-07-24 RX ORDER — NALOXONE HYDROCHLORIDE 4 MG/.1ML
1 SPRAY NASAL AS NEEDED
COMMUNITY
Start: 2023-06-22

## 2024-07-24 RX ORDER — CETIRIZINE HYDROCHLORIDE, PSEUDOEPHEDRINE HYDROCHLORIDE 5; 120 MG/1; MG/1
1 TABLET, FILM COATED, EXTENDED RELEASE ORAL
COMMUNITY
Start: 2024-06-14

## 2024-07-24 NOTE — PROGRESS NOTES
"Chief Complaint:   PSVT, Chest Pain, Heart Failure, Hyperlipidemia, Palpitations, Hypertension, and PVCs (3 month follow up)     History Of Present Illness:    Josemanuel Reinoso is a 59 y.o. female returns for follow-up.  Patient is scheduled to go knee surgery next week.  Blood pressure is better controlled with carvedilol 25 mg twice a day.  She was placed on diltiazem for excessive PVCs but shared.  She is otherwise doing fine.  Her most recent cholesterol is elevated at 133.     Last Recorded Vitals:  Vitals:    07/24/24 0925   BP: 128/88   BP Location: Left arm   Patient Position: Sitting   BP Cuff Size: Large adult   Pulse: 82   SpO2: 96%   Weight: 116 kg (255 lb 12.8 oz)   Height: 1.575 m (5' 2\")       Review of Systems  ROS      Allergies:  Cephalexin, Azithromycin, Doxycycline, Erythromycin, Fluticasone propion-salmeterol, Ibuprofen, Lamotrigine, Metformin, Penicillins, Pregabalin, Estradiol, and Nickel    Outpatient Medications:  Current Outpatient Medications   Medication Instructions    albuterol 90 mcg/actuation inhaler inhale 1 to 2 puffs by mouth every 4 hours Inhalation    albuterol 2.5 mg, nebulization, Every 6 hours PRN    aluminum hydrox-magnesium carb (Gaviscon)  mg/15 mL suspension oral suspension 16 mL, oral, Every 6 hours PRN    bisacodyl (Dulcolax) 5 mg EC tablet take 1 tablet by mouth once daily if needed for constipation    capsaicin  cream (Capzasin-HP) 0.1 % cream cream 1 Application, Topical, Daily, apply to affected area every day    carvedilol (COREG) 25 mg, oral, 2 times daily (morning and late afternoon)    cetirizine (ZYRTEC) 10 mg, oral, Daily, prn    cetirizine-pseudoephedrine (ZyrTEC-D) 5-120 mg 12 hr tablet 1 tablet, oral, Daily RT    chlorthalidone (HYGROTON) 25 mg, oral, Daily RT    cholecalciferol (VITAMIN D3) 1,000 Units, oral, Daily    ciclopirox (Penlac) 8 % solution 1 Application, Topical, Nightly    colchicine 0.6 mg tablet oral, Daily    cyanocobalamin (Vitamin " B-12) 1,000 mcg tablet 1 tablet, oral, Daily RT    dexlansoprazole (DEXILANT) 60 mg, oral, Daily, Do not crush or chew.    dilTIAZem (CARDIZEM) 30 mg, oral, 2 times daily PRN    docusate sodium (COLACE) 100 mg, oral, 2 times daily PRN    empagliflozin (Jardiance) 10 mg 1 tablet, oral, Daily with breakfast    famotidine (PEPCID) 40 mg, oral, Nightly    fluticasone (Flonase) 50 mcg/actuation nasal spray 1 spray, Does not apply, 2 times daily PRN    fluticasone-umeclidin-vilanter (Trelegy Ellipta) 200-62.5-25 mcg blister with device 1 puff, inhalation, Daily RT    FreeStyle Jasmyne 3 Sensor device 1 kit, subcutaneous, As needed    furosemide (LASIX) 20 mg, oral, Daily PRN    hydroxychloroquine (PLAQUENIL) 200 mg, oral, 2 times daily    hyoscyamine 0.125 mg disintegrating tablet DISSOLVE 1 TABLET UNDER THE TONGUE EVERY 4 HOURS AS NEEDED    ipratropium-albuteroL (Duo-Neb) 0.5-2.5 mg/3 mL nebulizer solution 3 mL, inhalation, Every 6 hours PRN    lidocaine (Lidoderm) 5 % patch 1 patch, transdermal, Daily PRN    melatonin 5 mg, oral, Daily PRN    meloxicam (MOBIC) 15 mg, oral, Daily    metoclopramide (Reglan) 5 mg tablet 1 tablet, oral, Daily    miconazole (Micotin) 2 % cream 1 Application, Topical, As needed, To abdomen    montelukast (SINGULAIR) 10 mg, oral, Daily PRN    mupirocin (Bactroban) 2 % ointment 1 Application, Topical, 2 times daily    naloxone (Narcan) 4 mg/0.1 mL nasal spray 1 spray, nasal, As needed    ondansetron (Zofran) 4 mg tablet 1 tablet, oral, Every 6 hours PRN    oxybutynin XL (DITROPAN-XL) 10 mg, oral, Daily    oxyCODONE-acetaminophen (Percocet) 7.5-325 mg tablet 1 tablet, oral, Every 6 hours PRN    pantoprazole (PROTONIX) 40 mg, oral, Daily, Do not crush, chew, or split.    predniSONE (DELTASONE) 10 mg, oral, Daily    Pulmicort Flexhaler 90 mcg/actuation inhaler 1-2 puffs, inhalation, 2 times daily RT    rOPINIRole (REQUIP) 0.5 mg, oral, Nightly    rosuvastatin (Crestor) 20 mg tablet Take 1 tablet (20  mg) by mouth once daily.       Physical Exam:  General: No acute distress,  A&O x3, obese female  Skin: Warm and dry  Neck: JVD is not elevated  ENT: Moist mucous membranes no lesions appreciated  Pulmonary: CTAB  Cards: Regular rate rhythm, no murmurs gallops or rubs appreciated normal S1-S2  Abdomen: Soft nontender nondistended  Extremities: Trace bilateral edema  Psych: Appropriate mood and affect        Last Labs:  CBC -  Lab Results   Component Value Date    WBC 8.6 06/05/2024    HGB 12.4 06/05/2024    HCT 40.6 06/05/2024    MCV 84 06/05/2024     06/05/2024       CMP -  Lab Results   Component Value Date    CALCIUM 9.1 06/05/2024    PROT 7.3 06/05/2024    ALBUMIN 4.1 06/05/2024    AST 17 06/05/2024    ALT 9 06/05/2024    ALKPHOS 93 06/05/2024    BILITOT 0.2 06/05/2024       LIPID PANEL -   Lab Results   Component Value Date    CHOL 208 (H) 04/29/2024    TRIG 154 (H) 04/29/2024    HDL 44.7 04/29/2024    CHHDL 4.7 04/29/2024    VLDL 31 04/29/2024    NHDL 163 (H) 04/29/2024       RENAL FUNCTION PANEL -   Lab Results   Component Value Date    GLUCOSE 83 06/05/2024     06/05/2024    K 4.2 06/05/2024     06/05/2024    CO2 28 06/05/2024    ANIONGAP 10 06/05/2024    BUN 5 (L) 06/05/2024    CREATININE 0.66 06/05/2024    CALCIUM 9.1 06/05/2024    ALBUMIN 4.1 06/05/2024        Lab Results   Component Value Date    BNP 92 06/05/2024    HGBA1C 6.8 (H) 03/14/2024       Last Cardiology Tests:  ECG:  Encounter Date: 06/05/24   ECG 12 lead   Result Value    Ventricular Rate 71    Atrial Rate 71    AZ Interval 178    QRS Duration 95    QT Interval 402    QTC Calculation(Bazett) 437    P Axis 31    R Axis 7    T Axis -3    QRS Count 11    Q Onset 249    T Offset 450    QTC Fredericia 425    Narrative    Sinus rhythm  Left ventricular hypertrophy  Borderline T abnormalities, diffuse leads    See ED provider note for full interpretation and clinical correlation  Confirmed by Erika Webber ([id number]) on  6/14/2024 12:51:37 PM        Echo:  No results found for this or any previous visit from the past 1095 days.       Ejection Fractions:  EF   Date/Time Value Ref Range Status   03/15/2024 01:38 PM 49 %      Assessment and Plan    1.  PVCs/paroxysmal SVT: Confirmed with most recent Holter monitor.  Echo showed normal ejection fraction with LVH.  Baseline EKG reviewed.  We are switching metoprolol to carvedilol to improve blood pressure management and additional beta-blockade.  Patient is off of amitriptyline.  She uses Cardizem as needed for breakthrough ectopic events.     2.  Chest pain: Atypical: Pain is different in association with food or lupus flare.  She has had previous stress test 1 year ago although this was a regadenoson nuclear stress test.  Her calcium score is 0 which is limited in the context of chronic autoimmune disease.  Risk factors include hypertension, hyperlipidemia and type 2 diabetes.  Our plan is to check fasting lipid panel.  In light of ongoing abdominal discomfort and likely need for gallbladder intervention.  Coronary CTA did not show evidence of obstructive CAD.  She also was treated with a short course of colchicine which had not no impact on her symptoms.  Suspect symptoms are related to PVCs as described above.     3.  HFpEF: Chronic.  Volume status appears to be relatively controlled with Jardiance 10 mg daily and chlorthalidone.  Provide Lasix 20 mg to take as needed for weight gain or orthopnea symptoms.       4.  COPD/asthma: Management as per pulmonology.  Will try to limit exposure to beta-blockers in this context.     5.  Hypertension: LVH appreciated with echocardiogram.  Better controlled with carvedilol 25 mg twice a day.     6.  Normocytic anemia: Appreciated on CBC 1 month ago.  Patient is complaining of fatigue and tiredness.  Will check CBC with iron studies.  If abnormal we will provide iron supplements.     7.  Tobacco consumption: Precontemplative.  Advised smoking  cessation.    8.  Hyperlipidemia: LDL is 133.  She is on rosuvastatin 20 mg daily.  Reassess at next appointment.     Follow-up in 6 months    (This note was generated with voice recognition software and may contain errors including spelling, grammar, syntax and missed recognition of what was dictated, of which may not have been fully corrected)       Osito Browning MD PhD

## 2024-08-07 RX ORDER — COLCHICINE 0.6 MG/1
0.6 TABLET ORAL 2 TIMES DAILY
COMMUNITY
Start: 2024-08-07

## 2024-08-09 ENCOUNTER — TELEPHONE (OUTPATIENT)
Dept: RHEUMATOLOGY | Facility: CLINIC | Age: 59
End: 2024-08-09
Payer: COMMERCIAL

## 2024-08-09 NOTE — TELEPHONE ENCOUNTER
Mayela the charge nurse called back indicating that it looks like the Benlysta was never approved. Patient wants to cancel as not approved. Mayela would still like you to call her to discuss at [phone].

## 2024-08-09 NOTE — TELEPHONE ENCOUNTER
MOJGAN Ross called today asking for you to call to speak with the charge nurse, Mayela about Benlysta. Does patient need a loading dose of Benlysta? Patient is scheduled for 8/13. Please call to discuss at [phone].

## 2024-08-13 ENCOUNTER — APPOINTMENT (OUTPATIENT)
Dept: HEMATOLOGY/ONCOLOGY | Facility: CLINIC | Age: 59
End: 2024-08-13
Payer: COMMERCIAL

## 2024-08-13 NOTE — TELEPHONE ENCOUNTER
Return call placed to Vandana to make Charge Nurse aMyela aware of provider instruction for loading dose with Benlysta treatment. Mayela was out and Charge Nurse Felicitas was busy. Message left with Fern to pass along. Number to office given if any questions were had.

## 2024-08-27 ENCOUNTER — APPOINTMENT (OUTPATIENT)
Dept: HEMATOLOGY/ONCOLOGY | Facility: CLINIC | Age: 59
End: 2024-08-27
Payer: COMMERCIAL

## 2024-08-29 ENCOUNTER — LAB REQUISITION (OUTPATIENT)
Dept: LAB | Facility: HOSPITAL | Age: 59
End: 2024-08-29

## 2024-08-29 DIAGNOSIS — I10 ESSENTIAL (PRIMARY) HYPERTENSION: ICD-10-CM

## 2024-08-29 LAB
ANION GAP SERPL CALC-SCNC: 13 MMOL/L (ref 10–20)
BASOPHILS # BLD AUTO: 0.04 X10*3/UL (ref 0–0.1)
BASOPHILS NFR BLD AUTO: 0.4 %
BUN SERPL-MCNC: 13 MG/DL (ref 6–23)
CALCIUM SERPL-MCNC: 8.9 MG/DL (ref 8.6–10.3)
CHLORIDE SERPL-SCNC: 102 MMOL/L (ref 98–107)
CO2 SERPL-SCNC: 29 MMOL/L (ref 21–32)
CREAT SERPL-MCNC: 1 MG/DL (ref 0.5–1.05)
EGFRCR SERPLBLD CKD-EPI 2021: 65 ML/MIN/1.73M*2
EOSINOPHIL # BLD AUTO: 0.14 X10*3/UL (ref 0–0.7)
EOSINOPHIL NFR BLD AUTO: 1.6 %
ERYTHROCYTE [DISTWIDTH] IN BLOOD BY AUTOMATED COUNT: 16.4 % (ref 11.5–14.5)
GLUCOSE SERPL-MCNC: 134 MG/DL (ref 74–99)
HCT VFR BLD AUTO: 26.2 % (ref 36–46)
HGB BLD-MCNC: 8.1 G/DL (ref 12–16)
IMM GRANULOCYTES # BLD AUTO: 0.06 X10*3/UL (ref 0–0.7)
IMM GRANULOCYTES NFR BLD AUTO: 0.7 % (ref 0–0.9)
LYMPHOCYTES # BLD AUTO: 3.08 X10*3/UL (ref 1.2–4.8)
LYMPHOCYTES NFR BLD AUTO: 34.4 %
MCH RBC QN AUTO: 26.6 PG (ref 26–34)
MCHC RBC AUTO-ENTMCNC: 30.9 G/DL (ref 32–36)
MCV RBC AUTO: 86 FL (ref 80–100)
MONOCYTES # BLD AUTO: 0.73 X10*3/UL (ref 0.1–1)
MONOCYTES NFR BLD AUTO: 8.2 %
NEUTROPHILS # BLD AUTO: 4.9 X10*3/UL (ref 1.2–7.7)
NEUTROPHILS NFR BLD AUTO: 54.7 %
NRBC BLD-RTO: 0 /100 WBCS (ref 0–0)
PLATELET # BLD AUTO: 532 X10*3/UL (ref 150–450)
POTASSIUM SERPL-SCNC: 3.6 MMOL/L (ref 3.5–5.3)
RBC # BLD AUTO: 3.05 X10*6/UL (ref 4–5.2)
SODIUM SERPL-SCNC: 140 MMOL/L (ref 136–145)
WBC # BLD AUTO: 9 X10*3/UL (ref 4.4–11.3)

## 2024-08-29 PROCEDURE — 82374 ASSAY BLOOD CARBON DIOXIDE: CPT | Mod: OUT | Performed by: INTERNAL MEDICINE

## 2024-08-29 PROCEDURE — 85025 COMPLETE CBC W/AUTO DIFF WBC: CPT | Mod: OUT | Performed by: INTERNAL MEDICINE

## 2024-09-03 ENCOUNTER — APPOINTMENT (OUTPATIENT)
Dept: HEMATOLOGY/ONCOLOGY | Facility: CLINIC | Age: 59
End: 2024-09-03
Payer: COMMERCIAL

## 2024-09-05 ENCOUNTER — LAB REQUISITION (OUTPATIENT)
Dept: LAB | Facility: HOSPITAL | Age: 59
End: 2024-09-05
Payer: COMMERCIAL

## 2024-09-05 DIAGNOSIS — I10 ESSENTIAL (PRIMARY) HYPERTENSION: ICD-10-CM

## 2024-09-05 LAB
ANION GAP SERPL CALC-SCNC: 12 MMOL/L (ref 10–20)
BASOPHILS # BLD AUTO: 0.1 X10*3/UL (ref 0–0.1)
BASOPHILS NFR BLD AUTO: 1.3 %
BUN SERPL-MCNC: 21 MG/DL (ref 6–23)
CALCIUM SERPL-MCNC: 8.7 MG/DL (ref 8.6–10.3)
CHLORIDE SERPL-SCNC: 104 MMOL/L (ref 98–107)
CO2 SERPL-SCNC: 28 MMOL/L (ref 21–32)
CREAT SERPL-MCNC: 1.69 MG/DL (ref 0.5–1.05)
EGFRCR SERPLBLD CKD-EPI 2021: 34 ML/MIN/1.73M*2
EOSINOPHIL # BLD AUTO: 0.3 X10*3/UL (ref 0–0.7)
EOSINOPHIL NFR BLD AUTO: 4 %
ERYTHROCYTE [DISTWIDTH] IN BLOOD BY AUTOMATED COUNT: 16.5 % (ref 11.5–14.5)
GLUCOSE SERPL-MCNC: 99 MG/DL (ref 74–99)
HCT VFR BLD AUTO: 26.2 % (ref 36–46)
HGB BLD-MCNC: 8 G/DL (ref 12–16)
IMM GRANULOCYTES # BLD AUTO: 0.02 X10*3/UL (ref 0–0.7)
IMM GRANULOCYTES NFR BLD AUTO: 0.3 % (ref 0–0.9)
LYMPHOCYTES # BLD AUTO: 3.81 X10*3/UL (ref 1.2–4.8)
LYMPHOCYTES NFR BLD AUTO: 51.2 %
MCH RBC QN AUTO: 26.1 PG (ref 26–34)
MCHC RBC AUTO-ENTMCNC: 30.5 G/DL (ref 32–36)
MCV RBC AUTO: 86 FL (ref 80–100)
MONOCYTES # BLD AUTO: 0.66 X10*3/UL (ref 0.1–1)
MONOCYTES NFR BLD AUTO: 8.9 %
NEUTROPHILS # BLD AUTO: 2.55 X10*3/UL (ref 1.2–7.7)
NEUTROPHILS NFR BLD AUTO: 34.3 %
NRBC BLD-RTO: 0 /100 WBCS (ref 0–0)
PLATELET # BLD AUTO: 581 X10*3/UL (ref 150–450)
POTASSIUM SERPL-SCNC: 3.9 MMOL/L (ref 3.5–5.3)
RBC # BLD AUTO: 3.06 X10*6/UL (ref 4–5.2)
SODIUM SERPL-SCNC: 140 MMOL/L (ref 136–145)
WBC # BLD AUTO: 7.4 X10*3/UL (ref 4.4–11.3)

## 2024-09-05 PROCEDURE — 36415 COLL VENOUS BLD VENIPUNCTURE: CPT | Mod: OUT | Performed by: INTERNAL MEDICINE

## 2024-09-05 PROCEDURE — 80048 BASIC METABOLIC PNL TOTAL CA: CPT | Mod: OUT | Performed by: INTERNAL MEDICINE

## 2024-09-05 PROCEDURE — 85025 COMPLETE CBC W/AUTO DIFF WBC: CPT | Mod: OUT | Performed by: INTERNAL MEDICINE

## 2024-09-07 DIAGNOSIS — I47.10 NONSUSTAINED PAROXYSMAL SUPRAVENTRICULAR TACHYCARDIA (CMS-HCC): ICD-10-CM

## 2024-09-07 DIAGNOSIS — I49.3 PVC (PREMATURE VENTRICULAR CONTRACTION): ICD-10-CM

## 2024-09-09 ENCOUNTER — TELEPHONE (OUTPATIENT)
Dept: CARDIOLOGY | Facility: CLINIC | Age: 59
End: 2024-09-09
Payer: COMMERCIAL

## 2024-09-09 DIAGNOSIS — I10 ESSENTIAL HYPERTENSION: ICD-10-CM

## 2024-09-09 DIAGNOSIS — I49.3 PVC (PREMATURE VENTRICULAR CONTRACTION): ICD-10-CM

## 2024-09-09 RX ORDER — COLCHICINE 0.6 MG/1
0.6 TABLET ORAL 2 TIMES DAILY
COMMUNITY
Start: 2024-09-09

## 2024-09-09 NOTE — TELEPHONE ENCOUNTER
Pharmacy is asking for refill on her diltiazem 30mg- It is written to take as needed    Last office note from 7-24 states: She uses Cardizem as needed for breakthrough ectopic events.     After speaking with the patient, she does take it more frequently because it does keep her BP low    Not sure if the med/ directions should be changed to reflect this?

## 2024-09-09 NOTE — TELEPHONE ENCOUNTER
Pharmacy is asking for refill on her diltiazem 30mg  It is written to take as needed  Last office note from 7-24 states: She uses Cardizem as needed for breakthrough ectopic events.     After speaking with the patient, she does take it more frequently because it does keep her BP low    Not sure if the med/ directions should be changed to reflect this?

## 2024-09-10 DIAGNOSIS — I49.3 PVC (PREMATURE VENTRICULAR CONTRACTION): ICD-10-CM

## 2024-09-10 DIAGNOSIS — I47.10 NONSUSTAINED PAROXYSMAL SUPRAVENTRICULAR TACHYCARDIA (CMS-HCC): ICD-10-CM

## 2024-09-10 RX ORDER — DILTIAZEM HYDROCHLORIDE 30 MG/1
30 TABLET, FILM COATED ORAL 2 TIMES DAILY PRN
Qty: 60 TABLET | Refills: 6 | Status: SHIPPED | OUTPATIENT
Start: 2024-09-10 | End: 2024-09-10 | Stop reason: ALTCHOICE

## 2024-09-10 NOTE — TELEPHONE ENCOUNTER
Spoke to pt - pt states she has been taking medication for her blood pressure. States bp is more controlled when taking Diltiazem 30mg daily. BP today - 130/80. Secure chat sent to Dr. Browning. Per Dr. Browning: We can switch her to cardizem extend release formulation 120mg daily.    Spoke to pt and made aware. Updated med list. Order pended. Instructed to monitor blood pressures and call office with any questions/concerns.

## 2024-09-11 RX ORDER — DILTIAZEM HYDROCHLORIDE 120 MG/1
120 CAPSULE, COATED, EXTENDED RELEASE ORAL DAILY
Qty: 30 CAPSULE | Refills: 11 | Status: SHIPPED | OUTPATIENT
Start: 2024-09-11 | End: 2025-09-11

## 2024-09-11 RX ORDER — DILTIAZEM HYDROCHLORIDE 30 MG/1
TABLET, FILM COATED ORAL
Qty: 60 TABLET | Refills: 6 | Status: SHIPPED | OUTPATIENT
Start: 2024-09-11

## 2024-10-30 ENCOUNTER — APPOINTMENT (OUTPATIENT)
Dept: RHEUMATOLOGY | Facility: CLINIC | Age: 59
End: 2024-10-30
Payer: COMMERCIAL

## 2024-10-30 VITALS
HEART RATE: 78 BPM | DIASTOLIC BLOOD PRESSURE: 88 MMHG | BODY MASS INDEX: 45.64 KG/M2 | SYSTOLIC BLOOD PRESSURE: 146 MMHG | HEIGHT: 62 IN | WEIGHT: 248 LBS

## 2024-10-30 DIAGNOSIS — D50.8 OTHER IRON DEFICIENCY ANEMIA: ICD-10-CM

## 2024-10-30 DIAGNOSIS — R76.8 ANA POSITIVE: Primary | ICD-10-CM

## 2024-10-30 PROCEDURE — 99214 OFFICE O/P EST MOD 30 MIN: CPT | Performed by: INTERNAL MEDICINE

## 2024-10-30 PROCEDURE — 3044F HG A1C LEVEL LT 7.0%: CPT | Performed by: INTERNAL MEDICINE

## 2024-10-30 PROCEDURE — 3008F BODY MASS INDEX DOCD: CPT | Performed by: INTERNAL MEDICINE

## 2024-10-30 PROCEDURE — 3050F LDL-C >= 130 MG/DL: CPT | Performed by: INTERNAL MEDICINE

## 2024-10-30 PROCEDURE — 3079F DIAST BP 80-89 MM HG: CPT | Performed by: INTERNAL MEDICINE

## 2024-10-30 PROCEDURE — 3077F SYST BP >= 140 MM HG: CPT | Performed by: INTERNAL MEDICINE

## 2024-10-30 RX ORDER — FERROUS GLUCONATE 325 MG
38 TABLET ORAL
Qty: 7 TABLET | Refills: 0 | Status: SHIPPED | OUTPATIENT
Start: 2024-10-30 | End: 2024-11-06

## 2024-10-30 ASSESSMENT — PAIN SCALES - GENERAL: PAINLEVEL_OUTOF10: 9

## 2024-11-01 ENCOUNTER — INFUSION (OUTPATIENT)
Dept: HEMATOLOGY/ONCOLOGY | Facility: CLINIC | Age: 59
End: 2024-11-01
Payer: COMMERCIAL

## 2024-11-01 VITALS
WEIGHT: 254.85 LBS | DIASTOLIC BLOOD PRESSURE: 83 MMHG | HEIGHT: 62 IN | TEMPERATURE: 96.8 F | BODY MASS INDEX: 46.9 KG/M2 | HEART RATE: 87 BPM | SYSTOLIC BLOOD PRESSURE: 148 MMHG | RESPIRATION RATE: 20 BRPM

## 2024-11-01 DIAGNOSIS — M32.9 SYSTEMIC LUPUS ERYTHEMATOSUS, UNSPECIFIED SLE TYPE, UNSPECIFIED ORGAN INVOLVEMENT STATUS (MULTI): ICD-10-CM

## 2024-11-01 PROCEDURE — 2500000004 HC RX 250 GENERAL PHARMACY W/ HCPCS (ALT 636 FOR OP/ED): Performed by: INTERNAL MEDICINE

## 2024-11-01 PROCEDURE — 2500000001 HC RX 250 WO HCPCS SELF ADMINISTERED DRUGS (ALT 637 FOR MEDICARE OP): Performed by: INTERNAL MEDICINE

## 2024-11-01 PROCEDURE — 96375 TX/PRO/DX INJ NEW DRUG ADDON: CPT | Mod: INF

## 2024-11-01 PROCEDURE — 96365 THER/PROPH/DIAG IV INF INIT: CPT | Mod: INF

## 2024-11-01 RX ORDER — ACETAMINOPHEN 325 MG/1
650 TABLET ORAL ONCE
OUTPATIENT
Start: 2024-11-29 | End: 2024-11-29

## 2024-11-01 RX ORDER — ALBUTEROL SULFATE 0.83 MG/ML
3 SOLUTION RESPIRATORY (INHALATION) AS NEEDED
Status: DISCONTINUED | OUTPATIENT
Start: 2024-11-01 | End: 2024-11-01 | Stop reason: HOSPADM

## 2024-11-01 RX ORDER — HEPARIN SODIUM,PORCINE/PF 10 UNIT/ML
50 SYRINGE (ML) INTRAVENOUS AS NEEDED
OUTPATIENT
Start: 2024-11-01

## 2024-11-01 RX ORDER — FAMOTIDINE 10 MG/ML
20 INJECTION INTRAVENOUS ONCE AS NEEDED
Status: DISCONTINUED | OUTPATIENT
Start: 2024-11-01 | End: 2024-11-01 | Stop reason: HOSPADM

## 2024-11-01 RX ORDER — DIPHENHYDRAMINE HYDROCHLORIDE 50 MG/ML
50 INJECTION INTRAMUSCULAR; INTRAVENOUS AS NEEDED
Status: DISCONTINUED | OUTPATIENT
Start: 2024-11-01 | End: 2024-11-01 | Stop reason: HOSPADM

## 2024-11-01 RX ORDER — HEPARIN 100 UNIT/ML
500 SYRINGE INTRAVENOUS AS NEEDED
OUTPATIENT
Start: 2024-11-01

## 2024-11-01 RX ORDER — ALBUTEROL SULFATE 0.83 MG/ML
3 SOLUTION RESPIRATORY (INHALATION) AS NEEDED
OUTPATIENT
Start: 2024-11-29

## 2024-11-01 RX ORDER — ACETAMINOPHEN 325 MG/1
650 TABLET ORAL ONCE
Status: COMPLETED | OUTPATIENT
Start: 2024-11-01 | End: 2024-11-01

## 2024-11-01 RX ORDER — EPINEPHRINE 0.3 MG/.3ML
0.3 INJECTION SUBCUTANEOUS EVERY 5 MIN PRN
Status: DISCONTINUED | OUTPATIENT
Start: 2024-11-01 | End: 2024-11-01 | Stop reason: HOSPADM

## 2024-11-01 RX ORDER — FAMOTIDINE 10 MG/ML
20 INJECTION INTRAVENOUS ONCE AS NEEDED
OUTPATIENT
Start: 2024-11-29

## 2024-11-01 RX ORDER — EPINEPHRINE 0.3 MG/.3ML
0.3 INJECTION SUBCUTANEOUS EVERY 5 MIN PRN
OUTPATIENT
Start: 2024-11-29

## 2024-11-01 RX ORDER — DIPHENHYDRAMINE HCL 25 MG
25 CAPSULE ORAL ONCE
OUTPATIENT
Start: 2024-11-29 | End: 2024-11-29

## 2024-11-01 RX ORDER — DIPHENHYDRAMINE HCL 25 MG
25 CAPSULE ORAL ONCE
Status: COMPLETED | OUTPATIENT
Start: 2024-11-01 | End: 2024-11-01

## 2024-11-01 RX ORDER — DIPHENHYDRAMINE HYDROCHLORIDE 50 MG/ML
50 INJECTION INTRAMUSCULAR; INTRAVENOUS AS NEEDED
OUTPATIENT
Start: 2024-11-29

## 2024-11-01 ASSESSMENT — PAIN SCALES - GENERAL: PAINLEVEL_OUTOF10: 9

## 2024-11-11 ENCOUNTER — OFFICE VISIT (OUTPATIENT)
Dept: CARDIOLOGY | Facility: CLINIC | Age: 59
End: 2024-11-11
Payer: COMMERCIAL

## 2024-11-11 ENCOUNTER — APPOINTMENT (OUTPATIENT)
Dept: HEMATOLOGY/ONCOLOGY | Facility: CLINIC | Age: 59
End: 2024-11-11
Payer: COMMERCIAL

## 2024-11-11 VITALS
BODY MASS INDEX: 46.78 KG/M2 | OXYGEN SATURATION: 96 % | SYSTOLIC BLOOD PRESSURE: 142 MMHG | DIASTOLIC BLOOD PRESSURE: 86 MMHG | WEIGHT: 254.2 LBS | HEIGHT: 62 IN | HEART RATE: 62 BPM

## 2024-11-11 DIAGNOSIS — R00.2 PALPITATIONS: ICD-10-CM

## 2024-11-11 DIAGNOSIS — I49.3 PVC (PREMATURE VENTRICULAR CONTRACTION): ICD-10-CM

## 2024-11-11 DIAGNOSIS — R07.89 CHEST DISCOMFORT: ICD-10-CM

## 2024-11-11 DIAGNOSIS — I11.0 HYPERTENSIVE HEART DISEASE WITH CHRONIC DIASTOLIC CONGESTIVE HEART FAILURE: ICD-10-CM

## 2024-11-11 DIAGNOSIS — D50.9 MICROCYTIC ANEMIA: Primary | ICD-10-CM

## 2024-11-11 DIAGNOSIS — I10 ESSENTIAL HYPERTENSION: Chronic | ICD-10-CM

## 2024-11-11 DIAGNOSIS — I50.9 CHRONIC HEART FAILURE, UNSPECIFIED HEART FAILURE TYPE: ICD-10-CM

## 2024-11-11 DIAGNOSIS — I50.32 HYPERTENSIVE HEART DISEASE WITH CHRONIC DIASTOLIC CONGESTIVE HEART FAILURE: ICD-10-CM

## 2024-11-11 DIAGNOSIS — I27.20 PULMONARY HYPERTENSION (MULTI): ICD-10-CM

## 2024-11-11 DIAGNOSIS — I47.10 NONSUSTAINED PAROXYSMAL SUPRAVENTRICULAR TACHYCARDIA (CMS-HCC): ICD-10-CM

## 2024-11-11 PROCEDURE — 3079F DIAST BP 80-89 MM HG: CPT | Performed by: STUDENT IN AN ORGANIZED HEALTH CARE EDUCATION/TRAINING PROGRAM

## 2024-11-11 PROCEDURE — 3050F LDL-C >= 130 MG/DL: CPT | Performed by: STUDENT IN AN ORGANIZED HEALTH CARE EDUCATION/TRAINING PROGRAM

## 2024-11-11 PROCEDURE — 3044F HG A1C LEVEL LT 7.0%: CPT | Performed by: STUDENT IN AN ORGANIZED HEALTH CARE EDUCATION/TRAINING PROGRAM

## 2024-11-11 PROCEDURE — 3008F BODY MASS INDEX DOCD: CPT | Performed by: STUDENT IN AN ORGANIZED HEALTH CARE EDUCATION/TRAINING PROGRAM

## 2024-11-11 PROCEDURE — 99214 OFFICE O/P EST MOD 30 MIN: CPT | Performed by: STUDENT IN AN ORGANIZED HEALTH CARE EDUCATION/TRAINING PROGRAM

## 2024-11-11 PROCEDURE — 3077F SYST BP >= 140 MM HG: CPT | Performed by: STUDENT IN AN ORGANIZED HEALTH CARE EDUCATION/TRAINING PROGRAM

## 2024-11-11 RX ORDER — PROMETHAZINE HYDROCHLORIDE AND DEXTROMETHORPHAN HYDROBROMIDE 6.25; 15 MG/5ML; MG/5ML
5 SYRUP ORAL AS NEEDED
COMMUNITY
Start: 2024-08-27

## 2024-11-11 RX ORDER — FERROUS SULFATE 325(65) MG
325 TABLET, DELAYED RELEASE (ENTERIC COATED) ORAL
Qty: 30 TABLET | Refills: 3 | Status: SHIPPED | OUTPATIENT
Start: 2024-11-11 | End: 2025-03-11

## 2024-11-11 RX ORDER — FUROSEMIDE 20 MG/1
20 TABLET ORAL DAILY PRN
Qty: 30 TABLET | Refills: 11 | Status: SHIPPED | OUTPATIENT
Start: 2024-11-11 | End: 2025-11-11

## 2024-11-11 RX ORDER — HYDROCODONE BITARTRATE AND ACETAMINOPHEN 5; 325 MG/1; MG/1
1 TABLET ORAL EVERY 6 HOURS PRN
COMMUNITY
Start: 2024-10-31

## 2024-11-11 RX ORDER — AZELASTINE 1 MG/ML
1 SPRAY, METERED NASAL 2 TIMES DAILY
COMMUNITY
Start: 2024-08-27

## 2024-11-11 RX ORDER — CROMOLYN SODIUM 40 MG/ML
1 SOLUTION/ DROPS OPHTHALMIC 4 TIMES DAILY
COMMUNITY
Start: 2024-08-03

## 2024-11-11 RX ORDER — FLUTICASONE FUROATE, UMECLIDINIUM BROMIDE AND VILANTEROL TRIFENATATE 100; 62.5; 25 UG/1; UG/1; UG/1
1 POWDER RESPIRATORY (INHALATION) DAILY
COMMUNITY
Start: 2024-08-28

## 2024-11-11 RX ORDER — SEMAGLUTIDE 0.68 MG/ML
0.25 INJECTION, SOLUTION SUBCUTANEOUS WEEKLY
COMMUNITY
Start: 2024-09-10

## 2024-11-11 NOTE — PROGRESS NOTES
"Chief Complaint:   PSVT, Hypertension, Heart Failure, Hyperlipidemia, Palpitations, and PVCs (6 month follow up)     History Of Present Illness:    Josemanuel Reinoso is a 59 y.o. female returns to clinic for follow-up.  Patient underwent her knee surgery has been recovering.  She has had more shortness of breath in the setting of dietary indiscretion.  She does complain of fatigue and weakness.  She has a chronic microcytic anemia received a short course of iron through rheumatology.  Denies chest pain.  Blood pressures have been stable.     Last Recorded Vitals:  Vitals:    11/11/24 1042   BP: 142/86   BP Location: Left arm   Patient Position: Sitting   BP Cuff Size: Large adult   Pulse: 62   SpO2: 96%   Weight: 115 kg (254 lb 3.2 oz)   Height: 1.575 m (5' 2\")       Review of Systems  ROS      Allergies:  Cephalexin, Azithromycin, Doxycycline, Erythromycin, Fluticasone propion-salmeterol, Ibuprofen, Lamotrigine, Metformin, Penicillins, Pregabalin, Estradiol, and Nickel    Outpatient Medications:  Current Outpatient Medications   Medication Instructions    albuterol 90 mcg/actuation inhaler inhale 1 to 2 puffs by mouth every 4 hours Inhalation    albuterol 2.5 mg, Every 6 hours PRN    aluminum hydrox-magnesium carb (Gaviscon)  mg/15 mL suspension oral suspension 16 mL, oral, Every 6 hours PRN    azelastine (Astelin) 137 mcg (0.1 %) nasal spray 1 spray, 2 times daily    bisacodyl (Dulcolax) 5 mg EC tablet take 1 tablet by mouth once daily if needed for constipation    capsaicin  cream (Capzasin-HP) 0.1 % cream cream 1 Application, Daily    carvedilol (COREG) 25 mg, oral, 2 times daily (morning and late afternoon)    cetirizine (ZYRTEC) 10 mg, Daily    cetirizine-pseudoephedrine (ZyrTEC-D) 5-120 mg 12 hr tablet 1 tablet, Daily RT    chlorthalidone (HYGROTON) 25 mg, oral, Daily RT    cholecalciferol (VITAMIN D3) 1,000 Units, Daily    ciclopirox (Penlac) 8 % solution 1 Application, Nightly    colchicine 0.6 mg " tablet Daily    cromolyn (Opticrom) 4 % ophthalmic solution 1 drop, 4 times daily    cyanocobalamin (Vitamin B-12) 1,000 mcg tablet 1 tablet, Daily RT    dexlansoprazole (DEXILANT) 60 mg, oral, Daily, Do not crush or chew.    dilTIAZem (Cardizem) 30 mg immediate release tablet TAKE 1 TABLET BY MOUTH 2 TIMES A DAY AS NEEDED (PALPITATIONS).    docusate sodium (COLACE) 100 mg, 2 times daily PRN    empagliflozin (Jardiance) 10 mg 1 tablet, Daily with breakfast    famotidine (PEPCID) 40 mg, oral, Nightly    ferrous sulfate 325 (65 Fe) MG EC tablet 1 tablet, oral, Daily with breakfast, Do not crush, chew, or split.    fluticasone (Flonase) 50 mcg/actuation nasal spray 1 spray, 2 times daily PRN    fluticasone-umeclidin-vilanter (Trelegy Ellipta) 200-62.5-25 mcg blister with device 1 puff, Daily RT    FreeStyle Jasmyne 3 Sensor device 1 kit, As needed    furosemide (LASIX) 20 mg, oral, Daily PRN    HYDROcodone-acetaminophen (Norco) 5-325 mg tablet 1 tablet, Every 6 hours PRN    hydroxychloroquine (PLAQUENIL) 200 mg, oral, 2 times daily    hyoscyamine 0.125 mg disintegrating tablet DISSOLVE 1 TABLET UNDER THE TONGUE EVERY 4 HOURS AS NEEDED    ipratropium-albuteroL (Duo-Neb) 0.5-2.5 mg/3 mL nebulizer solution 3 mL, Every 6 hours PRN    lidocaine (Lidoderm) 5 % patch 1 patch, Daily PRN    melatonin 5 mg, Daily PRN    meloxicam (MOBIC) 15 mg, Daily    metoclopramide (Reglan) 5 mg tablet 1 tablet, Daily    miconazole (Micotin) 2 % cream 1 Application, As needed    montelukast (SINGULAIR) 10 mg, Daily PRN    naloxone (Narcan) 4 mg/0.1 mL nasal spray 1 spray, As needed    ondansetron (Zofran) 4 mg tablet 1 tablet, Every 6 hours PRN    oxybutynin XL (DITROPAN-XL) 10 mg, Daily    Ozempic 0.25 mg, Weekly    pantoprazole (PROTONIX) 40 mg, oral, Daily, Do not crush, chew, or split.    promethazine-DM (Phenergan-DM) 6.25-15 mg/5 mL syrup 5 mL, As needed    Pulmicort Flexhaler 90 mcg/actuation inhaler 1-2 puffs, 2 times daily RT     rOPINIRole (REQUIP) 0.5 mg, Nightly    rosuvastatin (Crestor) 20 mg tablet Take 1 tablet (20 mg) by mouth once daily.    Trelegy Ellipta 100-62.5-25 mcg blister with device 1 puff, Daily       Physical Exam:  General: No acute distress,  A&O x3, obese female skin: Warm and dry  Neck: JVD is not elevated  ENT: Moist mucous membranes no lesions appreciated  Pulmonary: CTAB  Cards: Regular rate rhythm, no murmurs gallops or rubs appreciated normal S1-S2  Abdomen: Soft nontender nondistended  Extremities: Swelling in the legs  Psych: Appropriate mood and affect     Last Labs:  CBC -  Lab Results   Component Value Date    WBC 7.4 09/05/2024    HGB 8.0 (L) 09/05/2024    HCT 26.2 (L) 09/05/2024    MCV 86 09/05/2024     (H) 09/05/2024       CMP -  Lab Results   Component Value Date    CALCIUM 8.7 09/05/2024    PROT 7.3 06/05/2024    ALBUMIN 4.1 06/05/2024    AST 17 06/05/2024    ALT 9 06/05/2024    ALKPHOS 93 06/05/2024    BILITOT 0.2 06/05/2024       LIPID PANEL -   Lab Results   Component Value Date    CHOL 208 (H) 04/29/2024    TRIG 154 (H) 04/29/2024    HDL 44.7 04/29/2024    CHHDL 4.7 04/29/2024    VLDL 31 04/29/2024    NHDL 163 (H) 04/29/2024       RENAL FUNCTION PANEL -   Lab Results   Component Value Date    GLUCOSE 99 09/05/2024     09/05/2024    K 3.9 09/05/2024     09/05/2024    CO2 28 09/05/2024    ANIONGAP 12 09/05/2024    BUN 21 09/05/2024    CREATININE 1.69 (H) 09/05/2024    CALCIUM 8.7 09/05/2024    ALBUMIN 4.1 06/05/2024        Lab Results   Component Value Date    BNP 92 06/05/2024    HGBA1C 6.8 (H) 03/14/2024       Last Cardiology Tests:  ECG:  Encounter Date: 06/05/24   ECG 12 lead   Result Value    Ventricular Rate 71    Atrial Rate 71    ID Interval 178    QRS Duration 95    QT Interval 402    QTC Calculation(Bazett) 437    P Axis 31    R Axis 7    T Axis -3    QRS Count 11    Q Onset 249    T Offset 450    QTC Fredericia 425    Narrative    Sinus rhythm  Left ventricular  hypertrophy  Borderline T abnormalities, diffuse leads    See ED provider note for full interpretation and clinical correlation  Confirmed by Erika Webber ([id number]) on 6/14/2024 12:51:37 PM        Echo:  No results found for this or any previous visit from the past 1095 days.       Ejection Fractions:  EF   Date/Time Value Ref Range Status   03/15/2024 01:38 PM 49 %      Assessment and Plan    1.  PVCs/paroxysmal SVT: Confirmed with most recent Holter monitor.  Echo showed normal ejection fraction with LVH.  Baseline EKG reviewed.  We are switching metoprolol to carvedilol to improve blood pressure management and additional beta-blockade.  Patient is off of amitriptyline.  She uses Cardizem as needed for breakthrough ectopic events.     2.  Chest pain: Atypical: Pain is different in association with food or lupus flare.  She has had previous stress test 1 year ago although this was a regadenoson nuclear stress test.  Her calcium score is 0 which is limited in the context of chronic autoimmune disease.  Risk factors include hypertension, hyperlipidemia and type 2 diabetes.  Our plan is to check fasting lipid panel.  In light of ongoing abdominal discomfort and likely need for gallbladder intervention.  Coronary CTA did not show evidence of obstructive CAD.  She also was treated with a short course of colchicine which had not no impact on her symptoms.  Suspect symptoms are related to PVCs as described above.     3.  HFpEF: Chronic.  Volume status appears to be relatively controlled with Jardiance 10 mg daily and chlorthalidone.  Volume status increase in the setting of dietary indiscretion.  Take Lasix 20 mg to take as needed for weight gain or orthopnea symptoms.       4.  COPD/asthma: Management as per pulmonology.  Will try to limit exposure to beta-blockers in this context.     5.  Hypertension: LVH appreciated with echocardiogram.  Better controlled with carvedilol 25 mg twice a day.     6.  Microcytic  anemia:  Patient is complaining of fatigue and tiredness.  Add ferrous sulfate and recheck iron studies in 1 month.     7.  Tobacco consumption: Precontemplative.  Advised smoking cessation.     8.  Hyperlipidemia: LDL is 133.  She is on rosuvastatin 20 mg daily.  Reassess at next appointment.     Follow-up in 6 months  Ferrous sulfate 325 mg daily  Take oral Lasix 20 mg as needed  Check iron studies within 1 month     (This note was generated with voice recognition software and may contain errors including spelling, grammar, syntax and missed recognition of what was dictated, of which may not have been fully corrected)      Osito Browning MD PhD

## 2024-11-14 ENCOUNTER — TELEPHONE (OUTPATIENT)
Dept: RHEUMATOLOGY | Facility: CLINIC | Age: 59
End: 2024-11-14
Payer: COMMERCIAL

## 2024-11-14 NOTE — TELEPHONE ENCOUNTER
Patient has said her symptoms are not getting better since she took the Benlysta. They are getting worse. Please advise

## 2024-11-15 ENCOUNTER — APPOINTMENT (OUTPATIENT)
Dept: HEMATOLOGY/ONCOLOGY | Facility: CLINIC | Age: 59
End: 2024-11-15
Payer: COMMERCIAL

## 2024-12-06 ENCOUNTER — APPOINTMENT (OUTPATIENT)
Dept: RHEUMATOLOGY | Facility: CLINIC | Age: 59
End: 2024-12-06
Payer: COMMERCIAL

## 2024-12-27 ENCOUNTER — OFFICE VISIT (OUTPATIENT)
Dept: RHEUMATOLOGY | Facility: CLINIC | Age: 59
End: 2024-12-27
Payer: COMMERCIAL

## 2024-12-27 VITALS
DIASTOLIC BLOOD PRESSURE: 91 MMHG | BODY MASS INDEX: 47.74 KG/M2 | SYSTOLIC BLOOD PRESSURE: 150 MMHG | HEART RATE: 80 BPM | OXYGEN SATURATION: 91 % | WEIGHT: 261 LBS | TEMPERATURE: 98.2 F

## 2024-12-27 DIAGNOSIS — M19.90 INFLAMMATORY ARTHRITIS: ICD-10-CM

## 2024-12-27 DIAGNOSIS — R76.8 ANA POSITIVE: Primary | ICD-10-CM

## 2024-12-27 RX ORDER — COLCHICINE 0.6 MG/1
0.6 TABLET ORAL DAILY
Qty: 30 TABLET | Refills: 0 | Status: SHIPPED | OUTPATIENT
Start: 2024-12-27 | End: 2025-01-26

## 2024-12-27 RX ORDER — METHYLPREDNISOLONE ACETATE 40 MG/ML
20 INJECTION, SUSPENSION INTRA-ARTICULAR; INTRALESIONAL; INTRAMUSCULAR; SOFT TISSUE ONCE
Status: COMPLETED | OUTPATIENT
Start: 2024-12-27 | End: 2024-12-27

## 2024-12-27 ASSESSMENT — PAIN SCALES - GENERAL: PAINLEVEL_OUTOF10: 10-WORST PAIN EVER
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
## 2024-12-30 DIAGNOSIS — M32.9 SYSTEMIC LUPUS ERYTHEMATOSUS, UNSPECIFIED SLE TYPE, UNSPECIFIED ORGAN INVOLVEMENT STATUS (MULTI): Primary | ICD-10-CM

## 2024-12-30 RX ORDER — EPINEPHRINE 0.3 MG/.3ML
0.3 INJECTION SUBCUTANEOUS EVERY 5 MIN PRN
OUTPATIENT
Start: 2025-01-02

## 2024-12-30 RX ORDER — ALBUTEROL SULFATE 0.83 MG/ML
3 SOLUTION RESPIRATORY (INHALATION) AS NEEDED
OUTPATIENT
Start: 2025-01-02

## 2024-12-30 RX ORDER — DIPHENHYDRAMINE HYDROCHLORIDE 50 MG/ML
50 INJECTION INTRAMUSCULAR; INTRAVENOUS AS NEEDED
OUTPATIENT
Start: 2025-01-02

## 2024-12-30 RX ORDER — FAMOTIDINE 10 MG/ML
20 INJECTION INTRAVENOUS ONCE AS NEEDED
OUTPATIENT
Start: 2025-01-02

## 2024-12-30 NOTE — PROGRESS NOTES
Per Dr. Ward- Apply for Saphnelo 300 mg intravenously every 4 weeks for treatment of lupus.   Patient previously has received Benlysta at [facility], will discontinue Benlysta orders and order Saphnelo for [facility]

## 2025-01-14 ENCOUNTER — LAB (OUTPATIENT)
Dept: LAB | Facility: CLINIC | Age: 60
End: 2025-01-14
Payer: COMMERCIAL

## 2025-01-14 ENCOUNTER — INFUSION (OUTPATIENT)
Dept: HEMATOLOGY/ONCOLOGY | Facility: CLINIC | Age: 60
End: 2025-01-14
Payer: COMMERCIAL

## 2025-01-14 VITALS
HEART RATE: 81 BPM | BODY MASS INDEX: 47.06 KG/M2 | OXYGEN SATURATION: 100 % | HEIGHT: 62 IN | RESPIRATION RATE: 18 BRPM | WEIGHT: 255.73 LBS | TEMPERATURE: 96.6 F | DIASTOLIC BLOOD PRESSURE: 85 MMHG | SYSTOLIC BLOOD PRESSURE: 175 MMHG

## 2025-01-14 DIAGNOSIS — D50.9 MICROCYTIC ANEMIA: ICD-10-CM

## 2025-01-14 DIAGNOSIS — I50.9 CHRONIC HEART FAILURE, UNSPECIFIED HEART FAILURE TYPE: ICD-10-CM

## 2025-01-14 DIAGNOSIS — M32.9 SYSTEMIC LUPUS ERYTHEMATOSUS, UNSPECIFIED SLE TYPE, UNSPECIFIED ORGAN INVOLVEMENT STATUS (MULTI): ICD-10-CM

## 2025-01-14 DIAGNOSIS — I10 ESSENTIAL HYPERTENSION: Chronic | ICD-10-CM

## 2025-01-14 DIAGNOSIS — I27.20 PULMONARY HYPERTENSION (MULTI): ICD-10-CM

## 2025-01-14 DIAGNOSIS — I11.0 HYPERTENSIVE HEART DISEASE WITH CHRONIC DIASTOLIC CONGESTIVE HEART FAILURE: ICD-10-CM

## 2025-01-14 DIAGNOSIS — R00.2 PALPITATIONS: ICD-10-CM

## 2025-01-14 DIAGNOSIS — I50.32 HYPERTENSIVE HEART DISEASE WITH CHRONIC DIASTOLIC CONGESTIVE HEART FAILURE: ICD-10-CM

## 2025-01-14 DIAGNOSIS — I47.10 NONSUSTAINED PAROXYSMAL SUPRAVENTRICULAR TACHYCARDIA (CMS-HCC): ICD-10-CM

## 2025-01-14 DIAGNOSIS — R07.89 CHEST DISCOMFORT: ICD-10-CM

## 2025-01-14 DIAGNOSIS — I49.3 PVC (PREMATURE VENTRICULAR CONTRACTION): ICD-10-CM

## 2025-01-14 LAB
ALBUMIN SERPL BCP-MCNC: 4.1 G/DL (ref 3.4–5)
ALP SERPL-CCNC: 118 U/L (ref 33–136)
ALT SERPL W P-5'-P-CCNC: 7 U/L (ref 7–45)
ANION GAP SERPL CALC-SCNC: 13 MMOL/L (ref 10–20)
AST SERPL W P-5'-P-CCNC: 11 U/L (ref 9–39)
BASOPHILS # BLD AUTO: 0.04 X10*3/UL (ref 0–0.1)
BASOPHILS NFR BLD AUTO: 0.5 %
BILIRUB SERPL-MCNC: 0.3 MG/DL (ref 0–1.2)
BUN SERPL-MCNC: 11 MG/DL (ref 6–23)
BURR CELLS BLD QL SMEAR: NORMAL
CALCIUM SERPL-MCNC: 9.1 MG/DL (ref 8.6–10.3)
CHLORIDE SERPL-SCNC: 107 MMOL/L (ref 98–107)
CO2 SERPL-SCNC: 25 MMOL/L (ref 21–32)
CREAT SERPL-MCNC: 0.82 MG/DL (ref 0.5–1.05)
CRP SERPL-MCNC: 1.08 MG/DL
DACRYOCYTES BLD QL SMEAR: NORMAL
EGFRCR SERPLBLD CKD-EPI 2021: 82 ML/MIN/1.73M*2
EOSINOPHIL # BLD AUTO: 0.06 X10*3/UL (ref 0–0.7)
EOSINOPHIL NFR BLD AUTO: 0.7 %
ERYTHROCYTE [DISTWIDTH] IN BLOOD BY AUTOMATED COUNT: 21 % (ref 11.5–14.5)
GLUCOSE SERPL-MCNC: 88 MG/DL (ref 74–99)
HCT VFR BLD AUTO: 37.4 % (ref 36–46)
HGB BLD-MCNC: 11.4 G/DL (ref 12–16)
IMM GRANULOCYTES # BLD AUTO: 0.03 X10*3/UL (ref 0–0.7)
IMM GRANULOCYTES NFR BLD AUTO: 0.4 % (ref 0–0.9)
IRON SATN MFR SERPL: 13 % (ref 25–45)
IRON SERPL-MCNC: 51 UG/DL (ref 35–150)
LYMPHOCYTES # BLD AUTO: 3.05 X10*3/UL (ref 1.2–4.8)
LYMPHOCYTES NFR BLD AUTO: 35.6 %
MCH RBC QN AUTO: 22.5 PG (ref 26–34)
MCHC RBC AUTO-ENTMCNC: 30.5 G/DL (ref 32–36)
MCV RBC AUTO: 74 FL (ref 80–100)
MONOCYTES # BLD AUTO: 0.55 X10*3/UL (ref 0.1–1)
MONOCYTES NFR BLD AUTO: 6.4 %
NEUTROPHILS # BLD AUTO: 4.84 X10*3/UL (ref 1.2–7.7)
NEUTROPHILS NFR BLD AUTO: 56.4 %
NRBC BLD-RTO: 0 /100 WBCS (ref 0–0)
OVALOCYTES BLD QL SMEAR: NORMAL
PLATELET # BLD AUTO: 404 X10*3/UL (ref 150–450)
POTASSIUM SERPL-SCNC: 3.8 MMOL/L (ref 3.5–5.3)
PROT SERPL-MCNC: 7.3 G/DL (ref 6.4–8.2)
RBC # BLD AUTO: 5.06 X10*6/UL (ref 4–5.2)
RBC MORPH BLD: NORMAL
SCHISTOCYTES BLD QL SMEAR: NORMAL
SODIUM SERPL-SCNC: 141 MMOL/L (ref 136–145)
TARGETS BLD QL SMEAR: NORMAL
TIBC SERPL-MCNC: 392 UG/DL (ref 240–445)
UIBC SERPL-MCNC: 341 UG/DL (ref 110–370)
WBC # BLD AUTO: 8.6 X10*3/UL (ref 4.4–11.3)

## 2025-01-14 PROCEDURE — 96365 THER/PROPH/DIAG IV INF INIT: CPT | Mod: INF

## 2025-01-14 PROCEDURE — 2500000004 HC RX 250 GENERAL PHARMACY W/ HCPCS (ALT 636 FOR OP/ED): Mod: JZ,SE,TB | Performed by: INTERNAL MEDICINE

## 2025-01-14 PROCEDURE — 86140 C-REACTIVE PROTEIN: CPT

## 2025-01-14 PROCEDURE — 83540 ASSAY OF IRON: CPT

## 2025-01-14 PROCEDURE — 84075 ASSAY ALKALINE PHOSPHATASE: CPT

## 2025-01-14 PROCEDURE — 36415 COLL VENOUS BLD VENIPUNCTURE: CPT

## 2025-01-14 PROCEDURE — 85025 COMPLETE CBC W/AUTO DIFF WBC: CPT

## 2025-01-14 RX ORDER — DIPHENHYDRAMINE HYDROCHLORIDE 50 MG/ML
50 INJECTION INTRAMUSCULAR; INTRAVENOUS AS NEEDED
OUTPATIENT
Start: 2025-02-11

## 2025-01-14 RX ORDER — FAMOTIDINE 10 MG/ML
20 INJECTION INTRAVENOUS ONCE AS NEEDED
Status: DISCONTINUED | OUTPATIENT
Start: 2025-01-14 | End: 2025-01-14 | Stop reason: HOSPADM

## 2025-01-14 RX ORDER — EPINEPHRINE 0.3 MG/.3ML
0.3 INJECTION SUBCUTANEOUS EVERY 5 MIN PRN
OUTPATIENT
Start: 2025-02-11

## 2025-01-14 RX ORDER — DIPHENHYDRAMINE HYDROCHLORIDE 50 MG/ML
50 INJECTION INTRAMUSCULAR; INTRAVENOUS AS NEEDED
Status: DISCONTINUED | OUTPATIENT
Start: 2025-01-14 | End: 2025-01-14 | Stop reason: HOSPADM

## 2025-01-14 RX ORDER — ALBUTEROL SULFATE 0.83 MG/ML
3 SOLUTION RESPIRATORY (INHALATION) AS NEEDED
Status: DISCONTINUED | OUTPATIENT
Start: 2025-01-14 | End: 2025-01-14 | Stop reason: HOSPADM

## 2025-01-14 RX ORDER — EPINEPHRINE 0.3 MG/.3ML
0.3 INJECTION SUBCUTANEOUS EVERY 5 MIN PRN
Status: DISCONTINUED | OUTPATIENT
Start: 2025-01-14 | End: 2025-01-14 | Stop reason: HOSPADM

## 2025-01-14 RX ORDER — FAMOTIDINE 10 MG/ML
20 INJECTION INTRAVENOUS ONCE AS NEEDED
OUTPATIENT
Start: 2025-02-11

## 2025-01-14 RX ORDER — ALBUTEROL SULFATE 0.83 MG/ML
3 SOLUTION RESPIRATORY (INHALATION) AS NEEDED
OUTPATIENT
Start: 2025-02-11

## 2025-01-14 RX ADMIN — ANIFROLUMAB 300 MG: 300 INJECTION, SOLUTION INTRAVENOUS at 10:24

## 2025-01-14 ASSESSMENT — PAIN SCALES - GENERAL: PAINLEVEL_OUTOF10: 10-WORST PAIN EVER

## 2025-01-15 ENCOUNTER — TELEPHONE (OUTPATIENT)
Dept: CARDIOLOGY | Facility: CLINIC | Age: 60
End: 2025-01-15

## 2025-01-24 ENCOUNTER — OFFICE VISIT (OUTPATIENT)
Dept: CARDIOLOGY | Facility: CLINIC | Age: 60
End: 2025-01-24
Payer: COMMERCIAL

## 2025-01-24 VITALS
DIASTOLIC BLOOD PRESSURE: 88 MMHG | HEIGHT: 62 IN | BODY MASS INDEX: 46.3 KG/M2 | HEART RATE: 100 BPM | SYSTOLIC BLOOD PRESSURE: 144 MMHG | OXYGEN SATURATION: 95 % | WEIGHT: 251.6 LBS

## 2025-01-24 DIAGNOSIS — D50.9 IRON DEFICIENCY ANEMIA, UNSPECIFIED IRON DEFICIENCY ANEMIA TYPE: Primary | ICD-10-CM

## 2025-01-24 PROCEDURE — 3077F SYST BP >= 140 MM HG: CPT | Performed by: PHYSICIAN ASSISTANT

## 2025-01-24 PROCEDURE — 3008F BODY MASS INDEX DOCD: CPT | Performed by: PHYSICIAN ASSISTANT

## 2025-01-24 PROCEDURE — 99214 OFFICE O/P EST MOD 30 MIN: CPT | Performed by: PHYSICIAN ASSISTANT

## 2025-01-24 PROCEDURE — 3079F DIAST BP 80-89 MM HG: CPT | Performed by: PHYSICIAN ASSISTANT

## 2025-01-24 NOTE — PROGRESS NOTES
Chief Complaint:   lab results     History Of Present Illness:    1/24/25  Iron studies display essential normalization of iron deficiency anemia.  Patient reports intermittent episodic palpitations consistent with documented nonsustained SVT noted on recent extended Holter monitor.    6/13/24  Patient recently presented to ED once again with complaints of atypical chest pain, acute ischemic workup was negative including negative serial troponin levels, and no ischemic findings on EKG.  Patient reports persistent episodic palpitations and tachycardic pulse rates likely correlating with previously noted, brief episodes of nonsustained SVT vs atrial tachycardia.  Patient is tentatively scheduled for elective right knee TKA revision, for which she will be cleared without reservations from a cardiovascular standpoint.  Patient denies  dyspnea on exertion, shortness of breath at rest, diaphoresis, nausea/vomiting, back pain, headache, lightheadedness, dizziness, syncope or presyncopal episodes, active bleeding signs or symptoms, excessive weight gain, muscle or joint pain, claudication.    3/21/24  Josemanuel Reinoso is a 59 y.o. female presenting with chronic and recurrent atypical chest pain.  Patient was recently hospitalized with these symptoms, acute ischemia was ruled out, therefore she underwent coronary CTA displaying normal coronary anatomy without evidence of underlying CAD.  Previously patient underwent LHC with Dr. Pearson in 2017 displaying normal coronary anatomy at that time as well.  Chest pain is somewhat pleuritic in nature, and nearly constant.  Due to patient's underlying autoimmunity and diagnosis of SLE I do feel this may represent symptoms of pericarditis.  Results of all lab tests, cardiac imaging, and coronary evaluation discussed with patient on exam today.     Last Recorded Vitals:  Vitals:    01/24/25 1045   BP: 144/88   BP Location: Left arm   Patient Position: Sitting   BP Cuff Size: Large adult  "  Pulse: 100   SpO2: 95%   Weight: 114 kg (251 lb 9.6 oz)   Height: 1.575 m (5' 2\")       Past Medical History:  She has a past medical history of Personal history of other diseases of the circulatory system and Personal history of other endocrine, nutritional and metabolic disease.    Past Surgical History:  She has a past surgical history that includes MR angio head wo IV contrast (7/14/2019); MR angio neck wo IV contrast (7/14/2019); and MR angio head wo IV contrast (7/14/2019).      Social History:  She reports that she has been smoking cigarettes. She has never been exposed to tobacco smoke. She has never used smokeless tobacco. She reports that she does not drink alcohol and does not use drugs.    Family History:  No family history on file.     Allergies:  Cephalexin, Azithromycin, Doxycycline, Erythromycin, Fluticasone propion-salmeterol, Ibuprofen, Lamotrigine, Metformin, Penicillins, Pregabalin, Estradiol, and Nickel    Outpatient Medications:  Current Outpatient Medications   Medication Instructions    albuterol 90 mcg/actuation inhaler inhale 1 to 2 puffs by mouth every 4 hours Inhalation    albuterol 2.5 mg, Every 6 hours PRN    aluminum hydrox-magnesium carb (Gaviscon)  mg/15 mL suspension oral suspension 16 mL, oral, Every 6 hours PRN    azelastine (Astelin) 137 mcg (0.1 %) nasal spray 1 spray, 2 times daily    bisacodyl (Dulcolax) 5 mg EC tablet take 1 tablet by mouth once daily if needed for constipation    capsaicin  cream (Capzasin-HP) 0.1 % cream cream 1 Application, Daily    carvedilol (COREG) 25 mg, oral, 2 times daily (morning and late afternoon)    cetirizine (ZYRTEC) 10 mg, Daily    cetirizine-pseudoephedrine (ZyrTEC-D) 5-120 mg 12 hr tablet 1 tablet, Daily RT    chlorthalidone (HYGROTON) 25 mg, oral, Daily RT    cholecalciferol (VITAMIN D3) 1,000 Units, Daily    ciclopirox (Penlac) 8 % solution 1 Application, Nightly    colchicine 0.6 mg, oral, Daily    cromolyn (Opticrom) 4 % " ophthalmic solution 1 drop, 4 times daily    cyanocobalamin (Vitamin B-12) 1,000 mcg tablet 1 tablet, Daily RT    dexlansoprazole (DEXILANT) 60 mg, oral, Daily, Do not crush or chew.    dilTIAZem (Cardizem) 30 mg immediate release tablet TAKE 1 TABLET BY MOUTH 2 TIMES A DAY AS NEEDED (PALPITATIONS).    docusate sodium (COLACE) 100 mg, 2 times daily PRN    empagliflozin (Jardiance) 10 mg 1 tablet, Daily with breakfast    famotidine (PEPCID) 40 mg, oral, Nightly    ferrous sulfate 325 (65 Fe) MG EC tablet 1 tablet, oral, Daily with breakfast, Do not crush, chew, or split.    fluticasone (Flonase) 50 mcg/actuation nasal spray 1 spray, 2 times daily PRN    fluticasone-umeclidin-vilanter (Trelegy Ellipta) 200-62.5-25 mcg blister with device 1 puff, Daily RT    FreeStyle Jasmyne 3 Sensor device 1 kit, As needed    furosemide (LASIX) 20 mg, oral, Daily PRN    HYDROcodone-acetaminophen (Norco) 5-325 mg tablet 1 tablet, Every 6 hours PRN    hydroxychloroquine (PLAQUENIL) 200 mg, oral, 2 times daily    hyoscyamine 0.125 mg disintegrating tablet DISSOLVE 1 TABLET UNDER THE TONGUE EVERY 4 HOURS AS NEEDED    ipratropium-albuteroL (Duo-Neb) 0.5-2.5 mg/3 mL nebulizer solution 3 mL, Every 6 hours PRN    lidocaine (Lidoderm) 5 % patch 1 patch, Daily PRN    melatonin 5 mg, Daily PRN    meloxicam (MOBIC) 15 mg, Daily    metoclopramide (Reglan) 5 mg tablet 1 tablet, Daily    miconazole (Micotin) 2 % cream 1 Application, As needed    montelukast (SINGULAIR) 10 mg, Daily PRN    naloxone (Narcan) 4 mg/0.1 mL nasal spray 1 spray, As needed    ondansetron (Zofran) 4 mg tablet 1 tablet, Every 6 hours PRN    oxybutynin XL (DITROPAN-XL) 10 mg, Daily    Ozempic 0.25 mg, Weekly    pantoprazole (PROTONIX) 40 mg, oral, Daily, Do not crush, chew, or split.    promethazine-DM (Phenergan-DM) 6.25-15 mg/5 mL syrup 5 mL, As needed    Pulmicort Flexhaler 90 mcg/actuation inhaler 1-2 puffs, 2 times daily RT    rOPINIRole (REQUIP) 0.5 mg, Nightly     rosuvastatin (Crestor) 20 mg tablet Take 1 tablet (20 mg) by mouth once daily.    Trelegy Ellipta 100-62.5-25 mcg blister with device 1 puff, Daily       Physical Exam:  Constitutional: awake and alert, oriented ×3, no apparent distress  Skin: warm, dry, good turgor no obvious lesions  Eyes: pupils equal, round, reactive to light, conjunctiva pink and noninjected, no discharge  HENT: normocephalic and atraumatic, mucous membranes moist, trachea midline with no masses/goiter  Cardiovascular: S1/S2 regular, no murmur no rubs/gallops, no carotid bruits, no JVD  Pulmonary: symmetrical chest expansion, lungs are clear to auscultation bilaterally, no wheezes/rales/rhonchi, normal effort  Abdomen: nontender, nondistended, active bowel sounds, no ascites  Extremities: no cyanosis, clubbing, no LE edema no lesions; palpable pedal pulses  Neurologic: cranial nerves II - XII grossly intact, stable gait, no tremor       Last Labs:  CBC -  Lab Results   Component Value Date    WBC 8.6 01/14/2025    HGB 11.4 (L) 01/14/2025    HCT 37.4 01/14/2025    MCV 74 (L) 01/14/2025     01/14/2025       CMP -  Lab Results   Component Value Date    CALCIUM 9.1 01/14/2025    PROT 7.3 01/14/2025    ALBUMIN 4.1 01/14/2025    AST 11 01/14/2025    ALT 7 01/14/2025    ALKPHOS 118 01/14/2025    BILITOT 0.3 01/14/2025       LIPID PANEL -   Lab Results   Component Value Date    CHOL 208 (H) 04/29/2024    TRIG 154 (H) 04/29/2024    HDL 44.7 04/29/2024    CHHDL 4.7 04/29/2024    VLDL 31 04/29/2024    NHDL 163 (H) 04/29/2024       RENAL FUNCTION PANEL -   Lab Results   Component Value Date    GLUCOSE 88 01/14/2025     01/14/2025    K 3.8 01/14/2025     01/14/2025    CO2 25 01/14/2025    ANIONGAP 13 01/14/2025    BUN 11 01/14/2025    CREATININE 0.82 01/14/2025    CALCIUM 9.1 01/14/2025    ALBUMIN 4.1 01/14/2025        Lab Results   Component Value Date    BNP 92 06/05/2024    HGBA1C 6.8 (H) 03/14/2024       Last Cardiology  Tests:  ECG:  ECG 12 Lead 03/14/2024 (Preliminary)      Echo:  Transthoracic Echo Complete 03/15/2024  1. Left ventricular systolic function is low normal with a 50-55% estimated ejection fraction.   2. There is concentric left ventricular hypertrophy.   3. RVSP within normal limits.    Ejection Fractions:  EF   Date/Time Value Ref Range Status   03/15/2024 01:38 PM 49 %        Cath:  No results found for this or any previous visit from the past 1095 days.      Stress Test:  NUCLEAR STRESS TEST 07/31/2023      Cardiac Imaging:  CT angio coronary arteries w C EVAL of cardiac structure morphology 03/15/2024  1. Right dominant system.  2. Study is limited by motion and misregistration artifact, however  there appears to be normal coronary anatomy without clear evidence of  atherosclerotic changes or stenotic disease.  3. Small hiatal hernia.    Assessment/Plan   Problem List Items Addressed This Visit             ICD-10-CM       Hematology and Neoplasia    Iron deficiency anemia - Primary D50.9    Relevant Orders    Referral to Gastroenterology       -No evidence of coronary disease on recent coronary CTA, [facility] in 2017 displaying no coronary disease    -Referral to GI regarding iron deficiency anemia    -F/U with Dr. Browning as previously scheduled    Jose Arias PA-C

## 2025-01-26 NOTE — H&P (VIEW-ONLY)
Subjective     History of Present Illness:   Josemanuel Reinoso is a 59 y.o. female who presents to GI clinic for evaluation of iron deficiency anemia. She was referred to us by Jose GOMEZ who last saw her on 1/24/2025.   She had evidence of significant iron deficiency anemia hemoglobin down to 8, this normalized up to 11.4 with iron. MCV severely low at 74  Patient denies any significant bleeding but said that her stools have been slightly dark.  No clear bleeding.  Her main complaint is fatigue.  She also had a knee surgery in August and uses the walker sometimes because of that.    She does have some nausea and occasional vomiting.  She said she was diagnosed with gastroparesis in the past but when she was seen by Dr. Suresh in the past repeat gastric emptying was normal.  She said that when she was on the Dexilant it was helping.      She has had an EGD and a colonoscopy in June 2023.  EGD showed gastritis biopsies negative for H. pylori and colonoscopy showed a polyp more than 1 cm recommendation to repeat in 3 years.    She was prescribed Ozempic but has not started taking it yet.      Review of Systems  Review of Systems   Constitutional: Negative.    HENT: Negative.     Eyes: Negative.    Respiratory: Negative.     Cardiovascular: Negative.    Gastrointestinal:         As in HPI    Endocrine: Negative.    Genitourinary: Negative.    Musculoskeletal: Negative.    Skin: Negative.    Neurological: Negative.    Psychiatric/Behavioral: Negative.         Past Medical History   has a past medical history of Personal history of other diseases of the circulatory system and Personal history of other endocrine, nutritional and metabolic disease.     Social History   reports that she has been smoking cigarettes. She has never been exposed to tobacco smoke. She has never used smokeless tobacco. She reports that she does not drink alcohol and does not use drugs.     Family History  family history is not on file.      Allergies  Allergies   Allergen Reactions    Cephalexin GI Upset and Swelling    Azithromycin GI Upset     c/o severe stomach burning    Doxycycline Diarrhea and Nausea/vomiting    Erythromycin GI Upset and Nausea/vomiting    Fluticasone Propion-Salmeterol Other     Sore throat / thrush     Ibuprofen GI Upset    Lamotrigine Swelling     Had bilateral swelling of feet and lower legs. The swelling subsided and is now gone 2 days after she stopped Lamictal    Metformin GI Upset    Penicillins Hives and Rash    Pregabalin Hives    Estradiol Itching and Rash    Nickel Rash       Medications  Current Outpatient Medications   Medication Instructions    albuterol 90 mcg/actuation inhaler inhale 1 to 2 puffs by mouth every 4 hours Inhalation    albuterol 2.5 mg, Every 6 hours PRN    aluminum hydrox-magnesium carb (Gaviscon)  mg/15 mL suspension oral suspension 16 mL, oral, Every 6 hours PRN    azelastine (Astelin) 137 mcg (0.1 %) nasal spray 1 spray, 2 times daily    bisacodyl (Dulcolax) 5 mg EC tablet take 1 tablet by mouth once daily if needed for constipation    capsaicin  cream (Capzasin-HP) 0.1 % cream cream 1 Application, Daily    carvedilol (COREG) 25 mg, oral, 2 times daily (morning and late afternoon)    cetirizine (ZYRTEC) 10 mg, Daily    cetirizine-pseudoephedrine (ZyrTEC-D) 5-120 mg 12 hr tablet 1 tablet, Daily RT    chlorthalidone (HYGROTON) 25 mg, oral, Daily RT    cholecalciferol (VITAMIN D3) 1,000 Units, Daily    ciclopirox (Penlac) 8 % solution 1 Application, Nightly    colchicine 0.6 mg, oral, Daily    cromolyn (Opticrom) 4 % ophthalmic solution 1 drop, 4 times daily    cyanocobalamin (Vitamin B-12) 1,000 mcg tablet 1 tablet, Daily RT    dexlansoprazole (DEXILANT) 60 mg, oral, Daily, Do not crush or chew.    dilTIAZem (Cardizem) 30 mg immediate release tablet TAKE 1 TABLET BY MOUTH 2 TIMES A DAY AS NEEDED (PALPITATIONS).    docusate sodium (COLACE) 100 mg, 2 times daily PRN    empagliflozin  (Jardiance) 10 mg 1 tablet, Daily with breakfast    famotidine (PEPCID) 40 mg, oral, Nightly    ferrous sulfate 325 (65 Fe) MG EC tablet 1 tablet, oral, Daily with breakfast, Do not crush, chew, or split.    fluticasone (Flonase) 50 mcg/actuation nasal spray 1 spray, 2 times daily PRN    fluticasone-umeclidin-vilanter (Trelegy Ellipta) 200-62.5-25 mcg blister with device 1 puff, Daily RT    FreeStyle Jasmyne 3 Sensor device 1 kit, As needed    furosemide (LASIX) 20 mg, oral, Daily PRN    HYDROcodone-acetaminophen (Norco) 5-325 mg tablet 1 tablet, Every 6 hours PRN    hydroxychloroquine (PLAQUENIL) 200 mg, oral, 2 times daily    hyoscyamine 0.125 mg disintegrating tablet DISSOLVE 1 TABLET UNDER THE TONGUE EVERY 4 HOURS AS NEEDED    ipratropium-albuteroL (Duo-Neb) 0.5-2.5 mg/3 mL nebulizer solution 3 mL, Every 6 hours PRN    lidocaine (Lidoderm) 5 % patch 1 patch, Daily PRN    melatonin 5 mg, Daily PRN    meloxicam (MOBIC) 15 mg, Daily    metoclopramide (Reglan) 5 mg tablet 1 tablet, Daily    miconazole (Micotin) 2 % cream 1 Application, As needed    montelukast (SINGULAIR) 10 mg, Daily PRN    naloxone (Narcan) 4 mg/0.1 mL nasal spray 1 spray, As needed    ondansetron (Zofran) 4 mg tablet 1 tablet, Every 6 hours PRN    oxybutynin XL (DITROPAN-XL) 10 mg, Daily    Ozempic 0.25 mg, Weekly    pantoprazole (PROTONIX) 40 mg, oral, Daily, Do not crush, chew, or split.    promethazine-DM (Phenergan-DM) 6.25-15 mg/5 mL syrup 5 mL, As needed    Pulmicort Flexhaler 90 mcg/actuation inhaler 1-2 puffs, 2 times daily RT    rOPINIRole (REQUIP) 0.5 mg, Nightly    rosuvastatin (Crestor) 20 mg tablet Take 1 tablet (20 mg) by mouth once daily.    Trelegy Ellipta 100-62.5-25 mcg blister with device 1 puff, Daily       Objective   There were no vitals taken for this visit.  Physical Exam  Vitals reviewed.   Constitutional:       Appearance: She is obese.   HENT:      Head: Normocephalic.      Comments: Mallampati classification 2.  Eyes:       Conjunctiva/sclera: Conjunctivae normal.      Pupils: Pupils are equal, round, and reactive to light.   Cardiovascular:      Rate and Rhythm: Normal rate and regular rhythm.      Pulses: Normal pulses.      Heart sounds: Normal heart sounds.   Pulmonary:      Effort: Pulmonary effort is normal.      Breath sounds: Normal breath sounds.   Abdominal:      General: Abdomen is flat. Bowel sounds are normal. There is no distension.      Palpations: Abdomen is soft.      Tenderness: There is no abdominal tenderness. There is no guarding or rebound.   Musculoskeletal:         General: Normal range of motion.   Skin:     General: Skin is warm and dry.   Neurological:      General: No focal deficit present.      Mental Status: She is alert and oriented to person, place, and time.               Assessment/Plan   Josemanuel Reinoso is a 59 y.o. female who presents to GI clinic for evaluation of Iron deficiency anemia.   She has iron deficiency anemia of unclear etiology.  Although she did have an EGD and colonoscopy in 2023 I think this needs to be repeated especially that she has significant anemia and a drop of her hemoglobin is new.  Differential diagnosis is wide.  Can include AVMs less likely to be malignancy.    Will schedule for EGD and colonoscopy.  She has a BMI of 46.8 however this is more central obesity, I did examine her airway, I think she will be safe to be done in [place].  She was recently evaluated by cardiology does not have any cardiac issues as well.    If endoscopy is negative then we will do a capsule endoscopy.  I will send her a prescription for Dexilant afterwards.          By signing my name below, I, Kristy Bland , Scribe attest that this documentation has been prepared under the direction and in the presence of Hesham Rice MD

## 2025-01-26 NOTE — PROGRESS NOTES
Subjective     History of Present Illness:   Josemanuel Reinoso is a 59 y.o. female who presents to GI clinic for evaluation of iron deficiency anemia. She was referred to us by Jose GOMEZ who last saw her on 1/24/2025. At that visit, her iron studies showed essential normalization of iron deficiency anemia.    Today, she       Review of Systems  Review of Systems    Past Medical History   has a past medical history of Personal history of other diseases of the circulatory system and Personal history of other endocrine, nutritional and metabolic disease.     Social History   reports that she has been smoking cigarettes. She has never been exposed to tobacco smoke. She has never used smokeless tobacco. She reports that she does not drink alcohol and does not use drugs.     Family History  family history is not on file.     Allergies  Allergies   Allergen Reactions    Cephalexin GI Upset and Swelling    Azithromycin GI Upset     c/o severe stomach burning    Doxycycline Diarrhea and Nausea/vomiting    Erythromycin GI Upset and Nausea/vomiting    Fluticasone Propion-Salmeterol Other     Sore throat / thrush     Ibuprofen GI Upset    Lamotrigine Swelling     Had bilateral swelling of feet and lower legs. The swelling subsided and is now gone 2 days after she stopped Lamictal    Metformin GI Upset    Penicillins Hives and Rash    Pregabalin Hives    Estradiol Itching and Rash    Nickel Rash       Medications  Current Outpatient Medications   Medication Instructions    albuterol 90 mcg/actuation inhaler inhale 1 to 2 puffs by mouth every 4 hours Inhalation    albuterol 2.5 mg, Every 6 hours PRN    aluminum hydrox-magnesium carb (Gaviscon)  mg/15 mL suspension oral suspension 16 mL, oral, Every 6 hours PRN    azelastine (Astelin) 137 mcg (0.1 %) nasal spray 1 spray, 2 times daily    bisacodyl (Dulcolax) 5 mg EC tablet take 1 tablet by mouth once daily if needed for constipation    capsaicin  cream (Capzasin-HP) 0.1 %  cream cream 1 Application, Daily    carvedilol (COREG) 25 mg, oral, 2 times daily (morning and late afternoon)    cetirizine (ZYRTEC) 10 mg, Daily    cetirizine-pseudoephedrine (ZyrTEC-D) 5-120 mg 12 hr tablet 1 tablet, Daily RT    chlorthalidone (HYGROTON) 25 mg, oral, Daily RT    cholecalciferol (VITAMIN D3) 1,000 Units, Daily    ciclopirox (Penlac) 8 % solution 1 Application, Nightly    colchicine 0.6 mg, oral, Daily    cromolyn (Opticrom) 4 % ophthalmic solution 1 drop, 4 times daily    cyanocobalamin (Vitamin B-12) 1,000 mcg tablet 1 tablet, Daily RT    dexlansoprazole (DEXILANT) 60 mg, oral, Daily, Do not crush or chew.    dilTIAZem (Cardizem) 30 mg immediate release tablet TAKE 1 TABLET BY MOUTH 2 TIMES A DAY AS NEEDED (PALPITATIONS).    docusate sodium (COLACE) 100 mg, 2 times daily PRN    empagliflozin (Jardiance) 10 mg 1 tablet, Daily with breakfast    famotidine (PEPCID) 40 mg, oral, Nightly    ferrous sulfate 325 (65 Fe) MG EC tablet 1 tablet, oral, Daily with breakfast, Do not crush, chew, or split.    fluticasone (Flonase) 50 mcg/actuation nasal spray 1 spray, 2 times daily PRN    fluticasone-umeclidin-vilanter (Trelegy Ellipta) 200-62.5-25 mcg blister with device 1 puff, Daily RT    FreeStyle Jasmyne 3 Sensor device 1 kit, As needed    furosemide (LASIX) 20 mg, oral, Daily PRN    HYDROcodone-acetaminophen (Norco) 5-325 mg tablet 1 tablet, Every 6 hours PRN    hydroxychloroquine (PLAQUENIL) 200 mg, oral, 2 times daily    hyoscyamine 0.125 mg disintegrating tablet DISSOLVE 1 TABLET UNDER THE TONGUE EVERY 4 HOURS AS NEEDED    ipratropium-albuteroL (Duo-Neb) 0.5-2.5 mg/3 mL nebulizer solution 3 mL, Every 6 hours PRN    lidocaine (Lidoderm) 5 % patch 1 patch, Daily PRN    melatonin 5 mg, Daily PRN    meloxicam (MOBIC) 15 mg, Daily    metoclopramide (Reglan) 5 mg tablet 1 tablet, Daily    miconazole (Micotin) 2 % cream 1 Application, As needed    montelukast (SINGULAIR) 10 mg, Daily PRN    naloxone (Narcan) 4  mg/0.1 mL nasal spray 1 spray, As needed    ondansetron (Zofran) 4 mg tablet 1 tablet, Every 6 hours PRN    oxybutynin XL (DITROPAN-XL) 10 mg, Daily    Ozempic 0.25 mg, Weekly    pantoprazole (PROTONIX) 40 mg, oral, Daily, Do not crush, chew, or split.    promethazine-DM (Phenergan-DM) 6.25-15 mg/5 mL syrup 5 mL, As needed    Pulmicort Flexhaler 90 mcg/actuation inhaler 1-2 puffs, 2 times daily RT    rOPINIRole (REQUIP) 0.5 mg, Nightly    rosuvastatin (Crestor) 20 mg tablet Take 1 tablet (20 mg) by mouth once daily.    Trelegy Ellipta 100-62.5-25 mcg blister with device 1 puff, Daily       Objective   There were no vitals taken for this visit.  Physical Exam  Constitutional:       Appearance: Normal appearance.   Cardiovascular:      Rate and Rhythm: Normal rate and regular rhythm.      Pulses: Normal pulses.      Heart sounds: Normal heart sounds.   Pulmonary:      Effort: Pulmonary effort is normal.      Breath sounds: Normal breath sounds.   Abdominal:      General: Abdomen is flat. Bowel sounds are normal. There is no distension.      Palpations: Abdomen is soft.      Tenderness: There is no abdominal tenderness.   Musculoskeletal:         General: Normal range of motion.   Neurological:      Mental Status: She is alert.             Assessment/Plan   Josemanuel Reinoso is a 59 y.o. female who presents to GI clinic for evaluation of Iron deficiency anemia.         By signing my name below, Kristy GRIMES Scribe attest that this documentation has been prepared under the direction and in the presence of Hesham Rice MD         Conjunctiva/sclera: Conjunctivae normal.      Pupils: Pupils are equal, round, and reactive to light.   Cardiovascular:      Rate and Rhythm: Normal rate and regular rhythm.      Pulses: Normal pulses.      Heart sounds: Normal heart sounds.   Pulmonary:      Effort: Pulmonary effort is normal.      Breath sounds: Normal breath sounds.   Abdominal:      General: Abdomen is flat. Bowel sounds are normal. There is no distension.      Palpations: Abdomen is soft.      Tenderness: There is no abdominal tenderness. There is no guarding or rebound.   Musculoskeletal:         General: Normal range of motion.   Skin:     General: Skin is warm and dry.   Neurological:      General: No focal deficit present.      Mental Status: She is alert and oriented to person, place, and time.               Assessment/Plan   Josemanuel Reinoso is a 59 y.o. female who presents to GI clinic for evaluation of Iron deficiency anemia.   She has iron deficiency anemia of unclear etiology.  Although she did have an EGD and colonoscopy in 2023 I think this needs to be repeated especially that she has significant anemia and a drop of her hemoglobin is new.  Differential diagnosis is wide.  Can include AVMs less likely to be malignancy.    Will schedule for EGD and colonoscopy.  She has a BMI of 46.8 however this is more central obesity, I did examine her airway, I think she will be safe to be done in [place].  She was recently evaluated by cardiology does not have any cardiac issues as well.    If endoscopy is negative then we will do a capsule endoscopy.  I will send her a prescription for Dexilant afterwards.          By signing my name below, I, Kristy Bland , Scribe attest that this documentation has been prepared under the direction and in the presence of Hesham Rice MD

## 2025-01-27 ENCOUNTER — APPOINTMENT (OUTPATIENT)
Dept: RHEUMATOLOGY | Facility: CLINIC | Age: 60
End: 2025-01-27
Payer: COMMERCIAL

## 2025-01-27 VITALS
WEIGHT: 254 LBS | TEMPERATURE: 98.1 F | HEIGHT: 62 IN | SYSTOLIC BLOOD PRESSURE: 144 MMHG | BODY MASS INDEX: 46.74 KG/M2 | DIASTOLIC BLOOD PRESSURE: 84 MMHG | HEART RATE: 71 BPM | OXYGEN SATURATION: 97 %

## 2025-01-27 DIAGNOSIS — M32.9 SYSTEMIC LUPUS ERYTHEMATOSUS, UNSPECIFIED SLE TYPE, UNSPECIFIED ORGAN INVOLVEMENT STATUS (MULTI): Primary | ICD-10-CM

## 2025-01-27 PROCEDURE — 99213 OFFICE O/P EST LOW 20 MIN: CPT | Performed by: INTERNAL MEDICINE

## 2025-01-27 PROCEDURE — 3077F SYST BP >= 140 MM HG: CPT | Performed by: INTERNAL MEDICINE

## 2025-01-27 PROCEDURE — 3008F BODY MASS INDEX DOCD: CPT | Performed by: INTERNAL MEDICINE

## 2025-01-27 PROCEDURE — 3079F DIAST BP 80-89 MM HG: CPT | Performed by: INTERNAL MEDICINE

## 2025-01-27 ASSESSMENT — PAIN SCALES - GENERAL: PAINLEVEL_OUTOF10: 9

## 2025-01-27 NOTE — PROGRESS NOTES
She had revision of right total knee arthroplasty on 8/19/2024.  She continues to have some pain and swelling in the right knee.  She has been doing stretching exercises.  She complains of pain in the lower abdomen for which she is planning to see a gastroenterologist.  She has been taking iron supplement to correct microcytic anemia.  She had ultrasound of the right upper abdomen 5/10/2024 that demonstrated gallbladder sludge without gallstones.  CT scan of the abdomen 6/5/2024 demonstrated fatty liver without evidence of biliary dilatation.   She notes having had greenish colored stool on the day prior to presentation.  She has not noted any foods that contribute to the abdominal symptoms.  She has had only 1 infusion of Saphnelo so far.    The lungs are clear to auscultation.  The heart has a regular rate and rhythm.  The abdomen shows some mild diffuse tenderness with a negative Najera sign and no palpable masses.  The extremities are without edema.  The musculoskeletal examination shows slightly increased warmth and swelling of the right knee.  There are no other joint effusions.    Laboratory (1/14/2025) WBC 8.6, hemoglobin 11.4, hematocrit 37.4, MCV 74, MCHC 30.5, platelets 404, CRP 1.08, BUN 11, creatinine 0.82, glucose 88, calcium 9.1, albumin 4.1, alkaline phosphatase 118, AST 11, ALT 7, iron 51, TIBC 392, saturation 13%.    She has type 2 diabetes mellitus, hypertension, GERD, irritable bowel syndrome, history of gastroparesis, microcytic anemia, calcium oxalate crystals in the urine, systemic lupus (positive RUSSELL, photosensitivity, arthralgia, history of facial rash), left thyroid nodule, right total knee arthroplasty with revision arthroplasty 8/19/2024, left total knee arthroplasty 8/11/2014, status post L4-S1 fusion.    She is to continue with Saphnelo 300 mg intravenously every 4 weeks and colchicine 0.6 mg once per day as needed for gout/pseudogout.  She is to return as next available office  appointment.

## 2025-01-28 ENCOUNTER — APPOINTMENT (OUTPATIENT)
Dept: GASTROENTEROLOGY | Facility: CLINIC | Age: 60
End: 2025-01-28
Payer: COMMERCIAL

## 2025-01-28 VITALS
DIASTOLIC BLOOD PRESSURE: 89 MMHG | SYSTOLIC BLOOD PRESSURE: 137 MMHG | HEART RATE: 77 BPM | HEIGHT: 62 IN | BODY MASS INDEX: 47.15 KG/M2 | WEIGHT: 256.2 LBS

## 2025-01-28 DIAGNOSIS — R11.14 BILIOUS VOMITING WITH NAUSEA: ICD-10-CM

## 2025-01-28 DIAGNOSIS — D50.0 IRON DEFICIENCY ANEMIA DUE TO CHRONIC BLOOD LOSS: Primary | ICD-10-CM

## 2025-01-28 PROCEDURE — 99244 OFF/OP CNSLTJ NEW/EST MOD 40: CPT | Performed by: INTERNAL MEDICINE

## 2025-01-28 PROCEDURE — 3008F BODY MASS INDEX DOCD: CPT | Performed by: INTERNAL MEDICINE

## 2025-01-28 PROCEDURE — 3075F SYST BP GE 130 - 139MM HG: CPT | Performed by: INTERNAL MEDICINE

## 2025-01-28 PROCEDURE — 3079F DIAST BP 80-89 MM HG: CPT | Performed by: INTERNAL MEDICINE

## 2025-01-28 RX ORDER — DILTIAZEM HYDROCHLORIDE 60 MG/1
2 TABLET, FILM COATED ORAL
COMMUNITY
Start: 2025-01-17

## 2025-01-28 RX ORDER — TIOTROPIUM BROMIDE INHALATION SPRAY 1.56 UG/1
2 SPRAY, METERED RESPIRATORY (INHALATION) DAILY
COMMUNITY
Start: 2025-01-21

## 2025-01-28 RX ORDER — SODIUM, POTASSIUM,MAG SULFATES 17.5-3.13G
1 SOLUTION, RECONSTITUTED, ORAL ORAL 2 TIMES DAILY
Qty: 2 EACH | Refills: 0 | Status: SHIPPED | OUTPATIENT
Start: 2025-01-28

## 2025-01-28 ASSESSMENT — ENCOUNTER SYMPTOMS
CARDIOVASCULAR NEGATIVE: 1
ROS GI COMMENTS: AS IN HPI
RESPIRATORY NEGATIVE: 1
CONSTITUTIONAL NEGATIVE: 1
MUSCULOSKELETAL NEGATIVE: 1
EYES NEGATIVE: 1
NEUROLOGICAL NEGATIVE: 1
ENDOCRINE NEGATIVE: 1
PSYCHIATRIC NEGATIVE: 1

## 2025-01-30 ENCOUNTER — ANESTHESIA EVENT (OUTPATIENT)
Dept: GASTROENTEROLOGY | Facility: EXTERNAL LOCATION | Age: 60
End: 2025-01-30

## 2025-02-05 ENCOUNTER — APPOINTMENT (OUTPATIENT)
Dept: CARDIOLOGY | Facility: CLINIC | Age: 60
End: 2025-02-05
Payer: COMMERCIAL

## 2025-02-06 ENCOUNTER — APPOINTMENT (OUTPATIENT)
Dept: GASTROENTEROLOGY | Facility: EXTERNAL LOCATION | Age: 60
End: 2025-02-06
Payer: COMMERCIAL

## 2025-02-06 ENCOUNTER — ANESTHESIA (OUTPATIENT)
Dept: GASTROENTEROLOGY | Facility: EXTERNAL LOCATION | Age: 60
End: 2025-02-06

## 2025-02-06 VITALS
TEMPERATURE: 98.2 F | OXYGEN SATURATION: 99 % | SYSTOLIC BLOOD PRESSURE: 147 MMHG | RESPIRATION RATE: 18 BRPM | DIASTOLIC BLOOD PRESSURE: 83 MMHG | HEART RATE: 129 BPM

## 2025-02-06 DIAGNOSIS — D50.0 IRON DEFICIENCY ANEMIA DUE TO CHRONIC BLOOD LOSS: ICD-10-CM

## 2025-02-06 PROCEDURE — 88305 TISSUE EXAM BY PATHOLOGIST: CPT | Mod: TC,ELYLAB | Performed by: INTERNAL MEDICINE

## 2025-02-06 PROCEDURE — 45385 COLONOSCOPY W/LESION REMOVAL: CPT | Performed by: INTERNAL MEDICINE

## 2025-02-06 PROCEDURE — 43239 EGD BIOPSY SINGLE/MULTIPLE: CPT | Performed by: INTERNAL MEDICINE

## 2025-02-06 RX ORDER — LIDOCAINE HYDROCHLORIDE 20 MG/ML
INJECTION, SOLUTION INFILTRATION; PERINEURAL AS NEEDED
Status: DISCONTINUED | OUTPATIENT
Start: 2025-02-06 | End: 2025-02-06

## 2025-02-06 RX ORDER — MIDAZOLAM HYDROCHLORIDE 1 MG/ML
INJECTION, SOLUTION INTRAMUSCULAR; INTRAVENOUS AS NEEDED
Status: DISCONTINUED | OUTPATIENT
Start: 2025-02-06 | End: 2025-02-06

## 2025-02-06 RX ORDER — SODIUM CHLORIDE 9 MG/ML
INJECTION, SOLUTION INTRAVENOUS CONTINUOUS PRN
Status: DISCONTINUED | OUTPATIENT
Start: 2025-02-06 | End: 2025-02-06

## 2025-02-06 RX ORDER — GLYCOPYRROLATE 0.2 MG/ML
INJECTION INTRAMUSCULAR; INTRAVENOUS AS NEEDED
Status: DISCONTINUED | OUTPATIENT
Start: 2025-02-06 | End: 2025-02-06

## 2025-02-06 RX ORDER — PROPOFOL 10 MG/ML
INJECTION, EMULSION INTRAVENOUS AS NEEDED
Status: DISCONTINUED | OUTPATIENT
Start: 2025-02-06 | End: 2025-02-06

## 2025-02-06 RX ADMIN — GLYCOPYRROLATE 0.2 MG: 0.2 INJECTION INTRAMUSCULAR; INTRAVENOUS at 10:49

## 2025-02-06 RX ADMIN — SODIUM CHLORIDE: 9 INJECTION, SOLUTION INTRAVENOUS at 10:49

## 2025-02-06 RX ADMIN — PROPOFOL 50 MG: 10 INJECTION, EMULSION INTRAVENOUS at 11:20

## 2025-02-06 RX ADMIN — PROPOFOL 50 MG: 10 INJECTION, EMULSION INTRAVENOUS at 11:06

## 2025-02-06 RX ADMIN — PROPOFOL 50 MG: 10 INJECTION, EMULSION INTRAVENOUS at 11:17

## 2025-02-06 RX ADMIN — LIDOCAINE HYDROCHLORIDE 3 ML: 20 INJECTION, SOLUTION INFILTRATION; PERINEURAL at 10:55

## 2025-02-06 RX ADMIN — PROPOFOL 50 MG: 10 INJECTION, EMULSION INTRAVENOUS at 11:15

## 2025-02-06 RX ADMIN — PROPOFOL 100 MG: 10 INJECTION, EMULSION INTRAVENOUS at 10:55

## 2025-02-06 RX ADMIN — MIDAZOLAM HYDROCHLORIDE 2 MG: 1 INJECTION, SOLUTION INTRAMUSCULAR; INTRAVENOUS at 10:49

## 2025-02-06 RX ADMIN — PROPOFOL 50 MG: 10 INJECTION, EMULSION INTRAVENOUS at 11:02

## 2025-02-06 RX ADMIN — PROPOFOL 50 MG: 10 INJECTION, EMULSION INTRAVENOUS at 11:10

## 2025-02-06 SDOH — HEALTH STABILITY: MENTAL HEALTH: CURRENT SMOKER: 1

## 2025-02-06 ASSESSMENT — PAIN - FUNCTIONAL ASSESSMENT
PAIN_FUNCTIONAL_ASSESSMENT: 0-10

## 2025-02-06 ASSESSMENT — PAIN SCALES - GENERAL
PAINLEVEL_OUTOF10: 0 - NO PAIN
PAIN_LEVEL: 0
PAINLEVEL_OUTOF10: 0 - NO PAIN
PAINLEVEL_OUTOF10: 0 - NO PAIN
PAINLEVEL_OUTOF10: 1

## 2025-02-06 ASSESSMENT — COLUMBIA-SUICIDE SEVERITY RATING SCALE - C-SSRS
6. HAVE YOU EVER DONE ANYTHING, STARTED TO DO ANYTHING, OR PREPARED TO DO ANYTHING TO END YOUR LIFE?: NO
2. HAVE YOU ACTUALLY HAD ANY THOUGHTS OF KILLING YOURSELF?: NO
1. IN THE PAST MONTH, HAVE YOU WISHED YOU WERE DEAD OR WISHED YOU COULD GO TO SLEEP AND NOT WAKE UP?: NO

## 2025-02-06 NOTE — INTERVAL H&P NOTE
H&P reviewed. The patient was examined and there are no changes to the H&P.    Adelineamapati 2  ASA 3

## 2025-02-06 NOTE — DISCHARGE INSTRUCTIONS

## 2025-02-06 NOTE — ANESTHESIA PREPROCEDURE EVALUATION
Patient: Josemanuel Reinoso    Procedure Information       Anesthesia Start Date/Time: 02/06/25 1049    Scheduled providers: Hesham Rice MD    Procedures:       COLONOSCOPY      EGD    Location: [facility]            Relevant Problems   Cardiac   (+) Atypical chest pain   (+) Essential hypertension   (+) Hyperlipidemia   (+) Nonsustained paroxysmal supraventricular tachycardia (CMS-HCC)   (+) PVC (premature ventricular contraction)      Pulmonary   (+) Asthma   (+) Chronic obstructive pulmonary disease (Multi)   (+) Exacerbation of asthma (HHS-HCC)   (+) Moderate persistent asthma without complication (HHS-HCC)   (+) Pneumonia   (+) Pulmonary hypertension (Multi)      Neuro   (+) Anxiety disorder   (+) Bipolar affective disorder, currently active (Multi)   (+) Cauda equina syndrome (Multi)   (+) Diabetic peripheral neuropathy (Multi)   (+) Lumbar radiculitis   (+) Lumbar radiculopathy      GI   (+) Gastroesophageal reflux disease   (+) Irritable bowel syndrome      Liver   (+) Liver lesion      Endocrine   (+) Diabetic peripheral neuropathy (Multi)   (+) Gastroparesis due to DM (Multi)   (+) 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) (Multi)   (+) Type 2 diabetes mellitus      Hematology   (+) Antiphospholipid syndrome (Multi)   (+) Iron deficiency anemia      Musculoskeletal   (+) Calcium pyrophosphate deposition disease   (+) Cervical spondylosis without myelopathy   (+) Degeneration of intervertebral disc of lumbar region   (+) Degenerative joint disease of both ankles and feet   (+) Fibromyalgia   (+) Osteoarthritis of right knee   (+) Primary localized osteoarthritis of pelvic region and thigh   (+) Spondylosis without myelopathy or radiculopathy, lumbosacral region      ID   (+) Candidiasis   (+) Dermatophytosis of nail   (+) Pneumonia       Clinical information reviewed:   Tobacco  Allergies  Meds   Med Hx  Surg Hx  OB Status  Fam Hx  Soc   Hx        NPO Detail:  NPO/Void  Status  Date of Last Liquid: 02/06/25  Time of Last Liquid: 0800  Date of Last Solid: 02/04/25         Physical Exam    Airway  Mallampati: II  TM distance: >3 FB  Neck ROM: full     Cardiovascular - normal exam  Rhythm: regular  Rate: normal     Dental - normal exam  (+) upper dentures     Pulmonary - normal exam  Breath sounds clear to auscultation     Abdominal - normal exam  (+) obese         Anesthesia Plan    History of general anesthesia?: yes  History of complications of general anesthesia?: no    ASA 3     MAC     The patient is a current smoker.  Patient was previously instructed to abstain from smoking on day of procedure.  Patient did not smoke on day of procedure.    intravenous induction   Anesthetic plan and risks discussed with patient.    Plan discussed with CRNA.

## 2025-02-06 NOTE — ANESTHESIA POSTPROCEDURE EVALUATION
Patient: Josemanuel Reinoso    Procedure Summary       Date: 02/06/25 Room / Location: [facility]    Anesthesia Start: 1049 Anesthesia Stop: 1126    Procedures:       COLONOSCOPY      EGD Diagnosis: Iron deficiency anemia due to chronic blood loss    Scheduled Providers: Hesham Rice MD Responsible Provider: SUMAN An    Anesthesia Type: MAC ASA Status: 3            Anesthesia Type: MAC    Vitals Value Taken Time   /112 `   Temp 36.7 02/06/25 1129   Pulse 130 02/06/25 1129   Resp 24 02/06/25 1129   SpO2 100 02/06/25 1129       Anesthesia Post Evaluation    Patient location during evaluation: bedside  Patient participation: complete - patient participated  Level of consciousness: awake  Pain score: 0  Pain management: adequate  Airway patency: patent  Cardiovascular status: acceptable  Respiratory status: acceptable  Hydration status: acceptable  Postoperative Nausea and Vomiting: none      There were no known notable events for this encounter.

## 2025-02-11 ENCOUNTER — INFUSION (OUTPATIENT)
Dept: HEMATOLOGY/ONCOLOGY | Facility: CLINIC | Age: 60
End: 2025-02-11
Payer: COMMERCIAL

## 2025-02-11 VITALS
TEMPERATURE: 96.8 F | HEART RATE: 83 BPM | OXYGEN SATURATION: 98 % | DIASTOLIC BLOOD PRESSURE: 84 MMHG | BODY MASS INDEX: 47.83 KG/M2 | RESPIRATION RATE: 21 BRPM | SYSTOLIC BLOOD PRESSURE: 178 MMHG | WEIGHT: 259.92 LBS | HEIGHT: 62 IN

## 2025-02-11 DIAGNOSIS — M32.9 SYSTEMIC LUPUS ERYTHEMATOSUS, UNSPECIFIED SLE TYPE, UNSPECIFIED ORGAN INVOLVEMENT STATUS (MULTI): ICD-10-CM

## 2025-02-11 PROCEDURE — 2500000004 HC RX 250 GENERAL PHARMACY W/ HCPCS (ALT 636 FOR OP/ED): Mod: JZ,SE | Performed by: INTERNAL MEDICINE

## 2025-02-11 PROCEDURE — 96365 THER/PROPH/DIAG IV INF INIT: CPT | Mod: INF

## 2025-02-11 RX ORDER — ALBUTEROL SULFATE 0.83 MG/ML
3 SOLUTION RESPIRATORY (INHALATION) AS NEEDED
Status: DISCONTINUED | OUTPATIENT
Start: 2025-02-11 | End: 2025-02-11 | Stop reason: HOSPADM

## 2025-02-11 RX ORDER — FAMOTIDINE 10 MG/ML
20 INJECTION INTRAVENOUS ONCE AS NEEDED
Status: DISCONTINUED | OUTPATIENT
Start: 2025-02-11 | End: 2025-02-11 | Stop reason: HOSPADM

## 2025-02-11 RX ORDER — FAMOTIDINE 10 MG/ML
20 INJECTION INTRAVENOUS ONCE AS NEEDED
OUTPATIENT
Start: 2025-03-11

## 2025-02-11 RX ORDER — EPINEPHRINE 0.3 MG/.3ML
0.3 INJECTION SUBCUTANEOUS EVERY 5 MIN PRN
Status: DISCONTINUED | OUTPATIENT
Start: 2025-02-11 | End: 2025-02-11 | Stop reason: HOSPADM

## 2025-02-11 RX ORDER — EPINEPHRINE 0.3 MG/.3ML
0.3 INJECTION SUBCUTANEOUS EVERY 5 MIN PRN
OUTPATIENT
Start: 2025-03-11

## 2025-02-11 RX ORDER — ALBUTEROL SULFATE 0.83 MG/ML
3 SOLUTION RESPIRATORY (INHALATION) AS NEEDED
OUTPATIENT
Start: 2025-03-11

## 2025-02-11 RX ORDER — DIPHENHYDRAMINE HYDROCHLORIDE 50 MG/ML
50 INJECTION INTRAMUSCULAR; INTRAVENOUS AS NEEDED
OUTPATIENT
Start: 2025-03-11

## 2025-02-11 RX ORDER — DIPHENHYDRAMINE HYDROCHLORIDE 50 MG/ML
50 INJECTION INTRAMUSCULAR; INTRAVENOUS AS NEEDED
Status: DISCONTINUED | OUTPATIENT
Start: 2025-02-11 | End: 2025-02-11 | Stop reason: HOSPADM

## 2025-02-11 RX ADMIN — ANIFROLUMAB 300 MG: 300 INJECTION, SOLUTION INTRAVENOUS at 11:41

## 2025-02-11 ASSESSMENT — PAIN SCALES - GENERAL: PAINLEVEL_OUTOF10: 0-NO PAIN

## 2025-02-13 LAB
LABORATORY COMMENT REPORT: NORMAL
PATH REPORT.FINAL DX SPEC: NORMAL
PATH REPORT.GROSS SPEC: NORMAL
PATH REPORT.MICROSCOPIC SPEC OTHER STN: NORMAL
PATH REPORT.RELEVANT HX SPEC: NORMAL
PATH REPORT.TOTAL CANCER: NORMAL

## 2025-02-19 ENCOUNTER — APPOINTMENT (OUTPATIENT)
Dept: GASTROENTEROLOGY | Facility: HOSPITAL | Age: 60
End: 2025-02-19
Payer: COMMERCIAL

## 2025-02-26 DIAGNOSIS — K21.9 GASTROESOPHAGEAL REFLUX DISEASE WITHOUT ESOPHAGITIS: ICD-10-CM

## 2025-02-26 RX ORDER — FAMOTIDINE 40 MG/1
40 TABLET, FILM COATED ORAL NIGHTLY
Qty: 30 TABLET | Refills: 11 | Status: SHIPPED | OUTPATIENT
Start: 2025-02-26

## 2025-03-04 ENCOUNTER — APPOINTMENT (OUTPATIENT)
Dept: GASTROENTEROLOGY | Facility: HOSPITAL | Age: 60
End: 2025-03-04
Payer: COMMERCIAL

## 2025-03-07 ENCOUNTER — TELEPHONE (OUTPATIENT)
Dept: GASTROENTEROLOGY | Facility: HOSPITAL | Age: 60
End: 2025-03-07
Payer: COMMERCIAL

## 2025-03-07 DIAGNOSIS — R11.14 BILIOUS VOMITING WITH NAUSEA: Primary | ICD-10-CM

## 2025-03-07 RX ORDER — ONDANSETRON 4 MG/1
4 TABLET, ORALLY DISINTEGRATING ORAL EVERY 8 HOURS PRN
Qty: 20 TABLET | Refills: 0 | Status: SHIPPED | OUTPATIENT
Start: 2025-03-07 | End: 2025-03-14

## 2025-03-07 NOTE — TELEPHONE ENCOUNTER
Patient called in stating that she is experiencing upper stomach pains and can't stop throwing up. She's requesting something to be sent to pharmacy [id number] - [place], [place] - [id number] AT [address]

## 2025-03-10 NOTE — PROGRESS NOTES
"Subjective     History of Present Illness:   Josemanuel Reinoso is a 59 y.o. female who presents to GI clinic for follow-up.  Urgency for anemia.  EGD and colonoscopy demonstrated no major abnormalities and plan was for capsule endoscopy. She was scheduled for a Pillcam on Mar 4, and we rescheduled her for Mar 11 d/t being in the ER for SOB and diagnosed w/pleuritis on Mar 3. She also went to the ED Mar 5 for ABD pain: left AMA, not wanting to wait for a bed and wanted to see her GI doc as an outpt, back to the ED Mar 6 for ABD Pain/Weakness and they found she had gastroparesis     Today, she notes epigastric abdominal pain which she describes as \"agonizing\". The pain is also associated with nausea and vomiting. She was told that she has gall bladder sludge and a hiatal hernia. This is not new for her. She takes dulcolax, carvedilol and  Protonix. She takes narcotic pain medications everyday for her fibromyalgia and osteoarthritis. She is also s/p back and knee surgery. She was told that she has gastroparesis by one of her doctors. She has yet to start Ozempic.   Said that her pain has been going on for a long time.  She was told at some point that she has gastroparesis and she was started on Reglan which she think it helps.  She continues to complain of agonizing pain in her lower abdomen.  She is insisting that this is related to the gallbladder because she was told she has sludge in the past.      Review of Systems  Review of Systems   Constitutional: Negative.    Eyes: Negative.    Respiratory: Negative.     Cardiovascular: Negative.    Gastrointestinal: Negative.    Endocrine: Negative.    Musculoskeletal: Negative.    Skin: Negative.    Neurological: Negative.    Hematological: Negative.      Past Medical History   has a past medical history of Asthma, DM (diabetes mellitus) (Multi), GERD (gastroesophageal reflux disease), Hyperlipidemia, Hypertension, Personal history of other diseases of the circulatory system, " and Personal history of other endocrine, nutritional and metabolic disease.     Social History   reports that she has been smoking cigarettes. She has never been exposed to tobacco smoke. She has never used smokeless tobacco. She reports that she does not drink alcohol and does not use drugs.     Family History  family history is not on file.     Allergies  Allergies   Allergen Reactions    Cephalexin GI Upset and Swelling    Azithromycin GI Upset     c/o severe stomach burning    Doxycycline Diarrhea and Nausea/vomiting    Erythromycin GI Upset and Nausea/vomiting    Fluticasone Propion-Salmeterol Other     Sore throat / thrush     Ibuprofen GI Upset    Lamotrigine Swelling     Had bilateral swelling of feet and lower legs. The swelling subsided and is now gone 2 days after she stopped Lamictal    Metformin GI Upset    Penicillins Hives and Rash    Pregabalin Hives    Estradiol Itching and Rash    Nickel Rash       Medications  Current Outpatient Medications   Medication Instructions    albuterol 90 mcg/actuation inhaler inhale 1 to 2 puffs by mouth every 4 hours Inhalation    albuterol 2.5 mg, Every 6 hours PRN    aluminum hydrox-magnesium carb (Gaviscon)  mg/15 mL suspension oral suspension 16 mL, oral, Every 6 hours PRN    azelastine (Astelin) 137 mcg (0.1 %) nasal spray 1 spray, 2 times daily    bisacodyl (Dulcolax) 5 mg EC tablet take 1 tablet by mouth once daily if needed for constipation    capsaicin  cream (Capzasin-HP) 0.1 % cream cream 1 Application, Daily    carvedilol (COREG) 25 mg, oral, 2 times daily (morning and late afternoon)    cetirizine (ZYRTEC) 10 mg, Daily    cetirizine-pseudoephedrine (ZyrTEC-D) 5-120 mg 12 hr tablet 1 tablet, Daily RT    chlorthalidone (HYGROTON) 25 mg, oral, Daily RT    cholecalciferol (VITAMIN D3) 1,000 Units, Daily    ciclopirox (Penlac) 8 % solution 1 Application, Nightly    cromolyn (Opticrom) 4 % ophthalmic solution 1 drop, 4 times daily    cyanocobalamin  (Vitamin B-12) 1,000 mcg tablet 1 tablet, Daily RT    dexlansoprazole (DEXILANT) 60 mg, oral, Daily, Do not crush or chew.    dilTIAZem (Cardizem) 30 mg immediate release tablet TAKE 1 TABLET BY MOUTH 2 TIMES A DAY AS NEEDED (PALPITATIONS).    docusate sodium (COLACE) 100 mg, 2 times daily PRN    empagliflozin (Jardiance) 10 mg 1 tablet, Daily with breakfast    famotidine (PEPCID) 40 mg, oral, Nightly    ferrous sulfate 325 (65 Fe) MG EC tablet 1 tablet, oral, Daily with breakfast, Do not crush, chew, or split.    fluticasone (Flonase) 50 mcg/actuation nasal spray 1 spray, 2 times daily PRN    fluticasone-umeclidin-vilanter (Trelegy Ellipta) 200-62.5-25 mcg blister with device 1 puff, Daily RT    FreeStyle Jasmyne 3 Sensor device 1 kit, As needed    furosemide (LASIX) 20 mg, oral, Daily PRN    HYDROcodone-acetaminophen (Norco) 5-325 mg tablet 1 tablet, Every 6 hours PRN    hydroxychloroquine (PLAQUENIL) 200 mg, oral, 2 times daily    hyoscyamine 0.125 mg disintegrating tablet DISSOLVE 1 TABLET UNDER THE TONGUE EVERY 4 HOURS AS NEEDED    ipratropium-albuteroL (Duo-Neb) 0.5-2.5 mg/3 mL nebulizer solution 3 mL, Every 6 hours PRN    lidocaine (Lidoderm) 5 % patch 1 patch, Daily PRN    melatonin 5 mg, Daily PRN    meloxicam (MOBIC) 15 mg, Daily    metoclopramide (Reglan) 5 mg tablet 1 tablet, Daily    miconazole (Micotin) 2 % cream 1 Application, As needed    montelukast (SINGULAIR) 10 mg, Daily PRN    naloxone (Narcan) 4 mg/0.1 mL nasal spray 1 spray, As needed    ondansetron (Zofran) 4 mg tablet 1 tablet, Every 6 hours PRN    ondansetron ODT (ZOFRAN-ODT) 4 mg, oral, Every 8 hours PRN    oxybutynin XL (DITROPAN-XL) 10 mg, Daily    Ozempic 0.25 mg, Weekly    pantoprazole (PROTONIX) 40 mg, oral, Daily, Do not crush, chew, or split.    promethazine-DM (Phenergan-DM) 6.25-15 mg/5 mL syrup 5 mL, As needed    Pulmicort Flexhaler 90 mcg/actuation inhaler 1-2 puffs, 2 times daily RT    rOPINIRole (REQUIP) 0.5 mg, Nightly     "rosuvastatin (Crestor) 20 mg tablet Take 1 tablet (20 mg) by mouth once daily.    Spiriva Respimat 1.25 mcg/actuation inhaler 2 puffs, Daily    Symbicort 80-4.5 mcg/actuation inhaler 2 puffs, 2 times daily RT    Trelegy Ellipta 100-62.5-25 mcg blister with device 1 puff, Daily       Objective   BP (!) 164/100 (BP Location: Right arm, Patient Position: Sitting)   Pulse 98   Ht 1.575 m (5' 2\")   Wt 117 kg (257 lb)   BMI 47.01 kg/m²   Physical Exam  Constitutional:       Appearance: Normal appearance.   HENT:      Head: Normocephalic and atraumatic.      Right Ear: Tympanic membrane normal.      Left Ear: Tympanic membrane normal.      Nose: Nose normal.      Mouth/Throat:      Mouth: Mucous membranes are moist.   Eyes:      Extraocular Movements: Extraocular movements intact.      Pupils: Pupils are equal, round, and reactive to light.   Cardiovascular:      Rate and Rhythm: Normal rate and regular rhythm.      Pulses: Normal pulses.   Pulmonary:      Effort: Pulmonary effort is normal.      Breath sounds: Normal breath sounds.   Abdominal:      General: Abdomen is flat. Bowel sounds are normal. There is no distension.      Palpations: Abdomen is soft.      Tenderness: There is no abdominal tenderness.   Musculoskeletal:         General: Normal range of motion.      Cervical back: Normal range of motion.   Skin:     General: Skin is warm.   Neurological:      General: No focal deficit present.      Mental Status: She is alert and oriented to person, place, and time.   Psychiatric:         Mood and Affect: Mood normal.         Assessment/Plan   Josemanuel Reinoso is a 59 y.o. female who presents to GI clinic for follow up.  She has multiple GI complaints that appear to be chronic for the most part.      #Epigastric abdominal pain  Today, she has epigastric abdominal pain and associated nausea and vomiting. She had a prior gastric emptying test which was normal. She is also on Percocet for pain management which is I " told her it can cause what appears to be gastroparesis although the gastric emptying test was normal.  . I am giving her a rx of Zofran to be taken as needed.  I told her I do not think she needs to take Reglan.      #Anemia  Her recent iron studies were normal and so I recommended that she stop taking the iron.   I am concerned that the capsule will be delayed so I do not think that that is necessary at this point we will cancel it.    #Gall bladder sludge  She did have sludge in her gallbladder however I am not sure that all of her symptoms are caused by gallbladder sludge.  , I am referring him to Dr Lassiter (surgery) for possible consideration of a HIDA scan as patient is interested in getting a possible cholecystectomy.       By signing my name below, IKristy Scribe attest that this documentation has been prepared under the direction and in the presence of Hesham Rice MD

## 2025-03-11 ENCOUNTER — OFFICE VISIT (OUTPATIENT)
Dept: GASTROENTEROLOGY | Facility: CLINIC | Age: 60
End: 2025-03-11
Payer: COMMERCIAL

## 2025-03-11 ENCOUNTER — APPOINTMENT (OUTPATIENT)
Dept: GASTROENTEROLOGY | Facility: HOSPITAL | Age: 60
End: 2025-03-11
Payer: COMMERCIAL

## 2025-03-11 VITALS
DIASTOLIC BLOOD PRESSURE: 100 MMHG | WEIGHT: 257 LBS | HEART RATE: 98 BPM | SYSTOLIC BLOOD PRESSURE: 164 MMHG | HEIGHT: 62 IN | BODY MASS INDEX: 47.29 KG/M2

## 2025-03-11 DIAGNOSIS — K21.9 GASTROESOPHAGEAL REFLUX DISEASE WITHOUT ESOPHAGITIS: Primary | ICD-10-CM

## 2025-03-11 DIAGNOSIS — K21.9 GASTROESOPHAGEAL REFLUX DISEASE WITHOUT ESOPHAGITIS: ICD-10-CM

## 2025-03-11 PROCEDURE — 3008F BODY MASS INDEX DOCD: CPT | Performed by: INTERNAL MEDICINE

## 2025-03-11 PROCEDURE — 3080F DIAST BP >= 90 MM HG: CPT | Performed by: INTERNAL MEDICINE

## 2025-03-11 PROCEDURE — 3077F SYST BP >= 140 MM HG: CPT | Performed by: INTERNAL MEDICINE

## 2025-03-11 PROCEDURE — 99214 OFFICE O/P EST MOD 30 MIN: CPT | Performed by: INTERNAL MEDICINE

## 2025-03-11 RX ORDER — DEXLANSOPRAZOLE 60 MG/1
60 CAPSULE, DELAYED RELEASE ORAL DAILY
Qty: 30 CAPSULE | Refills: 11 | Status: SHIPPED | OUTPATIENT
Start: 2025-03-11 | End: 2025-03-12

## 2025-03-11 RX ORDER — ONDANSETRON 4 MG/1
4 TABLET, FILM COATED ORAL 3 TIMES DAILY
Qty: 30 TABLET | Refills: 0 | Status: SHIPPED | OUTPATIENT
Start: 2025-03-11 | End: 2025-03-21

## 2025-03-11 ASSESSMENT — ENCOUNTER SYMPTOMS
CARDIOVASCULAR NEGATIVE: 1
HEMATOLOGIC/LYMPHATIC NEGATIVE: 1
ENDOCRINE NEGATIVE: 1
MUSCULOSKELETAL NEGATIVE: 1
GASTROINTESTINAL NEGATIVE: 1
RESPIRATORY NEGATIVE: 1
NEUROLOGICAL NEGATIVE: 1
CONSTITUTIONAL NEGATIVE: 1
EYES NEGATIVE: 1

## 2025-03-11 NOTE — PROGRESS NOTES
History Of Present Illness  HPI   The patient is a 59-year-old female who complains of a 30 to 40-year history of intermittent episodes of upper abdominal pain associated with nausea and vomiting.  The patient has been extensively evaluated by multiple gastroenterologists including at [facility] and .  She had a gallbladder ultrasound in May 2024 that showed sludge.  She went to the [facility] emergency room and had a CT abdomen/pelvis on 2025.  They recommended hospital admission and she declined.  She states that she gets episodes of severe, sharp upper abdominal pain (feels like labor pain) associated with nausea and vomiting which may last days at a time.  She feels as if her legs cannot move due to abdominal pressure with walking.  Her last episode persisted for a week, but is now improved.  The symptoms typically occur about once a month and are random in nature, unrelated to food or activity.  She has no specific food intolerance.  She had EGD/colonoscopy by Dr. Rice on 2025.    Past medical history: I spent 30 minutes reviewing notes, images, labs in electronic health records  Diabetes mellitus  Asthma  Hypertension  GERD  Hyperlipidemia  Fibromyalgia  Osteoarthritis  Chronic knee pain  Back operation in   Knee operation in , ,   Hysterectomy  2 abdominal hernia repairs   section x 3  Gastroparesis  Tachycardia  Obesity with BMI over 46    Past Medical History  She has a past medical history of Asthma, DM (diabetes mellitus) (Multi), GERD (gastroesophageal reflux disease), Hyperlipidemia, Hypertension, Personal history of other diseases of the circulatory system, and Personal history of other endocrine, nutritional and metabolic disease.    Surgical History  She has a past surgical history that includes MR angio head wo IV contrast (2019); MR angio neck wo IV contrast (2019); and MR angio head wo IV contrast (2019).    "  Allergies  Cephalexin, Azithromycin, Doxycycline, Erythromycin, Fluticasone propion-salmeterol, Ibuprofen, Lamotrigine, Metformin, Penicillins, Pregabalin, Estradiol, and Nickel    Social History  She reports that she has been smoking cigarettes. She has never been exposed to tobacco smoke. She has never used smokeless tobacco. She reports that she does not drink alcohol and does not use drugs.    Family History  No family history on file.    Review of Systems  Review of Systems:  Constitutional:  no fever, no chills, no significant weight change  Neurological: No history of CVA or seizure disorder  Eyes: No pain, no recent visual change  ENT:  No recent hearing loss  Neck: No pain  Cardiovascular: Tachycardia.  No chest pain, no other history of cardiac disease such as myocardial infarction or arrhythmia or congestive heart failure  Pulmonary: Asthma.  No shortness of breath, no other history of pulmonary disease such as pneumonia or COPD  Breast: No history of breast disease or breast mass  Gastrointestinal:   no abdominal pain, no nausea or vomiting, no constipation or diarrhea or blood in the stool.  No history of ulcers.  No liver, gallbladder or pancreas disease.  No intestinal disorder.  Genitourinary: No hematuria or dysuria, no kidney disease  Musculoskeletal:  no arthralgia, no muscle or bone pain  Integumentary:  no rash  Psychiatric:  No anxiety or depression  Endocrine: Diabetes mellitus  Hematologic/Lymphatic: No easy bruising or bleeding    Last Recorded Vitals  Temperature 36.4 °C (97.6 °F), temperature source Temporal, height 1.575 m (5' 2\"), weight 116 kg (256 lb 6.4 oz).    Physical Exam  Constitutional: Well-developed, well-nourished, obese, alert and oriented, no acute distress  Skin: Warm and dry, no lesions, no rashes, no jaundice  HEENT: Normocephalic, atraumatic, EOMI, no scleral icterus, eyes have no redness or swelling or discharge, external inspection of ears and nose is normal, mucous " membranes moist  Neck: Soft, nontender, no mass or adenopathy  Cardiac: Regular rate and rhythm, no murmur  Chest: Patent airway, clear to auscultation, normal breath sounds with good chest expansion, no wheezes or rales or rhonchi noted, thorax symmetric  Abdomen: Nondistended, obese, positive bowel sounds, soft, mild upper abdominal tenderness, no lower abdominal tenderness, no mass  Rectal: Not performed  Extremities: No injury, moderate bilateral lower extremity edema, no calf tenderness  Lymphatic: No cervical adenopathy  Neurological: Alert and oriented x3, intact sensory and motor function, no obvious focal neurologic abnormalities  Psychological: Appropriate mood and behavior  Examination and discussion chaperoned by Agustina Lockwood    Relevant Results  Labs from March 6, 2025: WBC 6.5, hemoglobin 12.5, platelet 385  BMP normal.  TB 0.2, alk phos 130, AST 10, ALT 7.  Lipase 20.      I reviewed the gallbladder ultrasound report and images from May 11, 2024:  IMPRESSION:  1. Findings of hepatic steatosis.  2. Gallbladder sludge.      I reviewed the CT abdomen/pelvis report from [facility] from March 6, 2025, the images are not available at this time:  CT abdomen pelvis w IV contrast  Order: [id number]  RESULT:   Liver: No mass.   Biliary: No bile duct dilation.  No calcified stones or gallbladder   dilation.   Spleen: No mass. No splenomegaly.   Pancreas: No mass or duct dilation.   Adrenals: No mass.   Kidneys: No mass, calculus or hydronephrosis.   GI tract: No dilation or wall thickening. Normal appendix.   Lymph nodes: No abdominal or pelvic lymphadenopathy.   Mesentery/Peritoneum: No ascites or mass.   Retroperitoneum: No mass.   Vasculature:    - Abdominal aorta and iliac arteries: Minimal atherosclerotic   calcification without aneurysm.    - Celiac and SMA: Patent without stenosis.    - Portal venous system (SMV, splenic vein, portal vein and branches):   Patent.    - Hepatic veins: Patent.   Pelvis: Trace pelvic  ascites.  No loculated fluid collection or mass.    Supracervical hysterectomy.   Bones/Soft Tissues: Postoperative changes L4-S1 anterior and posterior   interbody fusion.  L1 vertebral body intraosseous hemangioma.  Unchanged   osteonecrosis RIGHT femoral head without subchondral collapse.  Stable   postsurgical appearance of the anterior abdominal wall.   Lower thorax: 5 mm nodule inferior RIGHT upper lobe (2:2) unchanged since   chest CT 04/20/2023.  No consolidation.  No pleural effusion.   Localizer images: No additional findings.   Impression    IMPRESSION:    Nonspecific trace pelvic ascites.  Otherwise, no acute process in the  abdomen or pelvis.     Esophagogastroduodenoscopy (EGD)  Order# [id number]  Reading physician: Hesham Rice MD Ordering provider: Hesham Rice MD Study date: 2/6/25     Result Information    Status: Final result (Exam End: 2/6/2025 11:21) Provider Status: Open     Result Text    Result Text   Impression  Grade A esophagitis in the GE junction  Non-obstructing Schatzki ring in the GE junction  The stomach appeared normal.  The duodenum appeared normal. Performed random biopsy to rule out celiac disease.        Colonoscopy  Order# [id number]  Reading physician: Hesham Rice MD Ordering provider: Hesham Rice MD Study date: 2/6/25     Result Information    Status: Final result (Exam End: 2/6/2025 11:21) Provider Status: Open     Result Text    Result Text   Impression  Subcentimeter polyp in the transverse colon was removed with cold snare  Scattered diverticulosis of moderate severity in the descending colon and sigmoid colon  Small (grade 1) hemorrhoids          Assessment/Plan   Diagnoses and all orders for this visit:  Generalized abdominal pain  Gallbladder sludge  59-year-old female with chronic abdominal pain.  Uncertain etiology for her chronic intermittent abdominal symptoms.  I doubt that the symptoms are due to gallbladder sludge.  However, her last ultrasound was done almost 1  year ago.  Recommend repeat right upper quadrant ultrasound.  Follow-up after ultrasound completed.      Shravan Lassiter MD

## 2025-03-12 ENCOUNTER — OFFICE VISIT (OUTPATIENT)
Dept: SURGERY | Facility: CLINIC | Age: 60
End: 2025-03-12
Payer: COMMERCIAL

## 2025-03-12 VITALS — BODY MASS INDEX: 47.18 KG/M2 | TEMPERATURE: 97.6 F | WEIGHT: 256.4 LBS | HEIGHT: 62 IN

## 2025-03-12 DIAGNOSIS — R10.84 GENERALIZED ABDOMINAL PAIN: Primary | ICD-10-CM

## 2025-03-12 DIAGNOSIS — K82.8 GALLBLADDER SLUDGE: ICD-10-CM

## 2025-03-12 PROCEDURE — 3008F BODY MASS INDEX DOCD: CPT | Performed by: SURGERY

## 2025-03-12 PROCEDURE — 99205 OFFICE O/P NEW HI 60 MIN: CPT | Performed by: SURGERY

## 2025-03-12 RX ORDER — METHOCARBAMOL 500 MG/1
TABLET, FILM COATED ORAL
COMMUNITY
Start: 2025-02-13

## 2025-03-12 RX ORDER — OXYCODONE AND ACETAMINOPHEN 5; 325 MG/1; MG/1
TABLET ORAL
COMMUNITY
Start: 2025-02-19

## 2025-03-12 RX ORDER — DEXLANSOPRAZOLE 60 MG/1
1 CAPSULE, DELAYED RELEASE ORAL DAILY
Qty: 30 CAPSULE | Refills: 11 | Status: SHIPPED | OUTPATIENT
Start: 2025-03-12

## 2025-03-12 RX ORDER — ACETAMINOPHEN 325 MG/1
TABLET ORAL
COMMUNITY
Start: 2025-03-03

## 2025-03-12 RX ORDER — BUDESONIDE, GLYCOPYRROLATE, AND FORMOTEROL FUMARATE 160; 9; 4.8 UG/1; UG/1; UG/1
AEROSOL, METERED RESPIRATORY (INHALATION)
COMMUNITY

## 2025-03-12 RX ORDER — CYCLOBENZAPRINE HCL 10 MG
TABLET ORAL
COMMUNITY
Start: 2025-03-03

## 2025-03-12 ASSESSMENT — PAIN SCALES - GENERAL: PAINLEVEL_OUTOF10: 8

## 2025-03-12 NOTE — LETTER
2025     Hesham Rice MD  [address] [facility] [address]  [facility] [address]    Patient: Josemanuel Reinoso   YOB: 1965   Date of Visit: 3/12/2025       Dear Dr. Hesham Rice MD:    Thank you for referring Josemanuel Reinoso to me for evaluation. Below are my notes for this consultation.  If you have questions, please do not hesitate to call me. I look forward to following your patient along with you.       Sincerely,     Shravan Lassiter MD      CC: No Recipients  ______________________________________________________________________________________    History Of Present Illness  HPI   The patient is a 59-year-old female who complains of a 30 to 40-year history of intermittent episodes of upper abdominal pain associated with nausea and vomiting.  The patient has been extensively evaluated by multiple gastroenterologists including at [facility] and .  She had a gallbladder ultrasound in May 2024 that showed sludge.  She went to the [facility] emergency room and had a CT abdomen/pelvis on 2025.  They recommended hospital admission and she declined.  She states that she gets episodes of severe, sharp upper abdominal pain (feels like labor pain) associated with nausea and vomiting which may last days at a time.  She feels as if her legs cannot move due to abdominal pressure with walking.  Her last episode persisted for a week, but is now improved.  The symptoms typically occur about once a month and are random in nature, unrelated to food or activity.  She has no specific food intolerance.  She had EGD/colonoscopy by Dr. Rice on 2025.    Past medical history: I spent 30 minutes reviewing notes, images, labs in electronic health records  Diabetes mellitus  Asthma  Hypertension  GERD  Hyperlipidemia  Fibromyalgia  Osteoarthritis  Chronic knee pain  Back operation in   Knee operation in , ,   Hysterectomy  2 abdominal hernia repairs   section x  3  Gastroparesis  Tachycardia  Obesity with BMI over 46    Past Medical History  She has a past medical history of Asthma, DM (diabetes mellitus) (Multi), GERD (gastroesophageal reflux disease), Hyperlipidemia, Hypertension, Personal history of other diseases of the circulatory system, and Personal history of other endocrine, nutritional and metabolic disease.    Surgical History  She has a past surgical history that includes MR angio head wo IV contrast (7/14/2019); MR angio neck wo IV contrast (7/14/2019); and MR angio head wo IV contrast (7/14/2019).     Allergies  Cephalexin, Azithromycin, Doxycycline, Erythromycin, Fluticasone propion-salmeterol, Ibuprofen, Lamotrigine, Metformin, Penicillins, Pregabalin, Estradiol, and Nickel    Social History  She reports that she has been smoking cigarettes. She has never been exposed to tobacco smoke. She has never used smokeless tobacco. She reports that she does not drink alcohol and does not use drugs.    Family History  No family history on file.    Review of Systems  Review of Systems:  Constitutional:  no fever, no chills, no significant weight change  Neurological: No history of CVA or seizure disorder  Eyes: No pain, no recent visual change  ENT:  No recent hearing loss  Neck: No pain  Cardiovascular: Tachycardia.  No chest pain, no other history of cardiac disease such as myocardial infarction or arrhythmia or congestive heart failure  Pulmonary: Asthma.  No shortness of breath, no other history of pulmonary disease such as pneumonia or COPD  Breast: No history of breast disease or breast mass  Gastrointestinal:   no abdominal pain, no nausea or vomiting, no constipation or diarrhea or blood in the stool.  No history of ulcers.  No liver, gallbladder or pancreas disease.  No intestinal disorder.  Genitourinary: No hematuria or dysuria, no kidney disease  Musculoskeletal:  no arthralgia, no muscle or bone pain  Integumentary:  no rash  Psychiatric:  No anxiety or  "depression  Endocrine: Diabetes mellitus  Hematologic/Lymphatic: No easy bruising or bleeding    Last Recorded Vitals  Temperature 36.4 °C (97.6 °F), temperature source Temporal, height 1.575 m (5' 2\"), weight 116 kg (256 lb 6.4 oz).    Physical Exam  Constitutional: Well-developed, well-nourished, obese, alert and oriented, no acute distress  Skin: Warm and dry, no lesions, no rashes, no jaundice  HEENT: Normocephalic, atraumatic, EOMI, no scleral icterus, eyes have no redness or swelling or discharge, external inspection of ears and nose is normal, mucous membranes moist  Neck: Soft, nontender, no mass or adenopathy  Cardiac: Regular rate and rhythm, no murmur  Chest: Patent airway, clear to auscultation, normal breath sounds with good chest expansion, no wheezes or rales or rhonchi noted, thorax symmetric  Abdomen: Nondistended, obese, positive bowel sounds, soft, mild upper abdominal tenderness, no lower abdominal tenderness, no mass  Rectal: Not performed  Extremities: No injury, moderate bilateral lower extremity edema, no calf tenderness  Lymphatic: No cervical adenopathy  Neurological: Alert and oriented x3, intact sensory and motor function, no obvious focal neurologic abnormalities  Psychological: Appropriate mood and behavior  Examination and discussion chaperoned by Agustina Lockwood    Relevant Results  Labs from March 6, 2025: WBC 6.5, hemoglobin 12.5, platelet 385  BMP normal.  TB 0.2, alk phos 130, AST 10, ALT 7.  Lipase 20.      I reviewed the gallbladder ultrasound report and images from May 11, 2024:  IMPRESSION:  1. Findings of hepatic steatosis.  2. Gallbladder sludge.      I reviewed the CT abdomen/pelvis report from [facility] from March 6, 2025, the images are not available at this time:  CT abdomen pelvis w IV contrast  Order: [id number]  RESULT:   Liver: No mass.   Biliary: No bile duct dilation.  No calcified stones or gallbladder   dilation.   Spleen: No mass. No splenomegaly.   Pancreas: No mass or duct " dilation.   Adrenals: No mass.   Kidneys: No mass, calculus or hydronephrosis.   GI tract: No dilation or wall thickening. Normal appendix.   Lymph nodes: No abdominal or pelvic lymphadenopathy.   Mesentery/Peritoneum: No ascites or mass.   Retroperitoneum: No mass.   Vasculature:    - Abdominal aorta and iliac arteries: Minimal atherosclerotic   calcification without aneurysm.    - Celiac and SMA: Patent without stenosis.    - Portal venous system (SMV, splenic vein, portal vein and branches):   Patent.    - Hepatic veins: Patent.   Pelvis: Trace pelvic ascites.  No loculated fluid collection or mass.    Supracervical hysterectomy.   Bones/Soft Tissues: Postoperative changes L4-S1 anterior and posterior   interbody fusion.  L1 vertebral body intraosseous hemangioma.  Unchanged   osteonecrosis RIGHT femoral head without subchondral collapse.  Stable   postsurgical appearance of the anterior abdominal wall.   Lower thorax: 5 mm nodule inferior RIGHT upper lobe (2:2) unchanged since   chest CT 04/20/2023.  No consolidation.  No pleural effusion.   Localizer images: No additional findings.   Impression    IMPRESSION:    Nonspecific trace pelvic ascites.  Otherwise, no acute process in the  abdomen or pelvis.     Esophagogastroduodenoscopy (EGD)  Order# [id number]  Reading physician: Hesham Rice MD Ordering provider: Hesham Rice MD Study date: 2/6/25     Result Information    Status: Final result (Exam End: 2/6/2025 11:21) Provider Status: Open     Result Text    Result Text   Impression  Grade A esophagitis in the GE junction  Non-obstructing Schatzki ring in the GE junction  The stomach appeared normal.  The duodenum appeared normal. Performed random biopsy to rule out celiac disease.        Colonoscopy  Order# [id number]  Reading physician: Hesham Rice MD Ordering provider: Hesham Rice MD Study date: 2/6/25     Result Information    Status: Final result (Exam End: 2/6/2025 11:21) Provider Status: Open     Result  Text    Result Text   Impression  Subcentimeter polyp in the transverse colon was removed with cold snare  Scattered diverticulosis of moderate severity in the descending colon and sigmoid colon  Small (grade 1) hemorrhoids          Assessment/Plan  Diagnoses and all orders for this visit:  Generalized abdominal pain  Gallbladder sludge  59-year-old female with chronic abdominal pain.  Uncertain etiology for her chronic intermittent abdominal symptoms.  I doubt that the symptoms are due to gallbladder sludge.  However, her last ultrasound was done almost 1 year ago.  Recommend repeat right upper quadrant ultrasound.  Follow-up after ultrasound completed.      Shravan Lassiter MD

## 2025-03-12 NOTE — LETTER
2025     Osito Caba MD  [address]  [facility] [address]    Patient: Josemanuel Reinoso   YOB: 1965   Date of Visit: 3/12/2025       Dear Dr. Osito Caba MD:    Thank you for referring Josemanuel Reinoso to me for evaluation. Below are my notes for this consultation.  If you have questions, please do not hesitate to call me. I look forward to following your patient along with you.       Sincerely,     Shravan Lassiter MD      CC: No Recipients  ______________________________________________________________________________________    History Of Present Illness  HPI   The patient is a 59-year-old female who complains of a 30 to 40-year history of intermittent episodes of upper abdominal pain associated with nausea and vomiting.  The patient has been extensively evaluated by multiple gastroenterologists including at [facility] and .  She had a gallbladder ultrasound in May 2024 that showed sludge.  She went to the [facility] emergency room and had a CT abdomen/pelvis on 2025.  They recommended hospital admission and she declined.  She states that she gets episodes of severe, sharp upper abdominal pain (feels like labor pain) associated with nausea and vomiting which may last days at a time.  She feels as if her legs cannot move due to abdominal pressure with walking.  Her last episode persisted for a week, but is now improved.  The symptoms typically occur about once a month and are random in nature, unrelated to food or activity.  She has no specific food intolerance.    Past medical history: I spent 30 minutes reviewing notes, images, labs in electronic health records  Diabetes mellitus  Asthma  Hypertension  GERD  Hyperlipidemia  Fibromyalgia  Osteoarthritis  Chronic knee pain  Back operation in   Knee operation in , ,   Hysterectomy  2 abdominal hernia repairs   section x 3  Gastroparesis  Tachycardia  Obesity with  BMI over 46    Past Medical History  She has a past medical history of Asthma, DM (diabetes mellitus) (Multi), GERD (gastroesophageal reflux disease), Hyperlipidemia, Hypertension, Personal history of other diseases of the circulatory system, and Personal history of other endocrine, nutritional and metabolic disease.    Surgical History  She has a past surgical history that includes MR angio head wo IV contrast (7/14/2019); MR angio neck wo IV contrast (7/14/2019); and MR angio head wo IV contrast (7/14/2019).     Allergies  Cephalexin, Azithromycin, Doxycycline, Erythromycin, Fluticasone propion-salmeterol, Ibuprofen, Lamotrigine, Metformin, Penicillins, Pregabalin, Estradiol, and Nickel    Social History  She reports that she has been smoking cigarettes. She has never been exposed to tobacco smoke. She has never used smokeless tobacco. She reports that she does not drink alcohol and does not use drugs.    Family History  No family history on file.    Review of Systems  Review of Systems:  Constitutional:  no fever, no chills, no significant weight change  Neurological: No history of CVA or seizure disorder  Eyes: No pain, no recent visual change  ENT:  No recent hearing loss  Neck: No pain  Cardiovascular: Tachycardia.  No chest pain, no other history of cardiac disease such as myocardial infarction or arrhythmia or congestive heart failure  Pulmonary: Asthma.  No shortness of breath, no other history of pulmonary disease such as pneumonia or COPD  Breast: No history of breast disease or breast mass  Gastrointestinal:   no abdominal pain, no nausea or vomiting, no constipation or diarrhea or blood in the stool.  No history of ulcers.  No liver, gallbladder or pancreas disease.  No intestinal disorder.  Genitourinary: No hematuria or dysuria, no kidney disease  Musculoskeletal:  no arthralgia, no muscle or bone pain  Integumentary:  no rash  Psychiatric:  No anxiety or depression  Endocrine: Diabetes  "mellitus  Hematologic/Lymphatic: No easy bruising or bleeding    Last Recorded Vitals  Temperature 36.4 °C (97.6 °F), temperature source Temporal, height 1.575 m (5' 2\"), weight 116 kg (256 lb 6.4 oz).    Physical Exam  Constitutional: Well-developed, well-nourished, obese, alert and oriented, no acute distress  Skin: Warm and dry, no lesions, no rashes, no jaundice  HEENT: Normocephalic, atraumatic, EOMI, no scleral icterus, eyes have no redness or swelling or discharge, external inspection of ears and nose is normal, mucous membranes moist  Neck: Soft, nontender, no mass or adenopathy  Cardiac: Regular rate and rhythm, no murmur  Chest: Patent airway, clear to auscultation, normal breath sounds with good chest expansion, no wheezes or rales or rhonchi noted, thorax symmetric  Abdomen: Nondistended, obese, positive bowel sounds, soft, mild upper abdominal tenderness, no lower abdominal tenderness, no mass  Rectal: Not performed  Extremities: No injury, moderate bilateral lower extremity edema, no calf tenderness  Lymphatic: No cervical adenopathy  Neurological: Alert and oriented x3, intact sensory and motor function, no obvious focal neurologic abnormalities  Psychological: Appropriate mood and behavior  Examination and discussion chaperoned by Agustina Lockwood    Relevant Results  Labs from March 6, 2025: WBC 6.5, hemoglobin 12.5, platelet 385  BMP normal.  TB 0.2, alk phos 130, AST 10, ALT 7.  Lipase 20.      I reviewed the gallbladder ultrasound report and images from May 11, 2024:  IMPRESSION:  1. Findings of hepatic steatosis.  2. Gallbladder sludge.      I reviewed the CT abdomen/pelvis report from [facility] from March 6, 2025, the images are not available at this time:  CT abdomen pelvis w IV contrast  Order: [id number]  RESULT:   Liver: No mass.   Biliary: No bile duct dilation.  No calcified stones or gallbladder   dilation.   Spleen: No mass. No splenomegaly.   Pancreas: No mass or duct dilation.   Adrenals: No mass. "   Kidneys: No mass, calculus or hydronephrosis.   GI tract: No dilation or wall thickening. Normal appendix.   Lymph nodes: No abdominal or pelvic lymphadenopathy.   Mesentery/Peritoneum: No ascites or mass.   Retroperitoneum: No mass.   Vasculature:    - Abdominal aorta and iliac arteries: Minimal atherosclerotic   calcification without aneurysm.    - Celiac and SMA: Patent without stenosis.    - Portal venous system (SMV, splenic vein, portal vein and branches):   Patent.    - Hepatic veins: Patent.   Pelvis: Trace pelvic ascites.  No loculated fluid collection or mass.    Supracervical hysterectomy.   Bones/Soft Tissues: Postoperative changes L4-S1 anterior and posterior   interbody fusion.  L1 vertebral body intraosseous hemangioma.  Unchanged   osteonecrosis RIGHT femoral head without subchondral collapse.  Stable   postsurgical appearance of the anterior abdominal wall.   Lower thorax: 5 mm nodule inferior RIGHT upper lobe (2:2) unchanged since   chest CT 04/20/2023.  No consolidation.  No pleural effusion.   Localizer images: No additional findings.   Impression    IMPRESSION:    Nonspecific trace pelvic ascites.  Otherwise, no acute process in the  abdomen or pelvis.     Esophagogastroduodenoscopy (EGD)  Order# [id number]  Reading physician: Hesham Rice MD Ordering provider: Hesham Rice MD Study date: 2/6/25     Result Information    Status: Final result (Exam End: 2/6/2025 11:21) Provider Status: Open     Result Text    Result Text   Impression  Grade A esophagitis in the GE junction  Non-obstructing Schatzki ring in the GE junction  The stomach appeared normal.  The duodenum appeared normal. Performed random biopsy to rule out celiac disease.        Colonoscopy  Order# [id number]  Reading physician: Hesham Rice MD Ordering provider: Hesham Rice MD Study date: 2/6/25     Result Information    Status: Final result (Exam End: 2/6/2025 11:21) Provider Status: Open     Result Text    Result Text    Impression  Subcentimeter polyp in the transverse colon was removed with cold snare  Scattered diverticulosis of moderate severity in the descending colon and sigmoid colon  Small (grade 1) hemorrhoids          Assessment/Plan  Diagnoses and all orders for this visit:  Generalized abdominal pain  Gallbladder sludge  59-year-old female with chronic abdominal pain.  Uncertain etiology for her chronic intermittent abdominal symptoms.  I doubt that the symptoms are due to gallbladder sludge.  However, her last ultrasound was done almost 1 year ago.  Recommend repeat right upper quadrant ultrasound.  Follow-up after ultrasound completed.      Shravan Lassiter MD

## 2025-03-14 ENCOUNTER — INFUSION (OUTPATIENT)
Dept: HEMATOLOGY/ONCOLOGY | Facility: CLINIC | Age: 60
End: 2025-03-14
Payer: COMMERCIAL

## 2025-03-14 VITALS
RESPIRATION RATE: 20 BRPM | HEART RATE: 75 BPM | TEMPERATURE: 97 F | WEIGHT: 254.19 LBS | BODY MASS INDEX: 46.49 KG/M2 | SYSTOLIC BLOOD PRESSURE: 172 MMHG | DIASTOLIC BLOOD PRESSURE: 81 MMHG

## 2025-03-14 DIAGNOSIS — M32.9 SYSTEMIC LUPUS ERYTHEMATOSUS, UNSPECIFIED SLE TYPE, UNSPECIFIED ORGAN INVOLVEMENT STATUS (MULTI): ICD-10-CM

## 2025-03-14 RX ORDER — HEPARIN SODIUM,PORCINE/PF 10 UNIT/ML
50 SYRINGE (ML) INTRAVENOUS AS NEEDED
OUTPATIENT
Start: 2025-03-14

## 2025-03-14 RX ORDER — HEPARIN 100 UNIT/ML
500 SYRINGE INTRAVENOUS AS NEEDED
OUTPATIENT
Start: 2025-03-14

## 2025-03-14 ASSESSMENT — PAIN SCALES - GENERAL: PAINLEVEL_OUTOF10: 9

## 2025-03-14 NOTE — PROGRESS NOTES
Infusion held today d/t inability to start peripheral IV   5 attempts by multiple Rns  Visit rescheduled

## 2025-03-25 ENCOUNTER — APPOINTMENT (OUTPATIENT)
Dept: GASTROENTEROLOGY | Facility: CLINIC | Age: 60
End: 2025-03-25
Payer: COMMERCIAL

## 2025-03-25 ENCOUNTER — TELEPHONE (OUTPATIENT)
Dept: GASTROENTEROLOGY | Facility: CLINIC | Age: 60
End: 2025-03-25

## 2025-03-25 NOTE — TELEPHONE ENCOUNTER
Patient called 03/25/2025, is a patient of Dr. Hartley and seen on 03/11/2025 has been vomiting the last 2 weeks.  She has been taking Onest but not helping.  She claims she can't keep food down.  Thank you

## 2025-03-28 ENCOUNTER — APPOINTMENT (OUTPATIENT)
Dept: HEMATOLOGY/ONCOLOGY | Facility: CLINIC | Age: 60
End: 2025-03-28
Payer: COMMERCIAL

## 2025-04-04 ENCOUNTER — HOSPITAL ENCOUNTER (OUTPATIENT)
Dept: RADIOLOGY | Facility: HOSPITAL | Age: 60
Discharge: HOME | End: 2025-04-04
Payer: COMMERCIAL

## 2025-04-04 DIAGNOSIS — K82.8 GALLBLADDER SLUDGE: ICD-10-CM

## 2025-04-04 DIAGNOSIS — R10.84 GENERALIZED ABDOMINAL PAIN: ICD-10-CM

## 2025-04-04 PROCEDURE — 76705 ECHO EXAM OF ABDOMEN: CPT

## 2025-04-04 PROCEDURE — 76705 ECHO EXAM OF ABDOMEN: CPT | Performed by: STUDENT IN AN ORGANIZED HEALTH CARE EDUCATION/TRAINING PROGRAM

## 2025-04-16 ENCOUNTER — TELEPHONE (OUTPATIENT)
Dept: CARDIOLOGY | Facility: CLINIC | Age: 60
End: 2025-04-16
Payer: COMMERCIAL

## 2025-04-16 NOTE — TELEPHONE ENCOUNTER
Josemanuel called to say she missed a call from Dr. Browning's nurse. She would like a call back from whom ever tried to call her. She can be reached at:    [phone]

## 2025-04-16 NOTE — TELEPHONE ENCOUNTER
Regla is the one who called Josemanuel to let her know her May 14th appointment was moved to May 13th at 10:30 with Jose. Called Josemanuel and she is aware.

## 2025-04-22 NOTE — PROGRESS NOTES
CC: abdominal pain,Nausea,vomiting.      History of Present Illness:   Josemanuel Reinoso is a 59 y.o. female with a PMH of HTN, HLD, obesity, GERD, COPD, fibromyalgia, T2DM, neurogenic bladder, opiate dependence, postlaminectomy syndrome, RLS, bipolar disorder, gastroparesis, gout, pulmonary hypertension, Chronic anemia ,IDRIS, antiphospholipid syndrome, heart failure and more.     ER visit on 03/26/2025 for N/V/abdominal pain.similar episode roughly 2 weeks ago where she was seen at the [place] emergency department.. Dc'd with  home-going initiation of PPI- omeprazole 20 mg.   Office Visit with Dr Rice on 1/28/2025 for ROSA. EGD and colonoscopy for new Hb drop.  Was  prescribed Dexilant.     Today here for recurrent complaint of abdominal pain with nausea and vomiting.   Reports  abdominal pain -epigastric, constant  8/10,squeezing ,spasm like pain. Reports intermittent to constant nausea and vomiting 2-3 times , most of time food or fluid  she had. episodes about once a week. Takes zofran.  Denies hematemesis.   Reports heartburn, acid reflux on daily basis.  She was taking Dexilant for few weeks after her endoscopy however due to insurance she did not get the refill.  And she thinks her episodes of abdominal pain with nausea and vomiting flaring up with not taking the medication- Dexilant -which helped in the past.  She is taking omeprazole and she thinks omeprazole not helping with the symptoms.   Occasional dysphagia with pills, and some big portion of food.  Feels full with eating small portions.   Dx of gastroparesis  with Dr croft, about 5-6 years ago.  She has been taking Reglan intermittently, she thinks Reglan helps with some symptoms.  Take Zofran for nausea and vomiting as needed with some effect.     Reports constipation taking metamucil. Denies diarrhea, fever.  BM once daily soft /solid/brown.#  2-3 on [place] stool chart.  Denies hematochezia, melena.   Denies weight loss or appetite change.    Denies  use of NSAID, aspirin or other blood thinners.  Denies any recent change in medication or use of new medication.    Denies family history of colon cancer, GI cancer celiac disease, inflammatory bowel disease, liver disease.  Denies alcohol use, smoking about half a pack. denies illicit drug use.  Has family history of gallbladder disease.  Denies  History of gallbladder or liver disease or hepatitis.  She denies any previous cholecystectomy, appendectomy  had previous colonoscopy that showed diverticulosis    HERNIA REPAIR HX 10/09/2018 -ventral/umbilical , X3     Review of Systems  ROS Negative unless otherwise stated above.      Past Medical/Surgical History  Medical History[1]   Surgical History[2]     Social History   reports that she has been smoking cigarettes. She has never been exposed to tobacco smoke. She has never used smokeless tobacco. She reports that she does not drink alcohol and does not use drugs.     Family History  family history is not on file.     Allergies  RX Allergies[3]    Medications  Current Outpatient Medications   Medication Instructions    acetaminophen (Tylenol) 325 mg tablet TAKE 1 TO 2 TABLETS BY MOUTH EVERY 4 HOURS AS NEEDED FOR PAIN FOR UP TO 7 DAYS    albuterol 90 mcg/actuation inhaler inhale 1 to 2 puffs by mouth every 4 hours Inhalation    albuterol 2.5 mg, Every 6 hours PRN    aluminum hydrox-magnesium carb (Gaviscon)  mg/15 mL suspension oral suspension 16 mL, oral, Every 6 hours PRN    azelastine (Astelin) 137 mcg (0.1 %) nasal spray 1 spray, 2 times daily    bisacodyl (Dulcolax) 5 mg EC tablet take 1 tablet by mouth once daily if needed for constipation    Breztri Aerosphere 160-9-4.8 mcg/actuation HFA aerosol inhaler PLEASE SEE ATTACHED FOR DETAILED DIRECTIONS    capsaicin  cream (Capzasin-HP) 0.1 % cream cream 1 Application, Daily    carvedilol (COREG) 25 mg, oral, 2 times daily (morning and late afternoon)    cetirizine (ZYRTEC) 10 mg, Daily     cetirizine-pseudoephedrine (ZyrTEC-D) 5-120 mg 12 hr tablet 1 tablet, Daily RT    chlorthalidone (HYGROTON) 25 mg, oral, Daily RT    cholecalciferol (VITAMIN D3) 1,000 Units, Daily    ciclopirox (Penlac) 8 % solution 1 Application, Nightly    cromolyn (Opticrom) 4 % ophthalmic solution 1 drop, 4 times daily    cyanocobalamin (Vitamin B-12) 1,000 mcg tablet 1 tablet, Daily RT    cyclobenzaprine (Flexeril) 10 mg tablet TAKE 1 TABLET BY MOUTH EVERY 8 HOURS AS NEEDED FOR UP TO 5 DAYS    Dexilant 60 mg, oral, Daily, Swallow whole. Do not crush or chew.    dexlansoprazole (DEXILANT) 60 mg, oral, Daily, Do not crush or chew.    dilTIAZem (Cardizem) 30 mg immediate release tablet TAKE 1 TABLET BY MOUTH 2 TIMES A DAY AS NEEDED (PALPITATIONS).    docusate sodium (COLACE) 100 mg, 2 times daily PRN    empagliflozin (Jardiance) 10 mg 1 tablet, Daily with breakfast    famotidine (PEPCID) 40 mg, oral, Nightly    fluticasone (Flonase) 50 mcg/actuation nasal spray 1 spray, 2 times daily PRN    fluticasone-umeclidin-vilanter (Trelegy Ellipta) 200-62.5-25 mcg blister with device 1 puff, Daily RT    FreeStyle Jasmyne 3 Sensor device 1 kit, As needed    furosemide (LASIX) 20 mg, oral, Daily PRN    HYDROcodone-acetaminophen (Norco) 5-325 mg tablet 1 tablet, Every 6 hours PRN    hydroxychloroquine (PLAQUENIL) 200 mg, oral, 2 times daily    hyoscyamine 0.125 mg disintegrating tablet DISSOLVE 1 TABLET UNDER THE TONGUE EVERY 4 HOURS AS NEEDED    ipratropium-albuteroL (Duo-Neb) 0.5-2.5 mg/3 mL nebulizer solution 3 mL, Every 6 hours PRN    lidocaine (Lidoderm) 5 % patch 1 patch, Daily PRN    melatonin 5 mg, Daily PRN    meloxicam (MOBIC) 15 mg, Daily    methocarbamol (Robaxin) 500 mg tablet     metoclopramide (Reglan) 5 mg tablet 1 tablet, Daily    miconazole (Micotin) 2 % cream 1 Application, As needed    montelukast (SINGULAIR) 10 mg, Daily PRN    naloxone (Narcan) 4 mg/0.1 mL nasal spray 1 spray, As needed    ondansetron (Zofran) 4 mg tablet  1 tablet, Every 6 hours PRN    oxybutynin XL (DITROPAN-XL) 10 mg, Daily    oxyCODONE-acetaminophen (Percocet) 5-325 mg tablet     Ozempic 0.25 mg, Weekly    promethazine-DM (Phenergan-DM) 6.25-15 mg/5 mL syrup 5 mL, As needed    Pulmicort Flexhaler 90 mcg/actuation inhaler 1-2 puffs, 2 times daily RT    rOPINIRole (REQUIP) 0.5 mg, Nightly    rosuvastatin (Crestor) 20 mg tablet Take 1 tablet (20 mg) by mouth once daily.    Spiriva Respimat 1.25 mcg/actuation inhaler 2 puffs, Daily    Symbicort 80-4.5 mcg/actuation inhaler 2 puffs, 2 times daily RT    Trelegy Ellipta 100-62.5-25 mcg blister with device 1 puff, Daily        Objective   Visit Vitals  BP (!) 156/99   Pulse 81      Physical Exam   General: A&Ox3, NAD.  HEENT: No scleral icterus, no conjunctival pallor, normocephalic, atraumatic  Neck:  atraumatic, trachea midline, no JVD   CV: RRR. Positive S1/S2.   Resp: CTA bilaterally. No wheezing, rhonchi or rales.   GI: BSx4. Obese , no distension, Soft, Epigastric tenderness to palpation, no rebound tenderness, no guarding, no rigidity, no discernible ascites   Extremities: no lower extremity edema.  Neuro: No focal deficits. no asterixis  Psych: pleasant and cooperative.  Skin:  Warm and dry, no jaundice      Lab Results   Component Value Date    WBC 8.6 01/14/2025    HGB 11.4 (L) 01/14/2025    HCT 37.4 01/14/2025    MCV 74 (L) 01/14/2025     01/14/2025       Chemistry    Lab Results   Component Value Date/Time     01/14/2025 1003    K 3.8 01/14/2025 1003     01/14/2025 1003    CO2 25 01/14/2025 1003    BUN 11 01/14/2025 1003    CREATININE 0.82 01/14/2025 1003    Lab Results   Component Value Date/Time    CALCIUM 9.1 01/14/2025 1003    ALKPHOS 118 01/14/2025 1003    AST 11 01/14/2025 1003    ALT 7 01/14/2025 1003    BILITOT 0.3 01/14/2025 1003        Lab:   ESR 27 ,CRP 0.9 (4/3/25).   3/26/25 : WBC 6.28 RBC 4.82 Hemoglobin 11.2(!) Hematocrit 36.8 MCV 76.3(!) MCH 23.2(!) MCHC 30.4(!) RDW-CV  19.5(!) Platelet Count 361,  Protein, Total 7.2 Albumin 4.0 Calcium 9.5 Bilirubin, Total <0.2(!) Alkaline Phosphatase 147(!) AST 14   ALT 8 Glucose 85 BUN 5(!) Creatinine 0.88 Sodium 144 Potassium 3.7 Chloride 107 CO2 26 Anion Gap 11 ,Lipase: 24  1/14/25Iron 51, TIBC 392, Sat%13 ,ferritin 81.1(7/22/24)  EGD : 2/6/2025  Grade A esophagitis in the GE junction  Non-obstructing Schatzki ring in the GE junction  The stomach appeared normal.  The duodenum appeared normal. Performed random biopsy to rule out celiac disease.  EGD 6/9/23  - Normal esophagus.  - Z-line regular, 38 cm from the incisors.   - Erythematous mucosa in the antrum.  - Normal examined duodenum.  -Biopsies were taken with a cold forceps for Helicobacter pylori testing.  Biopsy negative for histopathologic changes.   Colonoscopy:   2/6/2025 mpression  Subcentimeter polyp in the transverse colon was removed with cold snare  Scattered diverticulosis of moderate severity in the descending colon and sigmoid colon  Small (grade 1) hemorrhoids  Repeat colonoscopy in 5 years, due: 2/5/2030   Will order VCE for further evaluation of anemia   Biopsy with Tubular adenoma.   6/9/2023 : One 12 mm polyp in the transverse colon, removed   CT ABDOMEN AND PELVIS WITH IV CONTRAST Mar  6 2025 :GI tract: No dilation or wall thickening. Normal appendix. Biliary: No bile duct dilation.  No calcified stones or gallbladder   dilation.   US ABD RIGHT UPPER QUADRANT Mar 26 2025 : Normal sonographic appearance of the liver with no hepatic lesion. No cholelithiasis or biliary ductal dilatation.   NM GASTRIC EMPTYING SOLID 4/18/2024 : Normal gastric emptying of a radiolabeled solid meal.     Assessment & Plan  Epigastric pain  Chronic .history of intermittent to constant epigastric pain with nausea and vomiting multiple ER visit.  Episodes of recurrent symptoms about once a week.  She was on Dexilant but she stopped taking few months ago and she thinks her symptoms are recurrent  with not taking Dexilant.  She was given omeprazole after her ER visit and she thinks is not helping her.    Had last  CT scan ,ultrasound right upper quadrant, gastric emptying study with no abnormality.  Recent colonoscopy with diverticulosis,polyp.     Epigastric pain possible due to gastritis or gastroparesis.    Advised GI friendly diet to low residue diet.  Lifestyle modification discussed to prevent acid reflux and gastritis.  Take Dexilant as advised. Will refill  Dexilant if need any.   Continue famotidine.   Gastroesophageal reflux disease with esophagitis, unspecified whether hemorrhage  Chronic EGD in February with grade a esophagitis, she was given Dexilant which was helping her with her reflux symptoms however she stopped due to not getting refills due  to insurance.  Recently on omeprazole, she thinks it is not helping with the symptoms at all.    Take Dexilant as advised.  Continue take famotidine 40 mg at night.  In order to prevent acid reflux: please elevate the head of the bed 6 to 8 inches, have an early dinner at least 3 hours before sleep, have small frequent meals (3-5 small meals per day), avoid lying down after meals, avoid spices, juices, soda, tomatoes, ankit, oranges, caffeine, other caffeinated beverages, chocolate, alcohol, NSAIDs (such as Advil Aleve ibuprofen naproxen Motrin Mobic Excedrin among others), smoking, fatty foods, peppermint among others   Gastroparesis  With intermittent to constant nausea and vomiting and multiple episodes of nausea vomiting with abdominal pain.    Dx of gastroparesis  with Dr croft, about 5-6 years ago.  She has been taking Reglan intermittently, she thinks Reglan helps with some symptoms.  Take Zofran for nausea and vomiting as needed with some effect.     Follow Gastroparesis Diet. Avoid constipation.   Continue taking Reglan as prescribed.  Denies side effects of Reglan.  She reports she is allergic to erythromycin.  Continue Zofran for nausea and  vomiting as needed    Elevated alkaline phosphatase level  Is elevated alkaline phosphatase since 2-3 occasions.  Right upper quadrant ultrasound with no  liver or gallbladder abnormality.    Denies History of gallbladder or liver disease or hepatitis.  Will order GGT and hepatitis panel.  Complete CMP as ordered by other provider.    Other iron deficiency anemia  Chronic. Previous EGD and colonoscopy did not find the source of bleeding she was advised to have capsule study.  Will schedule capsule study in 2 months during follow-up after subsiding her GI symptoms.      Looks like the Patient had Dexilant prescription with refills  given by Dr. Rice, advised to contact with pharmacy to get the medicine.    FOLLOW UP IN 2 MONTHS.              Rebecca Metzger, APRN-CNP    The note was created using voice recognition transcription software. Despite proofreading, unintentional typographical errors may be present. Please contact the GI office with any questions or concerns.          [1]   Past Medical History:  Diagnosis Date    Asthma     DM (diabetes mellitus) (Multi)     GERD (gastroesophageal reflux disease)     Hyperlipidemia     Hypertension     Personal history of other diseases of the circulatory system     History of hypertension    Personal history of other endocrine, nutritional and metabolic disease     History of diabetes mellitus   [2]   Past Surgical History:  Procedure Laterality Date    MR HEAD ANGIO WO IV CONTRAST  7/14/2019    MR HEAD ANGIO WO IV CONTRAST [facility]    MR HEAD ANGIO WO IV CONTRAST  7/14/2019    MR HEAD ANGIO WO IV CONTRAST [facility]    MR NECK ANGIO WO IV CONTRAST  7/14/2019    MR NECK ANGIO WO IV CONTRAST [facility]   [3]   Allergies  Allergen Reactions    Cephalexin GI Upset and Swelling    Azithromycin GI Upset     c/o severe stomach burning    Doxycycline Diarrhea and Nausea/vomiting    Erythromycin GI Upset and Nausea/vomiting    Fluticasone  Propion-Salmeterol Other     Sore throat / thrush     Ibuprofen GI Upset    Lamotrigine Swelling     Had bilateral swelling of feet and lower legs. The swelling subsided and is now gone 2 days after she stopped Lamictal    Metformin GI Upset    Penicillins Hives and Rash    Pregabalin Hives    Estradiol Itching and Rash    Nickel Rash

## 2025-04-23 ENCOUNTER — OFFICE VISIT (OUTPATIENT)
Dept: GASTROENTEROLOGY | Facility: CLINIC | Age: 60
End: 2025-04-23
Payer: COMMERCIAL

## 2025-04-23 VITALS
HEIGHT: 62 IN | WEIGHT: 223 LBS | SYSTOLIC BLOOD PRESSURE: 156 MMHG | DIASTOLIC BLOOD PRESSURE: 99 MMHG | BODY MASS INDEX: 41.04 KG/M2 | HEART RATE: 81 BPM

## 2025-04-23 DIAGNOSIS — K31.84 GASTROPARESIS: Primary | ICD-10-CM

## 2025-04-23 DIAGNOSIS — K21.00 GASTROESOPHAGEAL REFLUX DISEASE WITH ESOPHAGITIS, UNSPECIFIED WHETHER HEMORRHAGE: ICD-10-CM

## 2025-04-23 DIAGNOSIS — D50.8 OTHER IRON DEFICIENCY ANEMIA: ICD-10-CM

## 2025-04-23 DIAGNOSIS — R10.13 EPIGASTRIC PAIN: ICD-10-CM

## 2025-04-23 DIAGNOSIS — R74.8 ELEVATED ALKALINE PHOSPHATASE LEVEL: ICD-10-CM

## 2025-04-23 PROCEDURE — 3080F DIAST BP >= 90 MM HG: CPT | Performed by: NURSE PRACTITIONER

## 2025-04-23 PROCEDURE — 99215 OFFICE O/P EST HI 40 MIN: CPT | Performed by: NURSE PRACTITIONER

## 2025-04-23 PROCEDURE — 3008F BODY MASS INDEX DOCD: CPT | Performed by: NURSE PRACTITIONER

## 2025-04-23 PROCEDURE — 3077F SYST BP >= 140 MM HG: CPT | Performed by: NURSE PRACTITIONER

## 2025-04-23 NOTE — ASSESSMENT & PLAN NOTE
Chronic EGD in February with grade a esophagitis, she was given Dexilant which was helping her with her reflux symptoms however she stopped due to not getting refills due  to insurance.  Recently on omeprazole, she thinks it is not helping with the symptoms at all.    Take Dexilant as advised.  Continue take famotidine 40 mg at night.  In order to prevent acid reflux: please elevate the head of the bed 6 to 8 inches, have an early dinner at least 3 hours before sleep, have small frequent meals (3-5 small meals per day), avoid lying down after meals, avoid spices, juices, soda, tomatoes, ankit, oranges, caffeine, other caffeinated beverages, chocolate, alcohol, NSAIDs (such as Advil Aleve ibuprofen naproxen Motrin Mobic Excedrin among others), smoking, fatty foods, peppermint among others

## 2025-04-23 NOTE — ASSESSMENT & PLAN NOTE
Chronic .history of intermittent to constant epigastric pain with nausea and vomiting multiple ER visit.  Episodes of recurrent symptoms about once a week.  She was on Dexilant but she stopped taking few months ago and she thinks her symptoms are recurrent with not taking Dexilant.  She was given omeprazole after her ER visit and she thinks is not helping her.    Had last  CT scan ,ultrasound right upper quadrant, gastric emptying study with no abnormality.  Recent colonoscopy with diverticulosis,polyp.     Epigastric pain possible due to gastritis or gastroparesis.    Advised GI friendly diet to low residue diet.  Lifestyle modification discussed to prevent acid reflux and gastritis.  Take Dexilant as advised. Will refill  Dexilant if need any.   Continue famotidine.

## 2025-04-23 NOTE — ASSESSMENT & PLAN NOTE
With intermittent to constant nausea and vomiting and multiple episodes of nausea vomiting with abdominal pain.    Dx of gastroparesis  with Dr croft, about 5-6 years ago.  She has been taking Reglan intermittently, she thinks Reglan helps with some symptoms.  Take Zofran for nausea and vomiting as needed with some effect.     Follow Gastroparesis Diet. Avoid constipation.   Continue taking Reglan as prescribed.  Denies side effects of Reglan.  She reports she is allergic to erythromycin.  Continue Zofran for nausea and vomiting as needed

## 2025-04-23 NOTE — ASSESSMENT & PLAN NOTE
Chronic. Previous EGD and colonoscopy did not find the source of bleeding she was advised to have capsule study.  Will schedule capsule study in 2 months during follow-up after subsiding her GI symptoms.      Looks like the Patient had Dexilant prescription with refills  given by Dr. Rice, advised to contact with pharmacy to get the medicine.    FOLLOW UP IN 2 MONTHS.

## 2025-04-24 LAB
GGT SERPL-CCNC: 35 U/L (ref 3–65)
HAV IGM SERPL QL IA: NORMAL
HBV CORE IGM SERPL QL IA: NORMAL
HBV SURFACE AG SERPL QL IA: NORMAL
HCV AB SERPL QL IA: NORMAL

## 2025-04-26 ENCOUNTER — HOSPITAL ENCOUNTER (EMERGENCY)
Facility: HOSPITAL | Age: 60
Discharge: HOME | End: 2025-04-26
Attending: EMERGENCY MEDICINE
Payer: COMMERCIAL

## 2025-04-26 ENCOUNTER — APPOINTMENT (OUTPATIENT)
Dept: RADIOLOGY | Facility: HOSPITAL | Age: 60
End: 2025-04-26
Payer: COMMERCIAL

## 2025-04-26 ENCOUNTER — APPOINTMENT (OUTPATIENT)
Dept: CARDIOLOGY | Facility: HOSPITAL | Age: 60
End: 2025-04-26
Payer: COMMERCIAL

## 2025-04-26 ENCOUNTER — HOSPITAL ENCOUNTER (EMERGENCY)
Dept: CARDIOLOGY | Facility: HOSPITAL | Age: 60
Discharge: HOME | End: 2025-04-26
Payer: COMMERCIAL

## 2025-04-26 VITALS
SYSTOLIC BLOOD PRESSURE: 174 MMHG | WEIGHT: 250 LBS | TEMPERATURE: 98.1 F | RESPIRATION RATE: 26 BRPM | DIASTOLIC BLOOD PRESSURE: 90 MMHG | OXYGEN SATURATION: 99 % | HEIGHT: 62 IN | BODY MASS INDEX: 46.01 KG/M2 | HEART RATE: 74 BPM

## 2025-04-26 DIAGNOSIS — R07.9 CHEST PAIN, UNSPECIFIED TYPE: Primary | ICD-10-CM

## 2025-04-26 LAB
ALBUMIN SERPL BCP-MCNC: 4.2 G/DL (ref 3.4–5)
ALP SERPL-CCNC: 109 U/L (ref 33–136)
ALT SERPL W P-5'-P-CCNC: 6 U/L (ref 7–45)
ANION GAP SERPL CALC-SCNC: 11 MMOL/L (ref 10–20)
AST SERPL W P-5'-P-CCNC: 14 U/L (ref 9–39)
BASOPHILS # BLD AUTO: 0.04 X10*3/UL (ref 0–0.1)
BASOPHILS NFR BLD AUTO: 0.5 %
BILIRUB SERPL-MCNC: 0.3 MG/DL (ref 0–1.2)
BUN SERPL-MCNC: 8 MG/DL (ref 6–23)
CALCIUM SERPL-MCNC: 9.3 MG/DL (ref 8.6–10.3)
CARDIAC TROPONIN I PNL SERPL HS: 4 NG/L (ref 0–13)
CARDIAC TROPONIN I PNL SERPL HS: 5 NG/L (ref 0–13)
CHLORIDE SERPL-SCNC: 106 MMOL/L (ref 98–107)
CO2 SERPL-SCNC: 26 MMOL/L (ref 21–32)
CREAT SERPL-MCNC: 0.8 MG/DL (ref 0.5–1.05)
EGFRCR SERPLBLD CKD-EPI 2021: 84 ML/MIN/1.73M*2
EOSINOPHIL # BLD AUTO: 0.06 X10*3/UL (ref 0–0.7)
EOSINOPHIL NFR BLD AUTO: 0.8 %
ERYTHROCYTE [DISTWIDTH] IN BLOOD BY AUTOMATED COUNT: 18.3 % (ref 11.5–14.5)
GLUCOSE SERPL-MCNC: 93 MG/DL (ref 74–99)
HCT VFR BLD AUTO: 39.2 % (ref 36–46)
HGB BLD-MCNC: 11.6 G/DL (ref 12–16)
IMM GRANULOCYTES # BLD AUTO: 0.01 X10*3/UL (ref 0–0.7)
IMM GRANULOCYTES NFR BLD AUTO: 0.1 % (ref 0–0.9)
LYMPHOCYTES # BLD AUTO: 3.12 X10*3/UL (ref 1.2–4.8)
LYMPHOCYTES NFR BLD AUTO: 41.7 %
MAGNESIUM SERPL-MCNC: 2.06 MG/DL (ref 1.6–2.4)
MCH RBC QN AUTO: 23.8 PG (ref 26–34)
MCHC RBC AUTO-ENTMCNC: 29.6 G/DL (ref 32–36)
MCV RBC AUTO: 81 FL (ref 80–100)
MONOCYTES # BLD AUTO: 0.58 X10*3/UL (ref 0.1–1)
MONOCYTES NFR BLD AUTO: 7.8 %
NEUTROPHILS # BLD AUTO: 3.67 X10*3/UL (ref 1.2–7.7)
NEUTROPHILS NFR BLD AUTO: 49.1 %
NRBC BLD-RTO: 0 /100 WBCS (ref 0–0)
PLATELET # BLD AUTO: 345 X10*3/UL (ref 150–450)
POTASSIUM SERPL-SCNC: 4.2 MMOL/L (ref 3.5–5.3)
PROT SERPL-MCNC: 7.3 G/DL (ref 6.4–8.2)
RBC # BLD AUTO: 4.87 X10*6/UL (ref 4–5.2)
SODIUM SERPL-SCNC: 139 MMOL/L (ref 136–145)
WBC # BLD AUTO: 7.5 X10*3/UL (ref 4.4–11.3)

## 2025-04-26 PROCEDURE — 84075 ASSAY ALKALINE PHOSPHATASE: CPT

## 2025-04-26 PROCEDURE — 93005 ELECTROCARDIOGRAM TRACING: CPT

## 2025-04-26 PROCEDURE — 2500000004 HC RX 250 GENERAL PHARMACY W/ HCPCS (ALT 636 FOR OP/ED)

## 2025-04-26 PROCEDURE — 84484 ASSAY OF TROPONIN QUANT: CPT

## 2025-04-26 PROCEDURE — 99285 EMERGENCY DEPT VISIT HI MDM: CPT | Mod: 25 | Performed by: EMERGENCY MEDICINE

## 2025-04-26 PROCEDURE — 96374 THER/PROPH/DIAG INJ IV PUSH: CPT

## 2025-04-26 PROCEDURE — 71046 X-RAY EXAM CHEST 2 VIEWS: CPT

## 2025-04-26 PROCEDURE — 85025 COMPLETE CBC W/AUTO DIFF WBC: CPT

## 2025-04-26 PROCEDURE — 36415 COLL VENOUS BLD VENIPUNCTURE: CPT

## 2025-04-26 PROCEDURE — 71046 X-RAY EXAM CHEST 2 VIEWS: CPT | Performed by: RADIOLOGY

## 2025-04-26 PROCEDURE — 83735 ASSAY OF MAGNESIUM: CPT

## 2025-04-26 PROCEDURE — 2500000005 HC RX 250 GENERAL PHARMACY W/O HCPCS

## 2025-04-26 RX ORDER — KETOROLAC TROMETHAMINE 30 MG/ML
15 INJECTION, SOLUTION INTRAMUSCULAR; INTRAVENOUS ONCE
Status: COMPLETED | OUTPATIENT
Start: 2025-04-26 | End: 2025-04-26

## 2025-04-26 RX ORDER — ACETAMINOPHEN 325 MG/1
650 TABLET ORAL EVERY 6 HOURS PRN
Qty: 30 TABLET | Refills: 0 | Status: SHIPPED | OUTPATIENT
Start: 2025-04-26

## 2025-04-26 RX ORDER — LIDOCAINE 560 MG/1
1 PATCH PERCUTANEOUS; TOPICAL; TRANSDERMAL ONCE
Status: DISCONTINUED | OUTPATIENT
Start: 2025-04-26 | End: 2025-04-26 | Stop reason: HOSPADM

## 2025-04-26 RX ORDER — METHOCARBAMOL 500 MG/1
500 TABLET, FILM COATED ORAL 2 TIMES DAILY
Qty: 20 TABLET | Refills: 0 | Status: SHIPPED | OUTPATIENT
Start: 2025-04-26 | End: 2025-05-06

## 2025-04-26 RX ADMIN — LIDOCAINE 1 PATCH: 4 PATCH TOPICAL at 14:07

## 2025-04-26 RX ADMIN — KETOROLAC TROMETHAMINE 15 MG: 30 INJECTION, SOLUTION INTRAMUSCULAR at 13:56

## 2025-04-26 ASSESSMENT — PAIN SCALES - GENERAL
PAINLEVEL_OUTOF10: 10 - WORST POSSIBLE PAIN
PAINLEVEL_OUTOF10: 0 - NO PAIN

## 2025-04-26 ASSESSMENT — COLUMBIA-SUICIDE SEVERITY RATING SCALE - C-SSRS
6. HAVE YOU EVER DONE ANYTHING, STARTED TO DO ANYTHING, OR PREPARED TO DO ANYTHING TO END YOUR LIFE?: NO
1. IN THE PAST MONTH, HAVE YOU WISHED YOU WERE DEAD OR WISHED YOU COULD GO TO SLEEP AND NOT WAKE UP?: NO
2. HAVE YOU ACTUALLY HAD ANY THOUGHTS OF KILLING YOURSELF?: NO

## 2025-04-26 ASSESSMENT — HEART SCORE
RISK FACTORS: >2 RISK FACTORS OR HX OF ATHEROSCLEROTIC DISEASE
HEART SCORE: 3
TROPONIN: LESS THAN OR EQUAL TO NORMAL LIMIT
HISTORY: SLIGHTLY SUSPICIOUS
ECG: NORMAL
AGE: 45-64

## 2025-04-26 ASSESSMENT — PAIN - FUNCTIONAL ASSESSMENT: PAIN_FUNCTIONAL_ASSESSMENT: 0-10

## 2025-04-26 NOTE — ED TRIAGE NOTES
Center and left chest./   has been bothering her for 1 day.   Hurts more when she touches the area, or makes certain movements.

## 2025-04-26 NOTE — ED PROVIDER NOTES
Emergency Department Provider Note          History of Present Illness     CC: Chest Pain     History provided by: Patient  Limitations to History: None    HPI:   Josemanuel Reinoso is a 59 y.o.female with PMH HTN, HLD, obesity, GERD, fibromyalgia, T2DM, neurogenic bladder, opiate dependence, postlaminectomy syndrome, RLS, bipolar disorder, gastroparesis, gout, pulmonary hypertension, IDRIS, antiphospholipid syndrome, heart failure, NSVT  presenting to the Emergency Department for chest pain.  Patient reports for the past 24 hours now has had left-sided breast/chest pain that she reports is worse with exertion or moving.  Has never had pain like this before in her life.  It reproducible with palpation of the left chest.  Has chronic shortness of breath with her asthma but reports her asthma has been doing well recently.  Denies abdominal pain, nausea, vomiting, or changes in urination/stool for us today.  Reports bilateral lower extremity edema has been doing well recently.    Records Reviewed: Recent available ED and inpatient notes reviewed in EMR.    PMHx/PSHx:  Per HPI.   - has a past medical history of Asthma, DM (diabetes mellitus) (Multi), GERD (gastroesophageal reflux disease), Hyperlipidemia, Hypertension, Personal history of other diseases of the circulatory system, and Personal history of other endocrine, nutritional and metabolic disease.  - has a past surgical history that includes MR angio head wo IV contrast (7/14/2019); MR angio neck wo IV contrast (7/14/2019); and MR angio head wo IV contrast (7/14/2019).  - has Allergic rhinitis; Antiphospholipid syndrome (Multi); Anxiety disorder; Asthma; Exacerbation of asthma ([facility]-HCC); Hypoxia; Atypical chest pain; Bipolar affective disorder, currently active (Multi); Calcium pyrophosphate deposition disease; Cauda equina syndrome (Multi); Cervical spondylosis without myelopathy; Chest discomfort; Chronic obstructive pulmonary disease (Multi); Complication of  surgical procedure; Cough; Degeneration of intervertebral disc of lumbar region; Discoid lupus erythematosus; Disorder of connective tissue (Multi); Disorder of sacrum; Dyspepsia; Dyspnea; Elevated antinuclear antibody (RUSSELL) level; Epigastric pain; Essential hypertension; Gastroenteritis; Gastroesophageal reflux disease; Gastroparesis due to DM (Multi); Gastroparesis; Gout; Heart failure; Hemorrhoids; History of bilateral knee arthroplasty; Hyperlipidemia; Hypertensive heart disease; Hypertrophy of nasal turbinates; Hypokalemia; Inflammation of right sacroiliac joint; Inflammatory arthritis; Inflammatory polyarthritis (Multi); Chronic constipation; Irritable bowel syndrome; 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) (Multi); Liver lesion; Lumbar radiculitis; Lumbar radiculopathy; Lymphedema of both lower extremities; Mechanical complication of internal joint prosthesis; Postoperative stiffness of total knee replacement; Moderate persistent asthma without complication ([facility]-HCC); Muscle weakness; Neurogenic bladder; Obesity, Class III, BMI 40-49.9 (morbid obesity) (Multi); Obstructive sleep apnea syndrome; Opiate dependence, continuous (Multi); Osteoarthritis of right knee; Fibromyalgia; Chronic pain; Abdominal pain; Backache; Coccydynia; Dislocation of prosthetic joint; Failed back surgical syndrome; Joint pain, knee; Low back pain; Lumbar spine pain; Pain due to knee joint prosthesis; Pain in right knee; Presence of left artificial hip joint; Presence of right artificial knee joint; Palpitations; Pneumonia; Post-COVID-19 condition; Postlaminectomy syndrome of lumbar region; Primary localized osteoarthritis of pelvic region and thigh; Pulmonary hypertension (Multi); Restless legs syndrome; Solitary pulmonary nodule; Spondylosis without myelopathy or radiculopathy, lumbosacral region; History of lumbar laminectomy; Status post revision of total replacement of right knee; Mixed collagen vascular disease  (Multi); Systemic lupus erythematosus (Multi); Nicotine use disorder; Type 2 diabetes mellitus; Vitamin D deficiency; Acute bilateral thoracic back pain; Acute pain of left shoulder; Acute pain of right shoulder; Candidiasis; PVC (premature ventricular contraction); Benign neoplasm of transverse colon; Chronic pericarditis ([facility]-HCC); Xerosis of skin; Posterior tibial tendon dysfunction (PTTD) of both lower extremities; Pain in toes of both feet; Flat foot; Discoloration of nail; Dermatophytosis of nail; Degenerative joint disease of both ankles and feet; Diabetic peripheral neuropathy (Multi); Nonsustained paroxysmal supraventricular tachycardia; Preop cardiovascular exam; Iron deficiency anemia; Generalized abdominal pain; and Gallbladder sludge on their problem list.    Medications:  Current Outpatient Medications   Medication Instructions    acetaminophen (Tylenol) 325 mg tablet TAKE 1 TO 2 TABLETS BY MOUTH EVERY 4 HOURS AS NEEDED FOR PAIN FOR UP TO 7 DAYS    acetaminophen (TYLENOL) 650 mg, oral, Every 6 hours PRN    albuterol 90 mcg/actuation inhaler inhale 1 to 2 puffs by mouth every 4 hours Inhalation    albuterol 2.5 mg, Every 6 hours PRN    aluminum hydrox-magnesium carb (Gaviscon)  mg/15 mL suspension oral suspension 16 mL, oral, Every 6 hours PRN    azelastine (Astelin) 137 mcg (0.1 %) nasal spray 1 spray, 2 times daily    bisacodyl (Dulcolax) 5 mg EC tablet take 1 tablet by mouth once daily if needed for constipation    Breztri Aerosphere 160-9-4.8 mcg/actuation HFA aerosol inhaler PLEASE SEE ATTACHED FOR DETAILED DIRECTIONS    capsaicin  cream (Capzasin-HP) 0.1 % cream cream 1 Application, Daily    carvedilol (COREG) 25 mg, oral, 2 times daily (morning and late afternoon)    cetirizine (ZYRTEC) 10 mg, Daily    cetirizine-pseudoephedrine (ZyrTEC-D) 5-120 mg 12 hr tablet 1 tablet, Daily RT    chlorthalidone (HYGROTON) 25 mg, oral, Daily RT    cholecalciferol (VITAMIN D3) 1,000 Units, Daily     ciclopirox (Penlac) 8 % solution 1 Application, Nightly    cromolyn (Opticrom) 4 % ophthalmic solution 1 drop, 4 times daily    cyanocobalamin (Vitamin B-12) 1,000 mcg tablet 1 tablet, Daily RT    cyclobenzaprine (Flexeril) 10 mg tablet TAKE 1 TABLET BY MOUTH EVERY 8 HOURS AS NEEDED FOR UP TO 5 DAYS    Dexilant 60 mg, oral, Daily, Swallow whole. Do not crush or chew.    dexlansoprazole (DEXILANT) 60 mg, oral, Daily, Do not crush or chew.    dilTIAZem (Cardizem) 30 mg immediate release tablet TAKE 1 TABLET BY MOUTH 2 TIMES A DAY AS NEEDED (PALPITATIONS).    docusate sodium (COLACE) 100 mg, 2 times daily PRN    empagliflozin (Jardiance) 10 mg 1 tablet, Daily with breakfast    famotidine (PEPCID) 40 mg, oral, Nightly    fluticasone (Flonase) 50 mcg/actuation nasal spray 1 spray, 2 times daily PRN    fluticasone-umeclidin-vilanter (Trelegy Ellipta) 200-62.5-25 mcg blister with device 1 puff, Daily RT    FreeStyle Jasmyne 3 Sensor device 1 kit, As needed    furosemide (LASIX) 20 mg, oral, Daily PRN    HYDROcodone-acetaminophen (Norco) 5-325 mg tablet 1 tablet, Every 6 hours PRN    hydroxychloroquine (PLAQUENIL) 200 mg, oral, 2 times daily    hyoscyamine 0.125 mg disintegrating tablet DISSOLVE 1 TABLET UNDER THE TONGUE EVERY 4 HOURS AS NEEDED    ipratropium-albuteroL (Duo-Neb) 0.5-2.5 mg/3 mL nebulizer solution 3 mL, Every 6 hours PRN    lidocaine (Lidoderm) 5 % patch 1 patch, Daily PRN    melatonin 5 mg, Daily PRN    meloxicam (MOBIC) 15 mg, Daily    methocarbamol (Robaxin) 500 mg tablet     methocarbamol (ROBAXIN) 500 mg, oral, 2 times daily    metoclopramide (Reglan) 5 mg tablet 1 tablet, Daily    miconazole (Micotin) 2 % cream 1 Application, As needed    montelukast (SINGULAIR) 10 mg, Daily PRN    naloxone (Narcan) 4 mg/0.1 mL nasal spray 1 spray, As needed    ondansetron (Zofran) 4 mg tablet 1 tablet, Every 6 hours PRN    oxybutynin XL (DITROPAN-XL) 10 mg, Daily    oxyCODONE-acetaminophen (Percocet) 5-325 mg tablet      Ozempic 0.25 mg, Weekly    promethazine-DM (Phenergan-DM) 6.25-15 mg/5 mL syrup 5 mL, As needed    Pulmicort Flexhaler 90 mcg/actuation inhaler 1-2 puffs, 2 times daily RT    rOPINIRole (REQUIP) 0.5 mg, Nightly    rosuvastatin (Crestor) 20 mg tablet Take 1 tablet (20 mg) by mouth once daily.    Spiriva Respimat 1.25 mcg/actuation inhaler 2 puffs, Daily    Symbicort 80-4.5 mcg/actuation inhaler 2 puffs, 2 times daily RT    Trelegy Ellipta 100-62.5-25 mcg blister with device 1 puff, Daily        Allergies:  Cephalexin, Azithromycin, Doxycycline, Erythromycin, Fluticasone propion-salmeterol, Ibuprofen, Lamotrigine, Metformin, Penicillins, Pregabalin, Estradiol, and Nickel    Social History:  - Tobacco:  reports that she has been smoking cigarettes. She has never been exposed to tobacco smoke. She has never used smokeless tobacco.   - Alcohol:  reports no history of alcohol use.   - Illicit Drugs:  reports no history of drug use.     ROS:  Per HPI.       Physical Exam     Triage Vitals:  T 36.7 °C (98.1 °F)  HR 80  BP (!) 181/91  RR 16  O2 98 % None (Room air)    General: Awake, alert, in no acute distress  Eyes: Gaze conjugate.  No scleral icterus or injection  HENT: Normo-cephalic, atraumatic. No stridor  CV: Regular rate, regular rhythm. Radial pulses 2+ bilaterally  Resp: Breathing non-labored, speaking in full sentences.  Clear to auscultation bilaterally  GI: Soft, non-distended, non-tender. No rebound or guarding  MSK/Extremities: No gross bony deformities. Moving all extremities  Skin: Warm. Appropriate color  Neuro: Alert. Oriented. Face symmetric. Speech is fluent.  Gross strength and sensation intact in b/l UE and LEs  Psych: Appropriate mood and affect          Leon Coma Scale Score: 15   HEART Score: 3                  Medical Decision Making & ED Course     EKG: EKG interpreted by myself. If applicable, please see ED Course for full interpretation.    Medical Decision Making   Josemanuel Reinoso is a  59 y.o.female presenting to the Emergency Department for left-sided chest wall pain.  On arrival, blood pressure 181/91, rest of vital signs within normal limits.  Febrile for us.  On examination, patient appears otherwise clinically well.  Clear heart and lung sounds bilaterally.  Does have notable left chest wall/left breast tenderness to palpation.  Breast free from erythema or other signs of acute infection today.  Will do basic labs, EKG, troponin, chest x-ray for further evaluation.  Symptomatically treated with Toradol in the ED.    Update: Labs notable for white count of 7.5, lower concern for systemic infectious process.  Hemoglobin stable 11.6, lower concern for acute blood loss anemia.  Chemistries relatively markable.  Troponin within normal limits.  EKG showed normal sinus rhythm.  Chest x-ray showed no acute cardiopulmonary process.  Heart score is 3 based on age and risk factors.  Lower concern for an acute cardiac event at this time.  After Toradol and lidocaine in the ED, patient will symptomatically improved.  Lower concern for acute coronary ischemia at this time.  Will be discharged home in stable condition with close PCP follow-up.       Diagnoses as of 04/26/25 1802   Chest pain, unspecified type       Independent Result Review and Interpretation: Relevant laboratory and radiographic results were reviewed and independently interpreted by myself.  As necessary, they are commented on in the ED Course.    Chronic conditions affecting the patient's care: As documented above in MDM.      Disposition   Discharge    Bon Hearn MD  Emergency Medicine PGY3      Procedures     Procedures ? [employer] last updated 4/26/2025 6:02 PM        Bon Hearn MD  Resident  04/26/25 1802

## 2025-04-29 PROBLEM — E66.01 MORBID OBESITY (MULTI): Status: ACTIVE | Noted: 2018-10-09

## 2025-04-29 RX ORDER — DEXLANSOPRAZOLE 60 MG/1
60 CAPSULE, DELAYED RELEASE ORAL DAILY
Qty: 30 CAPSULE | Refills: 2 | Status: SHIPPED | OUTPATIENT
Start: 2025-04-29 | End: 2025-05-29

## 2025-04-29 NOTE — ASSESSMENT & PLAN NOTE
Highly recommended taking PPI.    If no improvement of  symptoms after that will consider further testing.   Orders:    dexlansoprazole (Dexilant) 60 mg DR capsule; Take 1 capsule (60 mg) by mouth once daily. Do not crush or chew.    NM gastric emptying solid; Future

## 2025-04-29 NOTE — PROGRESS NOTES
"CC: abdominal pain,Nausea,vomiting.      History of Present Illness:   Josemanuel Reinoso is a 59 y.o. female with a PMH of HTN, HLD, obesity, GERD, COPD, fibromyalgia, T2DM, neurogenic bladder, opiate dependence, postlaminectomy syndrome, RLS, bipolar disorder, gastroparesis, gout, pulmonary hypertension, Chronic anemia ,IDRIS, antiphospholipid syndrome, heart failure and more.     ER visit on 03/26/2025 for N/V/abdominal pain.similar episode roughly 2 weeks ago where she was seen at the [place] emergency department.. Dc'd with  home-going initiation of PPI- omeprazole 20 mg.   Office Visit with Dr Rice on 1/28/2025 for ROSA. EGD and colonoscopy for new Hb drop.  Was  prescribed Dexilant.     Office visit On 4/30/25   Today he for nausea and vomiting with some epigastric pain. She reports she had emesis  about 10 time yesterday  after jello and sandwich, started with  abdominal pain after she ate. Throwing up food and sticky substance. Denies Hematemesis.   This morning  was able to egg roll, had pain but no vomiting. Has intermitted episodes of nausea and vomiting. She said she is following gastroparesis diet.    Normal BM, Denies diarrhea or constipation. No fever.  Takes regal once daily and did not took PPI  yet  She reports taking Mounjaro since a month however she does not think it is causing her symptoms worst since her symptoms were there before taking Mounjaro.    Seen by Dr Suresh on   2/28/2023 for GERD/gastroparesis.  :\"suspect that her symptoms are functional in nature ,therapeutic  trial of Donnatal elixir to see if this alleviates her abdominal pain as both dicyclomine and hyoscyamine were unsuccessful.\"  Seen by surgery on 3/12/25 for abdominal pain and GB sludge. Recommend repeat right upper quadrant ultrasound, which was normal.     Today here for recurrent complaint of abdominal pain with nausea and vomiting.   Reports  abdominal pain -epigastric, constant  8/10,squeezing ,spasm like pain. Reports " intermittent to constant nausea and vomiting 2-3 times , most of time food or fluid  she had. episodes about once a week. Takes zofran.  Denies hematemesis.   Reports heartburn, acid reflux on daily basis.  She was taking Dexilant for few weeks after her endoscopy however due to insurance she did not get the refill.  And she thinks her episodes of abdominal pain with nausea and vomiting flaring up with not taking the medication- Dexilant -which helped in the past.  She is taking omeprazole and she thinks omeprazole not helping with the symptoms.   Occasional dysphagia with pills, and some big portion of food.  Feels full with eating small portions.   Dx of gastroparesis  with Dr croft, about 5-6 years ago.  She has been taking Reglan intermittently, she thinks Reglan helps with some symptoms.  Take Zofran for nausea and vomiting as needed with some effect.     Reports constipation taking metamucil. Denies diarrhea, fever.  BM once daily soft /solid/brown.#  2-3 on [place] stool chart.  Denies hematochezia, melena.   Denies weight loss or appetite change.    Denies use of NSAID, aspirin or other blood thinners.  Denies any recent change in medication or use of new medication.    Denies family history of colon cancer, GI cancer celiac disease, inflammatory bowel disease, liver disease.  Denies alcohol use, smoking about half a pack. denies illicit drug use.  Has family history of gallbladder disease.  Denies  History of gallbladder or liver disease or hepatitis.  She denies any previous cholecystectomy, appendectomy  had previous colonoscopy that showed diverticulosis    HERNIA REPAIR HX 10/09/2018 -ventral/umbilical , X3     Review of Systems  ROS Negative unless otherwise stated above.      Past Medical/Surgical History  Medical History[1]   Surgical History[2]     Social History   reports that she has been smoking cigarettes. She has never been exposed to tobacco smoke. She has never used smokeless  tobacco. She reports that she does not drink alcohol and does not use drugs.     Family History  family history is not on file.     Allergies  RX Allergies[3]    Medications  Current Outpatient Medications   Medication Instructions    acetaminophen (Tylenol) 325 mg tablet TAKE 1 TO 2 TABLETS BY MOUTH EVERY 4 HOURS AS NEEDED FOR PAIN FOR UP TO 7 DAYS    acetaminophen (TYLENOL) 650 mg, oral, Every 6 hours PRN    albuterol 90 mcg/actuation inhaler inhale 1 to 2 puffs by mouth every 4 hours Inhalation    albuterol 2.5 mg, Every 6 hours PRN    azelastine (Astelin) 137 mcg (0.1 %) nasal spray 1 spray, 2 times daily    bisacodyl (Dulcolax) 5 mg EC tablet take 1 tablet by mouth once daily if needed for constipation    Breztri Aerosphere 160-9-4.8 mcg/actuation HFA aerosol inhaler PLEASE SEE ATTACHED FOR DETAILED DIRECTIONS    capsaicin  cream (Capzasin-HP) 0.1 % cream cream 1 Application, Daily    carvedilol (COREG) 25 mg, oral, 2 times daily (morning and late afternoon)    cetirizine (ZYRTEC) 10 mg, Daily    cetirizine-pseudoephedrine (ZyrTEC-D) 5-120 mg 12 hr tablet 1 tablet, Daily RT    chlorthalidone (HYGROTON) 25 mg, oral, Daily RT    cholecalciferol (VITAMIN D3) 1,000 Units, Daily    ciclopirox (Penlac) 8 % solution 1 Application, Nightly    cromolyn (Opticrom) 4 % ophthalmic solution 1 drop, 4 times daily    cyanocobalamin (Vitamin B-12) 1,000 mcg tablet 1 tablet, Daily RT    cyclobenzaprine (Flexeril) 10 mg tablet TAKE 1 TABLET BY MOUTH EVERY 8 HOURS AS NEEDED FOR UP TO 5 DAYS    dexlansoprazole (DEXILANT) 60 mg, oral, Daily, Do not crush or chew.    dilTIAZem (Cardizem) 30 mg immediate release tablet TAKE 1 TABLET BY MOUTH 2 TIMES A DAY AS NEEDED (PALPITATIONS).    docusate sodium (COLACE) 100 mg, 2 times daily PRN    empagliflozin (Jardiance) 10 mg 1 tablet, Daily with breakfast    famotidine (PEPCID) 40 mg, oral, Nightly    fluticasone (Flonase) 50 mcg/actuation nasal spray 1 spray, 2 times daily PRN     fluticasone-umeclidin-vilanter (Trelegy Ellipta) 200-62.5-25 mcg blister with device 1 puff, Daily RT    FreeStyle Jasmyne 3 Sensor device 1 kit, As needed    furosemide (LASIX) 20 mg, oral, Daily PRN    HYDROcodone-acetaminophen (Norco) 5-325 mg tablet 1 tablet, Every 6 hours PRN    hydroxychloroquine (PLAQUENIL) 200 mg, oral, 2 times daily    hyoscyamine 0.125 mg disintegrating tablet DISSOLVE 1 TABLET UNDER THE TONGUE EVERY 4 HOURS AS NEEDED    ipratropium-albuteroL (Duo-Neb) 0.5-2.5 mg/3 mL nebulizer solution 3 mL, Every 6 hours PRN    lidocaine (Lidoderm) 5 % patch 1 patch, Daily PRN    melatonin 5 mg, Daily PRN    meloxicam (MOBIC) 15 mg, Daily    methocarbamol (Robaxin) 500 mg tablet     methocarbamol (ROBAXIN) 500 mg, oral, 2 times daily    metoclopramide (Reglan) 5 mg tablet 1 tablet, Daily    miconazole (Micotin) 2 % cream 1 Application, As needed    montelukast (SINGULAIR) 10 mg, Daily PRN    naloxone (Narcan) 4 mg/0.1 mL nasal spray 1 spray, As needed    ondansetron (Zofran) 4 mg tablet 1 tablet, Every 6 hours PRN    oxybutynin XL (DITROPAN-XL) 10 mg, Daily    oxyCODONE-acetaminophen (Percocet) 5-325 mg tablet     Ozempic 0.25 mg, Weekly    promethazine-DM (Phenergan-DM) 6.25-15 mg/5 mL syrup 5 mL, As needed    Pulmicort Flexhaler 90 mcg/actuation inhaler 1-2 puffs, 2 times daily RT    rOPINIRole (REQUIP) 0.5 mg, Nightly    rosuvastatin (Crestor) 20 mg tablet Take 1 tablet (20 mg) by mouth once daily.    Spiriva Respimat 1.25 mcg/actuation inhaler 2 puffs, Daily    Symbicort 80-4.5 mcg/actuation inhaler 2 puffs, 2 times daily RT    Trelegy Ellipta 100-62.5-25 mcg blister with device 1 puff, Daily        Objective   Visit Vitals  /88   Pulse 103        Physical Exam   General: A&Ox3, NAD.  HEENT: No scleral icterus, no conjunctival pallor, normocephalic, atraumatic  Neck:  atraumatic, trachea midline, no JVD   CV: RRR. Positive S1/S2.   Resp: CTA bilaterally. No wheezing, rhonchi or rales.   GI: BSx4.  Obese , no distension, Soft, mild Epigastric tenderness to palpation, no rebound tenderness, no guarding, no rigidity, no discernible ascites   Extremities: no lower extremity edema.  Neuro: No focal deficits. no asterixis  Psych: pleasant and cooperative.  Skin:  Warm and dry, no jaundice      Lab Results   Component Value Date    WBC 7.5 04/26/2025    HGB 11.6 (L) 04/26/2025    HCT 39.2 04/26/2025    MCV 81 04/26/2025     04/26/2025       Chemistry    Lab Results   Component Value Date/Time     04/26/2025 1340    K 4.2 04/26/2025 1340     04/26/2025 1340    CO2 26 04/26/2025 1340    BUN 8 04/26/2025 1340    CREATININE 0.80 04/26/2025 1340    Lab Results   Component Value Date/Time    CALCIUM 9.3 04/26/2025 1340    ALKPHOS 109 04/26/2025 1340    AST 14 04/26/2025 1340    ALT 6 (L) 04/26/2025 1340    BILITOT 0.3 04/26/2025 1340        Lab:   ESR 27 ,CRP 0.9 (4/3/25).   3/26/25 : WBC 6.28 RBC 4.82 Hemoglobin 11.2(!) Hematocrit 36.8 MCV 76.3(!) MCH 23.2(!) MCHC 30.4(!) RDW-CV 19.5(!) Platelet Count 361,  Protein, Total 7.2 Albumin 4.0 Calcium 9.5 Bilirubin, Total <0.2(!) Alkaline Phosphatase 147(!) AST 14   ALT 8 Glucose 85 BUN 5(!) Creatinine 0.88 Sodium 144 Potassium 3.7 Chloride 107 CO2 26 Anion Gap 11 ,Lipase: 24  1/14/25Iron 51, TIBC 392, Sat%13 ,ferritin 81.1(7/22/24)  EGD : 2/6/2025  Grade A esophagitis in the GE junction  Non-obstructing Schatzki ring in the GE junction  The stomach appeared normal.  The duodenum appeared normal. Performed random biopsy to rule out celiac disease.  EGD 6/9/23  - Normal esophagus.  - Z-line regular, 38 cm from the incisors.   - Erythematous mucosa in the antrum.  - Normal examined duodenum.  -Biopsies were taken with a cold forceps for Helicobacter pylori testing.  Biopsy negative for histopathologic changes.   Colonoscopy:   2/6/2025 mpression  Subcentimeter polyp in the transverse colon was removed with cold snare  Scattered diverticulosis of moderate  severity in the descending colon and sigmoid colon  Small (grade 1) hemorrhoids  Repeat colonoscopy in 5 years, due: 2/5/2030   Will order VCE for further evaluation of anemia   Biopsy with Tubular adenoma.   6/9/2023 : One 12 mm polyp in the transverse colon, removed   CT ABDOMEN AND PELVIS WITH IV CONTRAST Mar  6 2025 :GI tract: No dilation or wall thickening. Normal appendix. Biliary: No bile duct dilation.  No calcified stones or gallbladder   dilation.   US ABD RIGHT UPPER QUADRANT Mar 26 2025 : Normal sonographic appearance of the liver with no hepatic lesion. No cholelithiasis or biliary ductal dilatation.   NM GASTRIC EMPTYING SOLID 4/18/2024 : Normal gastric emptying of a radiolabeled solid meal.     Assessment & Plan  Epigastric pain  Highly recommended taking PPI.    If no improvement of  symptoms after that will consider further testing.   Orders:    dexlansoprazole (Dexilant) 60 mg DR capsule; Take 1 capsule (60 mg) by mouth once daily. Do not crush or chew.    NM gastric emptying solid; Future    Gastroesophageal reflux disease without esophagitis  Life style to prevent GERD as  Discussed earlier.     Nausea and vomiting, unspecified vomiting type  Possible due to gastritis or Gastroparesis.   Take Dexilant 60 mg once daily before breakfast  Orders:    NM gastric emptying solid; Future    metoclopramide (Reglan) 5 mg tablet; Take 1 tablet (5 mg) by mouth 4 times a day.    Gastroparesis  Follow gastroparesis diet  Take Reglan 5 mg 4 times per day.  Reports any side effects of Reglan.   Do not take ropinirole or promethazine while taking Reglan  Complete gastric emptying study as ordered    Will consider surgical evaluation as you requested after results of gastric emptying study.           Rebecca Metzger, APRN-CNP    The note was created using voice recognition transcription software. Despite proofreading, unintentional typographical errors may be present. Please contact the GI office with any  questions or concerns.          [1]   Past Medical History:  Diagnosis Date    Asthma     DM (diabetes mellitus) (Multi)     GERD (gastroesophageal reflux disease)     Hyperlipidemia     Hypertension     Personal history of other diseases of the circulatory system     History of hypertension    Personal history of other endocrine, nutritional and metabolic disease     History of diabetes mellitus   [2]   Past Surgical History:  Procedure Laterality Date    MR HEAD ANGIO WO IV CONTRAST  7/14/2019    MR HEAD ANGIO WO IV CONTRAST [facility]    MR HEAD ANGIO WO IV CONTRAST  7/14/2019    MR HEAD ANGIO WO IV CONTRAST [facility]    MR NECK ANGIO WO IV CONTRAST  7/14/2019    MR NECK ANGIO WO IV CONTRAST [facility]   [3]   Allergies  Allergen Reactions    Cephalexin GI Upset and Swelling    Azithromycin GI Upset     c/o severe stomach burning    Doxycycline Diarrhea and Nausea/vomiting    Erythromycin GI Upset and Nausea/vomiting    Fluticasone Propion-Salmeterol Other     Sore throat / thrush     Ibuprofen GI Upset    Lamotrigine Swelling     Had bilateral swelling of feet and lower legs. The swelling subsided and is now gone 2 days after she stopped Lamictal    Metformin GI Upset    Penicillins Hives and Rash    Pregabalin Hives    Estradiol Itching and Rash    Nickel Rash

## 2025-04-30 ENCOUNTER — OFFICE VISIT (OUTPATIENT)
Dept: GASTROENTEROLOGY | Facility: CLINIC | Age: 60
End: 2025-04-30
Payer: COMMERCIAL

## 2025-04-30 VITALS
WEIGHT: 251 LBS | SYSTOLIC BLOOD PRESSURE: 123 MMHG | HEIGHT: 62 IN | HEART RATE: 103 BPM | DIASTOLIC BLOOD PRESSURE: 88 MMHG | BODY MASS INDEX: 46.19 KG/M2

## 2025-04-30 DIAGNOSIS — R11.2 NAUSEA AND VOMITING, UNSPECIFIED VOMITING TYPE: Primary | ICD-10-CM

## 2025-04-30 DIAGNOSIS — R10.13 EPIGASTRIC PAIN: ICD-10-CM

## 2025-04-30 DIAGNOSIS — K31.84 GASTROPARESIS: ICD-10-CM

## 2025-04-30 DIAGNOSIS — K21.9 GASTROESOPHAGEAL REFLUX DISEASE WITHOUT ESOPHAGITIS: ICD-10-CM

## 2025-04-30 PROCEDURE — 3008F BODY MASS INDEX DOCD: CPT | Performed by: NURSE PRACTITIONER

## 2025-04-30 PROCEDURE — 3079F DIAST BP 80-89 MM HG: CPT | Performed by: NURSE PRACTITIONER

## 2025-04-30 PROCEDURE — 99214 OFFICE O/P EST MOD 30 MIN: CPT | Performed by: NURSE PRACTITIONER

## 2025-04-30 PROCEDURE — 3074F SYST BP LT 130 MM HG: CPT | Performed by: NURSE PRACTITIONER

## 2025-04-30 RX ORDER — METOCLOPRAMIDE 5 MG/1
5 TABLET ORAL 4 TIMES DAILY
Qty: 120 TABLET | Refills: 0 | Status: SHIPPED | OUTPATIENT
Start: 2025-04-30 | End: 2025-05-30

## 2025-04-30 NOTE — PATIENT INSTRUCTIONS
Follow gastroparesis diet  Take Dexilant 60 mg once daily before breakfast  Take Reglan 5 mg 4 times per day.  Reports any side effects of Reglan.   Do not take ropinirole or promethazine while taking Reglan  Complete gastric emptying study as ordered    Will consider surgical evaluation as you requested after results of gastric emptying study.

## 2025-04-30 NOTE — ASSESSMENT & PLAN NOTE
Follow gastroparesis diet  Take Reglan 5 mg 4 times per day.  Reports any side effects of Reglan.   Do not take ropinirole or promethazine while taking Reglan  Complete gastric emptying study as ordered    Will consider surgical evaluation as you requested after results of gastric emptying study.

## 2025-05-01 ENCOUNTER — TELEPHONE (OUTPATIENT)
Dept: GASTROENTEROLOGY | Facility: CLINIC | Age: 60
End: 2025-05-01

## 2025-05-01 NOTE — TELEPHONE ENCOUNTER
Patient called and stated that her preferred pharmacy hasn't received prescriptions for the following medications:  dexlansoprazole (Dexilant) 60 mg DR capsule;  metoclopramide (Reglan) 5 mg tablet.  Please review. Thank you.

## 2025-05-02 LAB
ATRIAL RATE: 80 BPM
P AXIS: 33 DEGREES
P OFFSET: 195 MS
P ONSET: 134 MS
PR INTERVAL: 168 MS
Q ONSET: 218 MS
QRS COUNT: 13 BEATS
QRS DURATION: 92 MS
QT INTERVAL: 388 MS
QTC CALCULATION(BAZETT): 447 MS
QTC FREDERICIA: 427 MS
R AXIS: -1 DEGREES
T AXIS: 24 DEGREES
T OFFSET: 412 MS
VENTRICULAR RATE: 80 BPM

## 2025-05-12 ENCOUNTER — APPOINTMENT (OUTPATIENT)
Dept: GASTROENTEROLOGY | Facility: HOSPITAL | Age: 60
End: 2025-05-12
Payer: COMMERCIAL

## 2025-05-13 ENCOUNTER — OFFICE VISIT (OUTPATIENT)
Dept: CARDIOLOGY | Facility: CLINIC | Age: 60
End: 2025-05-13
Payer: COMMERCIAL

## 2025-05-13 ENCOUNTER — ANCILLARY PROCEDURE (OUTPATIENT)
Dept: CARDIOLOGY | Facility: CLINIC | Age: 60
End: 2025-05-13
Payer: COMMERCIAL

## 2025-05-13 VITALS
DIASTOLIC BLOOD PRESSURE: 86 MMHG | HEART RATE: 80 BPM | HEIGHT: 62 IN | BODY MASS INDEX: 46.93 KG/M2 | OXYGEN SATURATION: 96 % | WEIGHT: 255 LBS | SYSTOLIC BLOOD PRESSURE: 128 MMHG

## 2025-05-13 DIAGNOSIS — R00.2 PALPITATIONS: ICD-10-CM

## 2025-05-13 DIAGNOSIS — I47.10 NONSUSTAINED PAROXYSMAL SUPRAVENTRICULAR TACHYCARDIA: ICD-10-CM

## 2025-05-13 DIAGNOSIS — I31.9 CHRONIC PERICARDITIS, UNSPECIFIED COMPLICATION STATUS, UNSPECIFIED TYPE (HHS-HCC): ICD-10-CM

## 2025-05-13 DIAGNOSIS — I47.10 NONSUSTAINED PAROXYSMAL SUPRAVENTRICULAR TACHYCARDIA: Primary | ICD-10-CM

## 2025-05-13 LAB — BODY SURFACE AREA: 2.25 M2

## 2025-05-13 PROCEDURE — 99212 OFFICE O/P EST SF 10 MIN: CPT | Performed by: PHYSICIAN ASSISTANT

## 2025-05-13 PROCEDURE — 99214 OFFICE O/P EST MOD 30 MIN: CPT | Performed by: PHYSICIAN ASSISTANT

## 2025-05-13 PROCEDURE — 3008F BODY MASS INDEX DOCD: CPT | Performed by: PHYSICIAN ASSISTANT

## 2025-05-13 PROCEDURE — 93242 EXT ECG>48HR<7D RECORDING: CPT

## 2025-05-13 PROCEDURE — 3074F SYST BP LT 130 MM HG: CPT | Performed by: PHYSICIAN ASSISTANT

## 2025-05-13 PROCEDURE — 3079F DIAST BP 80-89 MM HG: CPT | Performed by: PHYSICIAN ASSISTANT

## 2025-05-13 NOTE — PROGRESS NOTES
Chief Complaint:   Follow-up (6 month)     History Of Present Illness:    05/13/25  Patient reports increased incidence of palpitations with known history of SVT previously documented on extended Holter.  Patient denies chest pain, chest pressure, dyspnea on exertion, shortness of breath at rest, diaphoresis, nausea/vomiting, back pain, headache, lightheadedness, dizziness, syncope or presyncopal episodes, active bleeding signs or symptoms, excessive weight gain, muscle or joint pain, claudication.    1/24/25  Iron studies display essential normalization of iron deficiency anemia.  Patient reports intermittent episodic palpitations consistent with documented nonsustained SVT noted on recent extended Holter monitor.    6/13/24  Patient recently presented to ED once again with complaints of atypical chest pain, acute ischemic workup was negative including negative serial troponin levels, and no ischemic findings on EKG.  Patient reports persistent episodic palpitations and tachycardic pulse rates likely correlating with previously noted, brief episodes of nonsustained SVT vs atrial tachycardia.  Patient is tentatively scheduled for elective right knee TKA revision, for which she will be cleared without reservations from a cardiovascular standpoint.  Patient denies  dyspnea on exertion, shortness of breath at rest, diaphoresis, nausea/vomiting, back pain, headache, lightheadedness, dizziness, syncope or presyncopal episodes, active bleeding signs or symptoms, excessive weight gain, muscle or joint pain, claudication.    3/21/24  Josemanuel Reinoso is a 59 y.o. female presenting with chronic and recurrent atypical chest pain.  Patient was recently hospitalized with these symptoms, acute ischemia was ruled out, therefore she underwent coronary CTA displaying normal coronary anatomy without evidence of underlying CAD.  Previously patient underwent LHC with Dr. Pearson in 2017 displaying normal coronary anatomy at that time as  "well.  Chest pain is somewhat pleuritic in nature, and nearly constant.  Due to patient's underlying autoimmunity and diagnosis of SLE I do feel this may represent symptoms of pericarditis.  Results of all lab tests, cardiac imaging, and coronary evaluation discussed with patient on exam today.     Last Recorded Vitals:  Vitals:    05/13/25 1021   BP: 128/86   BP Location: Left arm   Patient Position: Sitting   BP Cuff Size: Adult   Pulse: 80   SpO2: 96%   Weight: 116 kg (255 lb)   Height: 1.575 m (5' 2\")       Past Medical History:  She has a past medical history of Asthma, DM (diabetes mellitus) (Multi), GERD (gastroesophageal reflux disease), Hyperlipidemia, Hypertension, Personal history of other diseases of the circulatory system, and Personal history of other endocrine, nutritional and metabolic disease.    Past Surgical History:  She has a past surgical history that includes MR angio head wo IV contrast (7/14/2019); MR angio neck wo IV contrast (7/14/2019); and MR angio head wo IV contrast (7/14/2019).      Social History:  She reports that she has been smoking cigarettes. She has never been exposed to tobacco smoke. She has never used smokeless tobacco. She reports that she does not drink alcohol and does not use drugs.    Family History:  No family history on file.     Allergies:  Cephalexin, Azithromycin, Doxycycline, Erythromycin, Fluticasone propion-salmeterol, Ibuprofen, Lamotrigine, Metformin, Penicillins, Pregabalin, Estradiol, and Nickel    Outpatient Medications:  Current Outpatient Medications   Medication Instructions    acetaminophen (Tylenol) 325 mg tablet TAKE 1 TO 2 TABLETS BY MOUTH EVERY 4 HOURS AS NEEDED FOR PAIN FOR UP TO 7 DAYS    acetaminophen (TYLENOL) 650 mg, oral, Every 6 hours PRN    albuterol 90 mcg/actuation inhaler inhale 1 to 2 puffs by mouth every 4 hours Inhalation    albuterol 2.5 mg, Every 6 hours PRN    azelastine (Astelin) 137 mcg (0.1 %) nasal spray 1 spray, 2 times daily "    bisacodyl (Dulcolax) 5 mg EC tablet take 1 tablet by mouth once daily if needed for constipation    Breztri Aerosphere 160-9-4.8 mcg/actuation HFA aerosol inhaler PLEASE SEE ATTACHED FOR DETAILED DIRECTIONS    capsaicin  cream (Capzasin-HP) 0.1 % cream cream 1 Application, Daily    carvedilol (COREG) 25 mg, oral, 2 times daily (morning and late afternoon)    cetirizine (ZYRTEC) 10 mg, Daily    cetirizine-pseudoephedrine (ZyrTEC-D) 5-120 mg 12 hr tablet 1 tablet, Daily RT    chlorthalidone (HYGROTON) 25 mg, oral, Daily RT    cholecalciferol (VITAMIN D3) 1,000 Units, Daily    ciclopirox (Penlac) 8 % solution 1 Application, Nightly    cromolyn (Opticrom) 4 % ophthalmic solution 1 drop, 4 times daily    cyanocobalamin (Vitamin B-12) 1,000 mcg tablet 1 tablet, Daily RT    cyclobenzaprine (Flexeril) 10 mg tablet TAKE 1 TABLET BY MOUTH EVERY 8 HOURS AS NEEDED FOR UP TO 5 DAYS    dexlansoprazole (DEXILANT) 60 mg, oral, Daily, Do not crush or chew.    dilTIAZem (Cardizem) 30 mg immediate release tablet TAKE 1 TABLET BY MOUTH 2 TIMES A DAY AS NEEDED (PALPITATIONS).    docusate sodium (COLACE) 100 mg, 2 times daily PRN    empagliflozin (Jardiance) 10 mg 1 tablet, Daily with breakfast    famotidine (PEPCID) 40 mg, oral, Nightly    fluticasone (Flonase) 50 mcg/actuation nasal spray 1 spray, 2 times daily PRN    FreeStyle Jasmyne 3 Sensor device 1 kit, As needed    furosemide (LASIX) 20 mg, oral, Daily PRN    hydroxychloroquine (PLAQUENIL) 200 mg, oral, 2 times daily    hyoscyamine 0.125 mg disintegrating tablet DISSOLVE 1 TABLET UNDER THE TONGUE EVERY 4 HOURS AS NEEDED    ipratropium-albuteroL (Duo-Neb) 0.5-2.5 mg/3 mL nebulizer solution 3 mL, Every 6 hours PRN    lidocaine (Lidoderm) 5 % patch 1 patch, Daily PRN    melatonin 5 mg, Daily PRN    meloxicam (MOBIC) 15 mg, Daily    methocarbamol (Robaxin) 500 mg tablet     methocarbamol (ROBAXIN) 500 mg, oral, 2 times daily    metoclopramide (REGLAN) 5 mg, oral, 4 times daily     miconazole (Micotin) 2 % cream 1 Application, As needed    montelukast (SINGULAIR) 10 mg, Daily PRN    naloxone (Narcan) 4 mg/0.1 mL nasal spray 1 spray, As needed    ondansetron (Zofran) 4 mg tablet 1 tablet, Every 6 hours PRN    oxyBUTYnin XL (DITROPAN-XL) 10 mg, Daily    oxyCODONE-acetaminophen (Percocet) 5-325 mg tablet     Pulmicort Flexhaler 90 mcg/actuation inhaler 1-2 puffs, 2 times daily RT    rosuvastatin (Crestor) 20 mg tablet Take 1 tablet (20 mg) by mouth once daily.       Physical Exam:  Constitutional: awake and alert, oriented ×3, no apparent distress  Skin: warm, dry, good turgor no obvious lesions  Eyes: pupils equal, round, reactive to light, conjunctiva pink and noninjected, no discharge  HENT: normocephalic and atraumatic, mucous membranes moist, trachea midline with no masses/goiter  Cardiovascular: S1/S2 regular, no murmur no rubs/gallops, no carotid bruits, no JVD  Pulmonary: symmetrical chest expansion, lungs are clear to auscultation bilaterally, no wheezes/rales/rhonchi, normal effort  Abdomen: nontender, nondistended, active bowel sounds, no ascites  Extremities: no cyanosis, clubbing, no LE edema no lesions; palpable pedal pulses  Neurologic: cranial nerves II - XII grossly intact, stable gait, no tremor       Last Labs:  CBC -  Lab Results   Component Value Date    WBC 7.5 04/26/2025    HGB 11.6 (L) 04/26/2025    HCT 39.2 04/26/2025    MCV 81 04/26/2025     04/26/2025       CMP -  Lab Results   Component Value Date    CALCIUM 9.3 04/26/2025    PROT 7.3 04/26/2025    ALBUMIN 4.2 04/26/2025    AST 14 04/26/2025    ALT 6 (L) 04/26/2025    ALKPHOS 109 04/26/2025    BILITOT 0.3 04/26/2025       LIPID PANEL -   Lab Results   Component Value Date    CHOL 208 (H) 04/29/2024    TRIG 154 (H) 04/29/2024    HDL 44.7 04/29/2024    CHHDL 4.7 04/29/2024    VLDL 31 04/29/2024    NHDL 163 (H) 04/29/2024       RENAL FUNCTION PANEL -   Lab Results   Component Value Date    GLUCOSE 93 04/26/2025      04/26/2025    K 4.2 04/26/2025     04/26/2025    CO2 26 04/26/2025    ANIONGAP 11 04/26/2025    BUN 8 04/26/2025    CREATININE 0.80 04/26/2025    CALCIUM 9.3 04/26/2025    ALBUMIN 4.2 04/26/2025        Lab Results   Component Value Date    BNP 92 06/05/2024    HGBA1C 6.8 (H) 03/14/2024       Last Cardiology Tests:  ECG:  ECG 12 Lead 03/14/2024 (Preliminary)      Echo:  Transthoracic Echo Complete 03/15/2024  1. Left ventricular systolic function is low normal with a 50-55% estimated ejection fraction.   2. There is concentric left ventricular hypertrophy.   3. RVSP within normal limits.    Ejection Fractions:  EF   Date/Time Value Ref Range Status   03/15/2024 01:38 PM 49 %        Cath:  No results found for this or any previous visit from the past 1095 days.      Stress Test:  NUCLEAR STRESS TEST 07/31/2023      Cardiac Imaging:  CT angio coronary arteries w C EVAL of cardiac structure morphology 03/15/2024  1. Right dominant system.  2. Study is limited by motion and misregistration artifact, however  there appears to be normal coronary anatomy without clear evidence of  atherosclerotic changes or stenotic disease.  3. Small hiatal hernia.      Assessment/Plan   Problem List Items Addressed This Visit           ICD-10-CM       Cardiac and Vasculature    Palpitations R00.2    Relevant Orders    Holter Or Event Cardiac Monitor    Chronic pericarditis ([facility]) I31.9    Nonsustained paroxysmal supraventricular tachycardia - Primary I47.10    Relevant Orders    Holter Or Event Cardiac Monitor       -No evidence of coronary disease on recent coronary CTA, [facility] in 2017 displaying no coronary disease    -7 day Holter for further arrhythmic evaluation    -F/U 6 months      Jose Arias PA-C

## 2025-05-14 ENCOUNTER — APPOINTMENT (OUTPATIENT)
Dept: RHEUMATOLOGY | Facility: CLINIC | Age: 60
End: 2025-05-14
Payer: COMMERCIAL

## 2025-05-14 ENCOUNTER — TELEPHONE (OUTPATIENT)
Dept: CARDIOLOGY | Facility: CLINIC | Age: 60
End: 2025-05-14

## 2025-05-14 ENCOUNTER — APPOINTMENT (OUTPATIENT)
Dept: CARDIOLOGY | Facility: CLINIC | Age: 60
End: 2025-05-14
Payer: COMMERCIAL

## 2025-05-14 VITALS
SYSTOLIC BLOOD PRESSURE: 151 MMHG | BODY MASS INDEX: 46.93 KG/M2 | WEIGHT: 255 LBS | DIASTOLIC BLOOD PRESSURE: 82 MMHG | HEART RATE: 80 BPM | HEIGHT: 62 IN

## 2025-05-14 DIAGNOSIS — M19.90 INFLAMMATORY ARTHRITIS: Primary | ICD-10-CM

## 2025-05-14 DIAGNOSIS — R76.8 ANA POSITIVE: ICD-10-CM

## 2025-05-14 DIAGNOSIS — D50.8 OTHER IRON DEFICIENCY ANEMIA: ICD-10-CM

## 2025-05-14 LAB
APPEARANCE UR: CLEAR
BILIRUB UR QL STRIP: NEGATIVE
COLOR UR: YELLOW
GLUCOSE UR QL STRIP: NEGATIVE
HGB UR QL STRIP: NEGATIVE
KETONES UR QL STRIP: NEGATIVE
LEUKOCYTE ESTERASE UR QL STRIP: NEGATIVE
NITRITE UR QL STRIP: NEGATIVE
PH UR STRIP: 7 [PH] (ref 5–8)
PROT UR QL STRIP: NEGATIVE
SP GR UR STRIP: 1.01 (ref 1–1.03)

## 2025-05-14 PROCEDURE — 3008F BODY MASS INDEX DOCD: CPT | Performed by: INTERNAL MEDICINE

## 2025-05-14 PROCEDURE — 3077F SYST BP >= 140 MM HG: CPT | Performed by: INTERNAL MEDICINE

## 2025-05-14 PROCEDURE — 3079F DIAST BP 80-89 MM HG: CPT | Performed by: INTERNAL MEDICINE

## 2025-05-14 PROCEDURE — 99213 OFFICE O/P EST LOW 20 MIN: CPT | Performed by: INTERNAL MEDICINE

## 2025-05-14 ASSESSMENT — PAIN SCALES - GENERAL: PAINLEVEL_OUTOF10: 9

## 2025-05-14 NOTE — TELEPHONE ENCOUNTER
"Patient scheduled for \"Block, caudal epidural steroid injection under fluoroscopy & injection interlaminar epidural steroid lumbar\" on 5/19/25 with Dr. Rivas at [facility]. Cardiac risk assessment?    Last OV: 5/13/25  "

## 2025-05-14 NOTE — PROGRESS NOTES
She complains of generalized pain and palpitations.She is currently wearing an event monitor.  She notes that her musculoskeletal pain is less intense since she has been off for  Infusions for a while.  She does have previously but Saphnelo helped to control the musculoskeletal pain but if she misses an infusion, the musculoskeletal pain was much worse.  She has a history of anemia despite taking iron supplements.  She is requesting an iron infusion.    Examination shows a large body habitus.  The lungs are clear to auscultation.  The heart has a regular rate and rhythm with normal heart sounds.  There is significant monitor in place on the anterior chest.  Abdomen is protuberant but benign.  Extremities have diffuse puffiness without pitting.  Musculoskeletal examination shows diffuse tenderness over the upper and lower extremities as especially over the knees.  There are no joint effusions.    Laboratory (4/26/2025) WBC 7.5, hemoglobin 11.6, hematocrit 39.2, MCV 81, MCHC 29.6, platelets 345, BUN 8, creatinine 0.80, glucose 93, sodium 139, potassium 4.2, chloride 106, bicarbonate 26, calcium 9.3, albumin 4.2, alk phosphatase 109, AST 14, ALT 6.    She has history of hypertension, hyperlipidemia, type 2 diabetes mellitus, neurogenic bladder, post lumbar laminectomy syndrome, opiate dependence, restless leg syndrome, bipolar disorder, gastroparesis, pulmonary hypertension, obstructive sleep apnea, antiphospholipid syndrome, congestive heart failure, nonsustained ventricular tachycardia, gout, fibromyalgia.  The musculoskeletal symptoms seem to be most consistent with fibromyalgia.  She has a normocytic anemia.    She was referred to hematology for evaluation of the anemia.  She is to have laboratory for ferritin, iron panel, urinalysis, spot urine protein/creatinine ratio, RUSSELL panel, C3, C4.  Consider resuming hydroxychloroquine therapy.  She is to return at the next available office appointment.

## 2025-05-16 LAB
ANA PAT SER IF-IMP: ABNORMAL
ANA SER QL IF: POSITIVE
ANA TITR SER IF: ABNORMAL TITER
C3 SERPL-MCNC: 185 MG/DL (ref 83–193)
C4 SERPL-MCNC: 55 MG/DL (ref 15–57)
DSDNA AB SER-ACNC: 1 IU/ML
ENA RNP AB SER-ACNC: ABNORMAL AI
ENA SM AB SER IA-ACNC: ABNORMAL AI
ENA SM+RNP AB SER IA-ACNC: ABNORMAL AI
FERRITIN SERPL-MCNC: 32 NG/ML (ref 16–232)
IRON SATN MFR SERPL: 12 % (CALC) (ref 16–45)
IRON SERPL-MCNC: 45 MCG/DL (ref 45–160)
LABORATORY COMMENT REPORT: ABNORMAL
NUCLEOSOME AB SER IA-ACNC: ABNORMAL AI
RETICS #: NORMAL CELLS/UL (ref 20000–80000)
RETICS/RBC NFR AUTO: 1.4 %
TIBC SERPL-MCNC: 363 MCG/DL (CALC) (ref 250–450)
VIT B12 SERPL-MCNC: 257 PG/ML (ref 200–1100)

## 2025-05-22 ENCOUNTER — HOSPITAL ENCOUNTER (OUTPATIENT)
Dept: GASTROENTEROLOGY | Facility: HOSPITAL | Age: 60
Discharge: HOME | End: 2025-05-22
Payer: COMMERCIAL

## 2025-05-22 DIAGNOSIS — D50.8 OTHER IRON DEFICIENCY ANEMIA: ICD-10-CM

## 2025-05-22 DIAGNOSIS — R10.13 EPIGASTRIC PAIN: ICD-10-CM

## 2025-05-22 PROCEDURE — 91110 GI TRC IMG INTRAL ESOPH-ILE: CPT

## 2025-05-22 PROCEDURE — 2720000007 HC OR 272 NO HCPCS

## 2025-05-27 DIAGNOSIS — R11.2 NAUSEA AND VOMITING, UNSPECIFIED VOMITING TYPE: ICD-10-CM

## 2025-05-27 RX ORDER — METOCLOPRAMIDE 5 MG/1
5 TABLET ORAL 4 TIMES DAILY
Qty: 120 TABLET | Refills: 0 | Status: SHIPPED | OUTPATIENT
Start: 2025-05-27 | End: 2025-06-26

## 2025-06-02 ENCOUNTER — TELEPHONE (OUTPATIENT)
Dept: GASTROENTEROLOGY | Facility: CLINIC | Age: 60
End: 2025-06-02

## 2025-06-02 NOTE — TELEPHONE ENCOUNTER
Called the patient and left a message letting them know that their appointment with Dr. Rice on 6/3/2025 is cancelled, because Dr. Rice doesn't offer virtual appointments in the morning. Patient is still scheduled to see TRISHA Fernandez on 6/23/2025.

## 2025-06-02 NOTE — TELEPHONE ENCOUNTER
Upon calling the patient to confirm their appointment, the patient stated that she would only be available for a phone visit, if that was a possibility. Patient also stated that she had another appointment at the same time, and she had to attend it, so would be open to coming in earlier. Please advise. Thank you.

## 2025-06-03 ENCOUNTER — APPOINTMENT (OUTPATIENT)
Dept: GASTROENTEROLOGY | Facility: CLINIC | Age: 60
End: 2025-06-03
Payer: COMMERCIAL

## 2025-06-04 RX ORDER — HEPARIN 100 UNIT/ML
500 SYRINGE INTRAVENOUS AS NEEDED
OUTPATIENT
Start: 2025-06-04

## 2025-06-04 RX ORDER — HEPARIN SODIUM,PORCINE/PF 10 UNIT/ML
50 SYRINGE (ML) INTRAVENOUS AS NEEDED
OUTPATIENT
Start: 2025-06-04

## 2025-06-04 NOTE — PROGRESS NOTES
[facility] requesting updated venous access orders for pt's Saphenlo infusions, entering venous access therapy plan

## 2025-06-05 ENCOUNTER — TELEPHONE (OUTPATIENT)
Dept: CARDIOLOGY | Facility: CLINIC | Age: 60
End: 2025-06-05
Payer: COMMERCIAL

## 2025-06-05 ENCOUNTER — TELEPHONE (OUTPATIENT)
Dept: RHEUMATOLOGY | Facility: CLINIC | Age: 60
End: 2025-06-05

## 2025-06-05 NOTE — TELEPHONE ENCOUNTER
Pt would like to discuss test results of 3/4. She can be reached to discuss further at [phone]

## 2025-06-09 PROBLEM — E13.9 LATENT AUTOIMMUNE DIABETES MELLITUS IN ADULT (LADA) (MULTI): Status: RESOLVED | Noted: 2022-07-01 | Resolved: 2025-06-09

## 2025-06-10 NOTE — TELEPHONE ENCOUNTER
Called and left patient a message.  Jose Arias PA-C to review monitor and advise.  Patient is to let office know if she is having symptoms.     Monitor results:  The predominant rhythm during this holter recording is sinus rhythm with a minimum heart rate of 58 bpm, maximum heart rate 140 bpm, and average heart rate 81 bpm. No episodes of atrial fibrillation. Brief runs of SVT are present which is most likely atrial tachycardia. No episodes of high grade AV block. 1 episode of nonsustained ventricular tachycardia. PVC burden less than 1% .

## 2025-06-12 DIAGNOSIS — K21.9 GASTRO-ESOPHAGEAL REFLUX DISEASE WITHOUT ESOPHAGITIS: ICD-10-CM

## 2025-06-12 LAB — BODY SURFACE AREA: 2.25 M2

## 2025-06-12 PROCEDURE — 93244 EXT ECG>48HR<7D REV&INTERPJ: CPT | Performed by: INTERNAL MEDICINE

## 2025-06-12 RX ORDER — ONDANSETRON 4 MG/1
4 TABLET, FILM COATED ORAL 3 TIMES DAILY
Qty: 30 TABLET | Refills: 1 | Status: SHIPPED | OUTPATIENT
Start: 2025-06-12

## 2025-06-16 ENCOUNTER — APPOINTMENT (OUTPATIENT)
Dept: RHEUMATOLOGY | Facility: CLINIC | Age: 60
End: 2025-06-16
Payer: COMMERCIAL

## 2025-06-22 ASSESSMENT — ENCOUNTER SYMPTOMS
NEUROLOGICAL NEGATIVE: 1
HEMATOLOGIC/LYMPHATIC NEGATIVE: 1
GASTROINTESTINAL NEGATIVE: 1
ENDOCRINE NEGATIVE: 1
RESPIRATORY NEGATIVE: 1
MUSCULOSKELETAL NEGATIVE: 1
PSYCHIATRIC NEGATIVE: 1
CONSTITUTIONAL NEGATIVE: 1
EYES NEGATIVE: 1
ALLERGIC/IMMUNOLOGIC NEGATIVE: 1
CARDIOVASCULAR NEGATIVE: 1

## 2025-06-23 ENCOUNTER — APPOINTMENT (OUTPATIENT)
Dept: GASTROENTEROLOGY | Facility: CLINIC | Age: 60
End: 2025-06-23
Payer: COMMERCIAL

## 2025-06-23 NOTE — PROGRESS NOTES
Subjective     History of Present Illness:   Josemanuel Reinoso is a 60 y.o. female who presents to GI clinic for follow up.  Her recent capsule endoscopy revealed no evidence of any bleeding or bleeding lesions in the small bowel. EGD from 2/6/2025 showed:  Grade A esophagitis in the GE junction  Non-obstructing Schatzki ring in the GE junction  The stomach appeared normal.  The duodenum appeared normal. Performed random biopsy to rule out celiac disease.  Her colonoscopy showed :  Subcentimeter polyp in the transverse colon was removed with cold snare  Scattered diverticulosis of moderate severity in the descending colon and sigmoid colon  Small (grade 1) hemorrhoids        Review of Systems  Review of Systems   Constitutional: Negative.    HENT: Negative.     Eyes: Negative.    Respiratory: Negative.     Cardiovascular: Negative.    Gastrointestinal: Negative.    Endocrine: Negative.    Genitourinary: Negative.    Musculoskeletal: Negative.    Skin: Negative.    Allergic/Immunologic: Negative.    Neurological: Negative.    Hematological: Negative.    Psychiatric/Behavioral: Negative.         Past Medical History   has a past medical history of Asthma, DM (diabetes mellitus) (Multi), GERD (gastroesophageal reflux disease), Hyperlipidemia, Hypertension, Personal history of other diseases of the circulatory system, and Personal history of other endocrine, nutritional and metabolic disease.     Social History   reports that she has been smoking cigarettes. She has never been exposed to tobacco smoke. She has never used smokeless tobacco. She reports that she does not drink alcohol and does not use drugs.     Family History  family history is not on file.     Allergies  RX Allergies[1]    Medications  Current Outpatient Medications   Medication Instructions    acetaminophen (Tylenol) 325 mg tablet TAKE 1 TO 2 TABLETS BY MOUTH EVERY 4 HOURS AS NEEDED FOR PAIN FOR UP TO 7 DAYS    acetaminophen (TYLENOL) 650 mg, oral, Every  6 hours PRN    albuterol 90 mcg/actuation inhaler inhale 1 to 2 puffs by mouth every 4 hours Inhalation    albuterol 2.5 mg, Every 6 hours PRN    azelastine (Astelin) 137 mcg (0.1 %) nasal spray 1 spray, 2 times daily    bisacodyl (Dulcolax) 5 mg EC tablet take 1 tablet by mouth once daily if needed for constipation    Breztri Aerosphere 160-9-4.8 mcg/actuation HFA aerosol inhaler PLEASE SEE ATTACHED FOR DETAILED DIRECTIONS    capsaicin  cream (Capzasin-HP) 0.1 % cream cream 1 Application, Daily    carvedilol (COREG) 25 mg, oral, 2 times daily (morning and late afternoon)    cetirizine (ZYRTEC) 10 mg, Daily    cetirizine-pseudoephedrine (ZyrTEC-D) 5-120 mg 12 hr tablet 1 tablet, Daily RT    chlorthalidone (HYGROTON) 25 mg, oral, Daily RT    cholecalciferol (VITAMIN D3) 1,000 Units, Daily    ciclopirox (Penlac) 8 % solution 1 Application, Nightly    cromolyn (Opticrom) 4 % ophthalmic solution 1 drop, 4 times daily    cyanocobalamin (Vitamin B-12) 1,000 mcg tablet 1 tablet, Daily RT    cyclobenzaprine (Flexeril) 10 mg tablet TAKE 1 TABLET BY MOUTH EVERY 8 HOURS AS NEEDED FOR UP TO 5 DAYS    dexlansoprazole (DEXILANT) 60 mg, oral, Daily, Do not crush or chew.    dilTIAZem (Cardizem) 30 mg immediate release tablet TAKE 1 TABLET BY MOUTH 2 TIMES A DAY AS NEEDED (PALPITATIONS).    docusate sodium (COLACE) 100 mg, 2 times daily PRN    empagliflozin (Jardiance) 10 mg 1 tablet, Daily with breakfast    famotidine (PEPCID) 40 mg, oral, Nightly    fluticasone (Flonase) 50 mcg/actuation nasal spray 1 spray, 2 times daily PRN    FreeStyle Jasmyne 3 Sensor device 1 kit, As needed    furosemide (LASIX) 20 mg, oral, Daily PRN    hydroxychloroquine (PLAQUENIL) 200 mg, oral, 2 times daily    hyoscyamine 0.125 mg disintegrating tablet DISSOLVE 1 TABLET UNDER THE TONGUE EVERY 4 HOURS AS NEEDED    ipratropium-albuteroL (Duo-Neb) 0.5-2.5 mg/3 mL nebulizer solution 3 mL, Every 6 hours PRN    lidocaine (Lidoderm) 5 % patch 1 patch, Daily PRN     melatonin 5 mg, Daily PRN    meloxicam (MOBIC) 15 mg, Daily    methocarbamol (Robaxin) 500 mg tablet     methocarbamol (ROBAXIN) 500 mg, oral, 2 times daily    metoclopramide (REGLAN) 5 mg, oral, 4 times daily    miconazole (Micotin) 2 % cream 1 Application, As needed    montelukast (SINGULAIR) 10 mg, Daily PRN    naloxone (Narcan) 4 mg/0.1 mL nasal spray 1 spray, As needed    ondansetron (ZOFRAN) 4 mg, oral, 3 times daily    oxyBUTYnin XL (DITROPAN-XL) 10 mg, Daily    oxyCODONE-acetaminophen (Percocet) 5-325 mg tablet     Pulmicort Flexhaler 90 mcg/actuation inhaler 1-2 puffs, 2 times daily RT    rosuvastatin (Crestor) 20 mg tablet Take 1 tablet (20 mg) by mouth once daily.       Objective   There were no vitals taken for this visit.  Physical Exam  Constitutional:       Appearance: Normal appearance.   HENT:      Head: Normocephalic and atraumatic.      Right Ear: Tympanic membrane normal.      Left Ear: Tympanic membrane normal.      Nose: Nose normal.      Mouth/Throat:      Mouth: Mucous membranes are moist.   Eyes:      Extraocular Movements: Extraocular movements intact.      Pupils: Pupils are equal, round, and reactive to light.   Cardiovascular:      Rate and Rhythm: Normal rate and regular rhythm.      Pulses: Normal pulses.   Pulmonary:      Effort: Pulmonary effort is normal.      Breath sounds: Normal breath sounds.   Abdominal:      General: Abdomen is flat. Bowel sounds are normal. There is no distension.      Palpations: Abdomen is soft.      Tenderness: There is no abdominal tenderness.   Musculoskeletal:         General: Normal range of motion.      Cervical back: Normal range of motion.   Skin:     General: Skin is warm.   Neurological:      General: No focal deficit present.      Mental Status: She is alert and oriented to person, place, and time.   Psychiatric:         Mood and Affect: Mood normal.       Assessment/Plan   Josemanuel Reinoso is a 60 y.o. female who presents to GI clinic for  ***.            By signing my name below, Kristy GRIMES Scribe attest that this documentation has been prepared under the direction and in the presence of Hesham Rice MD          [1]   Allergies  Allergen Reactions    Cephalexin GI Upset and Swelling    Azithromycin GI Upset     c/o severe stomach burning    Doxycycline Diarrhea and Nausea/vomiting    Erythromycin GI Upset and Nausea/vomiting    Fluticasone Propion-Salmeterol Other     Sore throat / thrush     Ibuprofen GI Upset    Lamotrigine Swelling     Had bilateral swelling of feet and lower legs. The swelling subsided and is now gone 2 days after she stopped Lamictal    Metformin GI Upset    Penicillins Hives and Rash    Pregabalin Hives    Estradiol Itching and Rash    Nickel Rash

## 2025-06-24 ENCOUNTER — APPOINTMENT (OUTPATIENT)
Dept: GASTROENTEROLOGY | Facility: CLINIC | Age: 60
End: 2025-06-24
Payer: COMMERCIAL

## 2025-06-24 VITALS
HEIGHT: 62 IN | DIASTOLIC BLOOD PRESSURE: 82 MMHG | WEIGHT: 258.8 LBS | HEART RATE: 90 BPM | SYSTOLIC BLOOD PRESSURE: 130 MMHG | BODY MASS INDEX: 47.62 KG/M2

## 2025-06-24 DIAGNOSIS — R10.13 EPIGASTRIC PAIN: Primary | ICD-10-CM

## 2025-06-24 DIAGNOSIS — D50.9 IRON DEFICIENCY ANEMIA, UNSPECIFIED IRON DEFICIENCY ANEMIA TYPE: ICD-10-CM

## 2025-06-24 DIAGNOSIS — K31.84 GASTROPARESIS: Primary | ICD-10-CM

## 2025-06-24 PROCEDURE — 99214 OFFICE O/P EST MOD 30 MIN: CPT | Performed by: INTERNAL MEDICINE

## 2025-06-24 PROCEDURE — 3079F DIAST BP 80-89 MM HG: CPT | Performed by: INTERNAL MEDICINE

## 2025-06-24 PROCEDURE — 3075F SYST BP GE 130 - 139MM HG: CPT | Performed by: INTERNAL MEDICINE

## 2025-06-24 PROCEDURE — 3008F BODY MASS INDEX DOCD: CPT | Performed by: INTERNAL MEDICINE

## 2025-06-24 RX ORDER — DEXLANSOPRAZOLE 60 MG/1
60 CAPSULE, DELAYED RELEASE ORAL DAILY
Qty: 90 CAPSULE | Refills: 3 | Status: SHIPPED | OUTPATIENT
Start: 2025-06-24 | End: 2026-06-24

## 2025-06-24 ASSESSMENT — ENCOUNTER SYMPTOMS
HEMATOLOGIC/LYMPHATIC NEGATIVE: 1
RESPIRATORY NEGATIVE: 1
MUSCULOSKELETAL NEGATIVE: 1
CONSTITUTIONAL NEGATIVE: 1
NEUROLOGICAL NEGATIVE: 1
EYES NEGATIVE: 1
GASTROINTESTINAL NEGATIVE: 1
CARDIOVASCULAR NEGATIVE: 1
ENDOCRINE NEGATIVE: 1

## 2025-06-24 NOTE — PROGRESS NOTES
Subjective     History of Present Illness:   Josemanuel Reinoso is a 60 y.o. female who presents to GI clinic for follow-up. I last saw her on 3/11/2025, at which time she had epigastric pain with associated nausea and vomiting. I had given her a rx of Zofran to be taken as needed. Her recent iron studies were normal and so I recommended that she stop taking the iron.  In regards to her gallbladder sludge, I had referred her to Dr Lassiter (surgery) for possible consideration of a HIDA scan as she was  interested in getting a possible cholecystectomy.  She had repeat ultrasound which was normal.    She came back to see my nurse practitioner for follow-up with worsening symptoms of nausea and vomiting and she had a gastric emptying test ordered and was started on Reglan.  She never got the gastric emptying test done.    Today, she says she has been feeling better. She has been taking the Zofran every day once daily. This has been helping with her nausea. . She is also taking the Reglan.    Review of Systems  Review of Systems   Constitutional: Negative.    Eyes: Negative.    Respiratory: Negative.     Cardiovascular: Negative.    Gastrointestinal: Negative.    Endocrine: Negative.    Musculoskeletal: Negative.    Skin: Negative.    Neurological: Negative.    Hematological: Negative.      Past Medical History   has a past medical history of Asthma, DM (diabetes mellitus) (Multi), GERD (gastroesophageal reflux disease), Hyperlipidemia, Hypertension, Personal history of other diseases of the circulatory system, and Personal history of other endocrine, nutritional and metabolic disease.     Social History   reports that she has been smoking cigarettes. She has never been exposed to tobacco smoke. She has never used smokeless tobacco. She reports that she does not drink alcohol and does not use drugs.     Family History  family history is not on file.     Allergies  Allergies   Allergen Reactions    Cephalexin GI Upset and  Swelling    Azithromycin GI Upset     c/o severe stomach burning    Doxycycline Diarrhea and Nausea/vomiting    Erythromycin GI Upset and Nausea/vomiting    Fluticasone Propion-Salmeterol Other     Sore throat / thrush     Ibuprofen GI Upset    Lamotrigine Swelling     Had bilateral swelling of feet and lower legs. The swelling subsided and is now gone 2 days after she stopped Lamictal    Metformin GI Upset    Penicillins Hives and Rash    Pregabalin Hives    Estradiol Itching and Rash    Nickel Rash       Medications  Current Outpatient Medications   Medication Instructions    acetaminophen (Tylenol) 325 mg tablet TAKE 1 TO 2 TABLETS BY MOUTH EVERY 4 HOURS AS NEEDED FOR PAIN FOR UP TO 7 DAYS    acetaminophen (TYLENOL) 650 mg, oral, Every 6 hours PRN    albuterol 90 mcg/actuation inhaler inhale 1 to 2 puffs by mouth every 4 hours Inhalation    albuterol 2.5 mg, Every 6 hours PRN    azelastine (Astelin) 137 mcg (0.1 %) nasal spray 1 spray, 2 times daily    bisacodyl (Dulcolax) 5 mg EC tablet take 1 tablet by mouth once daily if needed for constipation    Breztri Aerosphere 160-9-4.8 mcg/actuation HFA aerosol inhaler PLEASE SEE ATTACHED FOR DETAILED DIRECTIONS    capsaicin  cream (Capzasin-HP) 0.1 % cream cream 1 Application, Daily    carvedilol (COREG) 25 mg, oral, 2 times daily (morning and late afternoon)    cetirizine (ZYRTEC) 10 mg, Daily    chlorthalidone (HYGROTON) 25 mg, oral, Daily RT    cholecalciferol (VITAMIN D3) 1,000 Units, Daily    ciclopirox (Penlac) 8 % solution 1 Application, Nightly    cromolyn (Opticrom) 4 % ophthalmic solution 1 drop, 4 times daily    cyanocobalamin (Vitamin B-12) 1,000 mcg tablet 1 tablet, Daily RT    cyclobenzaprine (Flexeril) 10 mg tablet TAKE 1 TABLET BY MOUTH EVERY 8 HOURS AS NEEDED FOR UP TO 5 DAYS    dexlansoprazole (DEXILANT) 60 mg, oral, Daily, Do not crush or chew.    dilTIAZem (Cardizem) 30 mg immediate release tablet TAKE 1 TABLET BY MOUTH 2 TIMES A DAY AS NEEDED  "(PALPITATIONS).    docusate sodium (COLACE) 100 mg, 2 times daily PRN    empagliflozin (Jardiance) 10 mg 1 tablet, Daily with breakfast    famotidine (PEPCID) 40 mg, oral, Nightly    fluticasone (Flonase) 50 mcg/actuation nasal spray 1 spray, 2 times daily PRN    FreeStyle Jasmyne 3 Sensor device 1 kit, As needed    furosemide (LASIX) 20 mg, oral, Daily PRN    hydroxychloroquine (PLAQUENIL) 200 mg, oral, 2 times daily    hyoscyamine 0.125 mg disintegrating tablet DISSOLVE 1 TABLET UNDER THE TONGUE EVERY 4 HOURS AS NEEDED    ipratropium-albuteroL (Duo-Neb) 0.5-2.5 mg/3 mL nebulizer solution 3 mL, Every 6 hours PRN    lidocaine (Lidoderm) 5 % patch 1 patch, Daily PRN    melatonin 5 mg, Daily PRN    meloxicam (MOBIC) 15 mg, Daily    methocarbamol (Robaxin) 500 mg tablet     methocarbamol (ROBAXIN) 500 mg, oral, 2 times daily    metoclopramide (REGLAN) 5 mg, oral, 4 times daily    miconazole (Micotin) 2 % cream 1 Application, As needed    montelukast (SINGULAIR) 10 mg, Daily PRN    naloxone (Narcan) 4 mg/0.1 mL nasal spray 1 spray, As needed    ondansetron (ZOFRAN) 4 mg, oral, 3 times daily    oxyBUTYnin XL (DITROPAN-XL) 10 mg, Daily    oxyCODONE-acetaminophen (Percocet) 5-325 mg tablet     Pulmicort Flexhaler 90 mcg/actuation inhaler 1-2 puffs, 2 times daily RT    rosuvastatin (Crestor) 20 mg tablet Take 1 tablet (20 mg) by mouth once daily.       Objective   /82 (BP Location: Right arm, Patient Position: Sitting)   Pulse 90   Ht 1.575 m (5' 2\")   Wt 117 kg (258 lb 12.8 oz)   BMI 47.34 kg/m²   Physical Exam  Constitutional:       Appearance: Normal appearance.   HENT:      Head: Normocephalic and atraumatic.      Right Ear: Tympanic membrane normal.      Left Ear: Tympanic membrane normal.      Nose: Nose normal.      Mouth/Throat:      Mouth: Mucous membranes are moist.   Eyes:      Extraocular Movements: Extraocular movements intact.      Pupils: Pupils are equal, round, and reactive to light. "   Cardiovascular:      Rate and Rhythm: Normal rate and regular rhythm.      Pulses: Normal pulses.   Pulmonary:      Effort: Pulmonary effort is normal.      Breath sounds: Normal breath sounds.   Abdominal:      General: Abdomen is flat. Bowel sounds are normal. There is no distension.      Palpations: Abdomen is soft.      Tenderness: There is no abdominal tenderness.   Musculoskeletal:         General: Normal range of motion.      Cervical back: Normal range of motion.   Skin:     General: Skin is warm.   Neurological:      General: No focal deficit present.      Mental Status: She is alert and oriented to person, place, and time.   Psychiatric:         Mood and Affect: Mood normal.         Assessment/Plan   Josemanuel Reinoso is a 60 y.o. female who presents to GI clinic for follow up. She has multiple GI complaints that appear to be chronic for the most part.    #Nausea/Vomiting  ? Gastroparesis   Prior gastric emptying test was normal and she did not have a repeat one today.  She is however doing well.  She always has several complaints and is very upset but today she seems to be in a good mood and she did not have significant complaints and says that things are holding up.     She has been taking Zofran and Reglan with good control of her symptoms. I did tell her to try and cut down on the Reglan due to the black elena warning and chronic use of it can have neurological symptoms.  Especially that we do not have strong evidence that she has gastroparesis    She is also on chronic narcotics for pain management which can also contribute to her symptoms of gastroparesis.     She will also go back on the Dexilant.       By signing my name below, IKristy Scribe attest that this documentation has been prepared under the direction and in the presence of Hesham Rice MD

## 2025-06-25 ENCOUNTER — APPOINTMENT (OUTPATIENT)
Dept: RHEUMATOLOGY | Facility: CLINIC | Age: 60
End: 2025-06-25
Payer: COMMERCIAL

## 2025-06-25 VITALS
SYSTOLIC BLOOD PRESSURE: 134 MMHG | WEIGHT: 256 LBS | HEART RATE: 80 BPM | DIASTOLIC BLOOD PRESSURE: 79 MMHG | HEIGHT: 62 IN | BODY MASS INDEX: 47.11 KG/M2

## 2025-06-25 DIAGNOSIS — D50.8 OTHER IRON DEFICIENCY ANEMIA: ICD-10-CM

## 2025-06-25 DIAGNOSIS — M19.011 ARTHRITIS OF RIGHT SHOULDER REGION: Primary | ICD-10-CM

## 2025-06-25 DIAGNOSIS — M32.9 SYSTEMIC LUPUS ERYTHEMATOSUS, UNSPECIFIED SLE TYPE, UNSPECIFIED ORGAN INVOLVEMENT STATUS (MULTI): Primary | ICD-10-CM

## 2025-06-25 PROCEDURE — 3075F SYST BP GE 130 - 139MM HG: CPT | Performed by: INTERNAL MEDICINE

## 2025-06-25 PROCEDURE — 99214 OFFICE O/P EST MOD 30 MIN: CPT | Performed by: INTERNAL MEDICINE

## 2025-06-25 PROCEDURE — 96372 THER/PROPH/DIAG INJ SC/IM: CPT | Performed by: INTERNAL MEDICINE

## 2025-06-25 PROCEDURE — 3008F BODY MASS INDEX DOCD: CPT | Performed by: INTERNAL MEDICINE

## 2025-06-25 PROCEDURE — 3078F DIAST BP <80 MM HG: CPT | Performed by: INTERNAL MEDICINE

## 2025-06-25 RX ORDER — DULOXETIN HYDROCHLORIDE 20 MG/1
20 CAPSULE, DELAYED RELEASE ORAL DAILY
Qty: 30 CAPSULE | Refills: 11 | Status: SHIPPED | OUTPATIENT
Start: 2025-06-25 | End: 2025-06-25 | Stop reason: WASHOUT

## 2025-06-25 RX ORDER — TRIAMCINOLONE ACETONIDE 40 MG/ML
40 INJECTION, SUSPENSION INTRA-ARTICULAR; INTRAMUSCULAR ONCE
Status: COMPLETED | OUTPATIENT
Start: 2025-06-25 | End: 2025-06-25

## 2025-06-25 RX ADMIN — TRIAMCINOLONE ACETONIDE 40 MG: 40 INJECTION, SUSPENSION INTRA-ARTICULAR; INTRAMUSCULAR at 10:59

## 2025-06-25 ASSESSMENT — PAIN SCALES - GENERAL: PAINLEVEL_OUTOF10: 9

## 2025-06-25 NOTE — PROGRESS NOTES
Per Dr. Ward:   Apply for Benlysta 200 mg subcutaneous weekly for lupus. Pt wants to do self injections instead of infusions.     Discontinuing Saphenlo orders and placed new referral for subcutaneous benlysta to [facility] for PA/PAP/Processing.

## 2025-06-26 RX ORDER — BELIMUMAB 200 MG/ML
200 SOLUTION SUBCUTANEOUS
Qty: 4 ML | Refills: 11 | Status: SHIPPED | OUTPATIENT
Start: 2025-06-26

## 2025-06-26 NOTE — PROGRESS NOTES
She presents with complaints of generalized musculoskeletal pain that is not controlled with Percocet.  She was unable to tolerate Cymbalta in the past due to abdominal burning sensation when she took it.  She notes swelling in the lower extremities that improved with taking diuretic therapy but notes that the swelling recurs in the mornings as soon as she stands.  She has some shortness of breath.  She had improvement in the lower back pain following a lumbar epidural nerve block (5/19/2025)    The lungs are clear to auscultation.  The heart has a regular in rhythm with normal heart sounds.  The abdomen is benign.  The extremities 2+ pitting edema at the right ankle and 1+ pitting edema at the left ankle.  The musculoskeletal examination shows some mild diffuse tenderness to light touch over the lower back, upper and lower extremities.  There is preserved muscle strength in the upper and lower extremities.    Laboratory (5/14/2025) RUSSELL 1: 320, RNP 1.0, C3 185, C4 55, vitamin B12 257, iron 45, TIBC 363, saturation 12%, ferritin 32, urinalysis normal.    She has history of type 2 diabetes mellitus, hyperlipidemia, hypertension, congestive heart failure, post lumbar laminectomy syndrome, bipolar disorder, pulmonary hypertension, obstructive sleep apnea, antiphospholipid syndrome, fibromyalgia, gout, mixed connective tissue disease.  She has history of iron deficiency anemia.    She is to continue with plans for follow-up with pain management.  She is reluctant to retry Cymbalta for assistance with pain management.  She is referred to hematology regarding the anemia.  She is to do a low impact exercise as tolerated.  She is to continue hydroxychloroquine 200 mg twice per day.  She is to return for follow-up evaluation in 6 months.

## 2025-06-30 DIAGNOSIS — R11.2 NAUSEA AND VOMITING, UNSPECIFIED VOMITING TYPE: ICD-10-CM

## 2025-06-30 RX ORDER — METOCLOPRAMIDE 5 MG/1
5 TABLET ORAL 4 TIMES DAILY
Qty: 120 TABLET | Refills: 0 | Status: SHIPPED | OUTPATIENT
Start: 2025-06-30 | End: 2025-07-30

## 2025-07-08 ENCOUNTER — TELEMEDICINE (OUTPATIENT)
Dept: CARDIOLOGY | Facility: CLINIC | Age: 60
End: 2025-07-08
Payer: COMMERCIAL

## 2025-07-08 DIAGNOSIS — E78.5 HYPERLIPIDEMIA, UNSPECIFIED HYPERLIPIDEMIA TYPE: Primary | ICD-10-CM

## 2025-07-08 DIAGNOSIS — R07.89 CHEST DISCOMFORT: ICD-10-CM

## 2025-07-08 DIAGNOSIS — I50.32 HYPERTENSIVE HEART DISEASE WITH CHRONIC DIASTOLIC CONGESTIVE HEART FAILURE: ICD-10-CM

## 2025-07-08 DIAGNOSIS — I49.3 PVC (PREMATURE VENTRICULAR CONTRACTION): ICD-10-CM

## 2025-07-08 DIAGNOSIS — I47.10 NONSUSTAINED PAROXYSMAL SUPRAVENTRICULAR TACHYCARDIA: ICD-10-CM

## 2025-07-08 DIAGNOSIS — I10 ESSENTIAL HYPERTENSION: Chronic | ICD-10-CM

## 2025-07-08 DIAGNOSIS — I50.9 CHRONIC HEART FAILURE, UNSPECIFIED HEART FAILURE TYPE: ICD-10-CM

## 2025-07-08 DIAGNOSIS — I11.0 HYPERTENSIVE HEART DISEASE WITH CHRONIC DIASTOLIC CONGESTIVE HEART FAILURE: ICD-10-CM

## 2025-07-08 DIAGNOSIS — R00.2 PALPITATIONS: ICD-10-CM

## 2025-07-08 PROBLEM — F31.9 BIPOLAR AFFECTIVE DISORDER, CURRENTLY ACTIVE (MULTI): Status: RESOLVED | Noted: 2020-08-15 | Resolved: 2025-07-08

## 2025-07-08 PROCEDURE — 99214 OFFICE O/P EST MOD 30 MIN: CPT | Performed by: STUDENT IN AN ORGANIZED HEALTH CARE EDUCATION/TRAINING PROGRAM

## 2025-07-08 RX ORDER — DILTIAZEM HYDROCHLORIDE 30 MG/1
30 TABLET, FILM COATED ORAL DAILY PRN
Qty: 60 TABLET | Refills: 6 | Status: SHIPPED | OUTPATIENT
Start: 2025-07-08

## 2025-07-08 RX ORDER — CARVEDILOL 25 MG/1
25 TABLET ORAL
Qty: 60 TABLET | Refills: 11 | Status: SHIPPED | OUTPATIENT
Start: 2025-07-08 | End: 2026-07-08

## 2025-07-08 RX ORDER — FUROSEMIDE 20 MG/1
20 TABLET ORAL DAILY PRN
Qty: 30 TABLET | Refills: 11 | Status: SHIPPED | OUTPATIENT
Start: 2025-07-08 | End: 2026-07-08

## 2025-07-08 NOTE — PROGRESS NOTES
Chief Complaint:   No chief complaint on file.     History Of Present Illness:    Josemanuel Reinoso is a 60 y.o. female who presents for virtual visit.  Recently seen by Neo Caldwell and was given a Holter monitor which she wore for 7 days.  Holter monitor revealed presence of short episodes of SVT lasting no longer than 20-30 beats 1 brief episode NSVT lasting 6 beats and PVC burden of 1%.  Patient does complain of shortness of breath  And fatigue.  Her symptoms feel similar to when her iron levels are low.  She is apparently scheduled to have iron infusion which is managed through her rheumatologist.  She endorsed compliance with current medical therapy.  No active chest pain.  She does continue to smoke.     Last Recorded Vitals:  There were no vitals filed for this visit.    Review of Systems  ROS      Allergies:  Cephalexin, Azithromycin, Doxycycline, Erythromycin, Fluticasone propion-salmeterol, Ibuprofen, Lamotrigine, Metformin, Penicillins, Pregabalin, Estradiol, and Nickel    Outpatient Medications:  Current Outpatient Medications   Medication Instructions    acetaminophen (TYLENOL) 650 mg, oral, Every 6 hours PRN    albuterol 90 mcg/actuation inhaler inhale 1 to 2 puffs by mouth every 4 hours Inhalation    albuterol 2.5 mg, Every 6 hours PRN    azelastine (Astelin) 137 mcg (0.1 %) nasal spray 1 spray, 2 times daily    Benlysta 200 mg, subcutaneous, Every 7 days    bisacodyl (Dulcolax) 5 mg EC tablet take 1 tablet by mouth once daily if needed for constipation    Breztri Aerosphere 160-9-4.8 mcg/actuation HFA aerosol inhaler PLEASE SEE ATTACHED FOR DETAILED DIRECTIONS    capsaicin  cream (Capzasin-HP) 0.1 % cream cream 1 Application, Daily    carvedilol (COREG) 25 mg, oral, 2 times daily (morning and late afternoon)    cetirizine (ZYRTEC) 10 mg, Daily    chlorthalidone (HYGROTON) 25 mg, oral, Daily RT    cholecalciferol (VITAMIN D3) 1,000 Units, Daily    ciclopirox (Penlac) 8 % solution 1 Application,  Nightly    cromolyn (Opticrom) 4 % ophthalmic solution 1 drop, 4 times daily    cyanocobalamin (Vitamin B-12) 1,000 mcg tablet 1 tablet, Daily RT    cyclobenzaprine (Flexeril) 10 mg tablet TAKE 1 TABLET BY MOUTH EVERY 8 HOURS AS NEEDED FOR UP TO 5 DAYS    dexlansoprazole (DEXILANT) 60 mg, oral, Daily, Do not crush or chew.    dilTIAZem (CARDIZEM) 30 mg, oral, Daily PRN    docusate sodium (COLACE) 100 mg, 2 times daily PRN    famotidine (PEPCID) 40 mg, oral, Nightly    fluticasone (Flonase) 50 mcg/actuation nasal spray 1 spray, 2 times daily PRN    FreeStyle Jasmyne 3 Sensor device 1 kit, As needed    furosemide (LASIX) 20 mg, oral, Daily PRN    hyoscyamine 0.125 mg disintegrating tablet DISSOLVE 1 TABLET UNDER THE TONGUE EVERY 4 HOURS AS NEEDED    ipratropium-albuteroL (Duo-Neb) 0.5-2.5 mg/3 mL nebulizer solution 3 mL, Every 6 hours PRN    lidocaine (Lidoderm) 5 % patch 1 patch, Daily PRN    melatonin 5 mg, Daily PRN    meloxicam (MOBIC) 15 mg, Daily    methocarbamol (ROBAXIN) 500 mg, oral, 2 times daily    metoclopramide (REGLAN) 5 mg, oral, 4 times daily    montelukast (SINGULAIR) 10 mg, Daily PRN    naloxone (Narcan) 4 mg/0.1 mL nasal spray 1 spray, As needed    ondansetron (ZOFRAN) 4 mg, oral, 3 times daily    oxyBUTYnin XL (DITROPAN-XL) 10 mg, Daily    oxyCODONE-acetaminophen (Percocet) 5-325 mg tablet     Pulmicort Flexhaler 90 mcg/actuation inhaler 1-2 puffs, 2 times daily RT    rosuvastatin (Crestor) 20 mg tablet Take 1 tablet (20 mg) by mouth once daily.       Physical Exam:  Virtual visit     Last Labs:  CBC -  Lab Results   Component Value Date    WBC 7.5 04/26/2025    HGB 11.6 (L) 04/26/2025    HCT 39.2 04/26/2025    MCV 81 04/26/2025     04/26/2025       CMP -  Lab Results   Component Value Date    CALCIUM 9.3 04/26/2025    PROT 7.3 04/26/2025    ALBUMIN 4.2 04/26/2025    AST 14 04/26/2025    ALT 6 (L) 04/26/2025    ALKPHOS 109 04/26/2025    BILITOT 0.3 04/26/2025       LIPID PANEL -   Lab Results    Component Value Date    CHOL 208 (H) 04/29/2024    TRIG 154 (H) 04/29/2024    HDL 44.7 04/29/2024    CHHDL 4.7 04/29/2024    VLDL 31 04/29/2024    NHDL 163 (H) 04/29/2024       RENAL FUNCTION PANEL -   Lab Results   Component Value Date    GLUCOSE 93 04/26/2025     04/26/2025    K 4.2 04/26/2025     04/26/2025    CO2 26 04/26/2025    ANIONGAP 11 04/26/2025    BUN 8 04/26/2025    CREATININE 0.80 04/26/2025    CALCIUM 9.3 04/26/2025    ALBUMIN 4.2 04/26/2025        Lab Results   Component Value Date    BNP 92 06/05/2024    HGBA1C 6.8 (H) 03/14/2024       Last Cardiology Tests:  ECG:  Encounter Date: 04/26/25   ECG 12 lead   Result Value    Ventricular Rate 80    Atrial Rate 80    IL Interval 168    QRS Duration 92    QT Interval 388    QTC Calculation(Bazett) 447    P Axis 33    R Axis -1    T Axis 24    QRS Count 13    Q Onset 218    P Onset 134    P Offset 195    T Offset 412    QTC Fredericia 427    Narrative    Normal sinus rhythm  Moderate voltage criteria for LVH, may be normal variant ( R in aVL , [place] product )  Nonspecific T wave abnormality  Abnormal ECG  When compared with ECG of 05-JUN-2024 14:03,  PREVIOUS ECG IS PRESENT  See ED provider note for full interpretation and clinical correlation  Confirmed by Joan Iverson ([id number]) on 5/2/2025 4:45:17 PM        Echo:  No results found for this or any previous visit from the past 1095 days.       Ejection Fractions:  EF   Date/Time Value Ref Range Status   03/15/2024 01:38 PM 49 %        Assessment and Plan    1.  PVCs/paroxysmal SVT: Confirmed with most recent Holter monitor.  Echo showed normal ejection fraction with LVH.  Baseline EKG reviewed.  We are switching metoprolol to carvedilol to improve blood pressure management and additional beta-blockade.  Patient is off of amitriptyline.  She uses Cardizem as needed for breakthrough ectopic events.    Recent Holter monitor test confirmed recurrence of paroxysmal SVT with a burden of PVCs of less  than 1%.  Continue Cardizem 30 mg short release immediate release for breakthrough events.  Continue carvedilol 25 mg twice a day.     2.  Chest pain: Atypical: Pain is different in association with food or lupus flare.  She has had previous stress test 1 year ago although this was a regadenoson nuclear stress test.  Her calcium score is 0 which is limited in the context of chronic autoimmune disease.  Risk factors include hypertension, hyperlipidemia and type 2 diabetes.  Our plan is to check fasting lipid panel.  In light of ongoing abdominal discomfort and likely need for gallbladder intervention.  Coronary CTA did not show evidence of obstructive CAD.  She also was treated with a short course of colchicine which had not no impact on her symptoms.  Suspect symptoms are related to PVCs as described above.     3.  HFpEF: Chronic.  Volume status appears to be relatively controlled with Jardiance 10 mg daily and chlorthalidone.  Volume status increase in the setting of dietary indiscretion.  Advised patient to use Lasix 20 mg to take as needed for weight gain or orthopnea symptoms.       4.  COPD/asthma: Management as per pulmonology.  Will try to limit exposure to beta-blockers in this context.     5.  Hypertension: LVH appreciated with echocardiogram.  Better controlled with carvedilol 25 mg twice a day.     6.  Microcytic anemia:  Patient is complaining of fatigue and tiredness.  Advise adherence to ferrous sulfate 325 mg daily.  She may have iron infusion scheduled through her rheumatologist..     7.  Tobacco consumption: Precontemplative.  Advised smoking cessation.     8.  Hyperlipidemia: LDL is 133.  She is on rosuvastatin 20 mg daily.  Repeat fasting lipid panel ordered     Follow-up in 6 months  Take oral Lasix 20 mg as needed  Check fasting lipid panel     (This note was generated with voice recognition software and may contain errors including spelling, grammar, syntax and missed recognition of what was  dictated, of which may not have been fully corrected)        Osito Browning MD PhD

## 2025-07-09 DIAGNOSIS — D50.8 OTHER IRON DEFICIENCY ANEMIA: Primary | ICD-10-CM

## 2025-07-09 RX ORDER — FERROUS SULFATE 325(65) MG
TABLET ORAL
Qty: 30 TABLET | Refills: 3 | Status: SHIPPED | OUTPATIENT
Start: 2025-07-09

## 2025-07-21 ENCOUNTER — APPOINTMENT (OUTPATIENT)
Dept: HEMATOLOGY/ONCOLOGY | Facility: CLINIC | Age: 60
End: 2025-07-21
Payer: COMMERCIAL

## 2025-07-21 NOTE — PROGRESS NOTES
Patient ID: Josemanuel Reinoso is a 60 y.o. female.  Referring Physician: Miguel Ward MD  [address] [facility] [address]  [place] [address]  Primary Care Provider: Osito Caba MD  Visit Type: Initial Visit    Location: [facility]  Diagnosis/Reason: Anemia    HPI:  Josemanuel Reinoso is a 60 y.o. female referred for consultation of anemia    NOTE:  7/21/25 - Medical hx significant for: Antiphospholipid syndrome, DM-2, PVC, heart failure, palpitations, PVC's, pulmonary HTN, GERD, irritable bowel syndrome, chronic constipation, liver lesion, SLE, Gout, OA, COPD, IDRIS, Nicotine use disorder    Per review of records:  Predominantly NC/HC anemia since at least 1/14/19 w/ Hb range of 8.0-13.7 since then  RBC counts & Hcts predominantly elevated or WNL, RDW's elevated persistently    Previous Hematological Background:  Hx of hematological disorders: {BDHEMEHX3:79687}  Hx of blood transfusions: {BDHEMEHX4:59419}  Hx of iron supplementation: {BDHEMEHX:39622}  Hx of B12 supplementation: {BDHEMEHX2:51895}  Hx of folate supplementation: {BDHEMEHX2:47113}    Relevant medications:  Benlysta 200mg every 7 days, Pepcid 40mg QHS, FeSO4 QD, B12 1000mcg QD,     Today they c/o ***    Patient denies weight loss, abnormal bruising and bleeding, hematuria, blood in stool, dark/black stools, epistaxis, oral/gingival bleeding, lymphadenopathy, recurrent infections, recurrent fevers, night sweats, early satiety, abdominal pain, bone pain, chest pain, palpitations, SOB, GARCIA, fatigue, dizziness, lightheadedness, PICA.    PMHx:  Active Ambulatory Problems     Diagnosis Date Noted    Allergic rhinitis 08/23/2023    Antiphospholipid syndrome (Multi) 12/22/2022    Anxiety disorder 10/21/2021    Asthma 09/22/2023    Exacerbation of asthma (HHS-HCC) 09/22/2023    Hypoxia 09/22/2023    Atypical chest pain 09/22/2023    Calcium pyrophosphate deposition disease 07/01/2022    Cauda equina syndrome (Multi) 10/18/2022    Cervical spondylosis without  myelopathy 10/18/2022    Chest discomfort 09/22/2023    Chronic obstructive pulmonary disease (Multi) 07/01/2022    Complication of surgical procedure 07/01/2022    Cough 01/06/2022    Degeneration of intervertebral disc of lumbar region 07/01/2022    Discoid lupus erythematosus 11/27/2021    Disorder of connective tissue (Multi) 10/18/2022    Disorder of sacrum 05/16/2007    Dyspepsia 05/12/2021    Dyspnea 09/22/2023    Elevated antinuclear antibody (RUSSELL) level 10/18/2022    Epigastric pain 09/22/2023    Essential hypertension 02/27/2014    Gastroenteritis 05/12/2021    Gastroesophageal reflux disease 04/24/2007    Gastroparesis due to DM (Multi) 07/01/2022    Gastroparesis 09/22/2023    Gout 07/01/2022    Heart failure 06/09/2023    Hemorrhoids 04/03/2023    History of bilateral knee arthroplasty 02/17/2021    Hyperlipidemia 09/22/2023    Hypertensive heart disease 06/09/2023    Hypertrophy of nasal turbinates 08/23/2023    Hypokalemia 05/12/2021    Inflammation of right sacroiliac joint 09/22/2023    Inflammatory arthritis 10/18/2022    Inflammatory polyarthritis (Multi) 10/18/2022    Chronic constipation 08/10/2023    Irritable bowel syndrome 09/14/2022    Liver lesion 09/25/2013    Lumbar radiculitis 05/19/2023    Lumbar radiculopathy 06/17/2016    Lymphedema of both lower extremities 07/08/2022    Mechanical complication of internal joint prosthesis 08/10/2023    Postoperative stiffness of total knee replacement 08/10/2023    Moderate persistent asthma without complication ([facility]-HCC) 01/10/2022    Muscle weakness 07/01/2022    Neurogenic bladder 08/21/2012    Morbid obesity (Multi) 10/09/2018    Obstructive sleep apnea syndrome 07/01/2022    Opiate dependence, continuous (Multi) 09/13/2013    Osteoarthritis of right knee 11/27/2021    Fibromyalgia 09/03/2009    Chronic pain 09/22/2023    Abdominal pain 09/22/2023    Backache 09/22/2023    Coccydynia 10/18/2022    Dislocation of prosthetic joint 10/18/2022     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    Joint pain, knee 09/22/2023    Low back pain 05/16/2007    Lumbar spine pain 12/05/2022    Pain due to knee joint prosthesis 12/22/2022    Pain in right knee 01/05/2018    Presence of left artificial hip joint 11/27/2021    Presence of right artificial knee joint 09/22/2023    Palpitations 10/22/2021    Pneumonia 09/22/2023    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    Postlaminectomy syndrome of lumbar region 06/17/2016    Primary localized osteoarthritis of pelvic region and thigh 10/18/2022    Pulmonary hypertension (Multi) 07/01/2022    Restless legs syndrome 12/18/2019    Solitary pulmonary nodule 05/03/2022    Spondylosis without myelopathy or radiculopathy, lumbosacral region 09/22/2023    History of lumbar laminectomy 10/18/2022    Status post revision of total replacement of right knee 01/27/2021    Mixed collagen vascular disease (Multi) 12/22/2022    Systemic lupus erythematosus (Multi) 05/03/2022    Nicotine use disorder 10/12/2018    Type 2 diabetes mellitus 06/30/2017    Vitamin D deficiency 10/18/2022    Acute bilateral thoracic back pain 01/23/2024    Acute pain of left shoulder 01/23/2024    Acute pain of right shoulder 01/23/2024    Candidiasis 02/07/2024    PVC (premature ventricular contraction) 02/26/2024    Benign neoplasm of transverse colon 02/26/2024    Chronic pericarditis ([facility]-HCC) 03/21/2024    Xerosis of skin 05/01/2024    Posterior tibial tendon dysfunction (PTTD) of both lower extremities 05/01/2024    Pain in toes of both feet 05/01/2024    Flat foot 05/01/2024    Discoloration of nail 05/01/2024    Dermatophytosis of nail 05/01/2024    Degenerative joint disease of both ankles and feet 05/01/2024    Diabetic peripheral neuropathy (Multi) 06/13/2024    Nonsustained paroxysmal supraventricular tachycardia 06/13/2024    Preop cardiovascular exam 06/13/2024    Iron deficiency anemia 01/24/2025    Generalized abdominal pain 03/12/2025    Gallbladder  sludge 2025     Resolved Ambulatory Problems     Diagnosis Date Noted    Bipolar affective disorder, currently active (Multi) 08/15/2020    Secondary hypertension 10/18/2022    Mixed hyperlipidemia 2016    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) (Multi) 2022    Nausea & vomiting 2023    Diabetes mellitus without complication 2022    Type 2 DM with diabetic neuropathy affecting both sides of body 2010    Chest pain 2024     Past Medical History:   Diagnosis Date    Anemia     Anxiety     Celiac disease (HHS-HCC)     Colon polyp     DM (diabetes mellitus) (Multi)     Fibromyalgia, primary 10 yrs    Frozen shoulder 4: yrs ago    GERD (gastroesophageal reflux disease)     Hypertension     Lung disease     Lupus     Osteoarthritis     Personal history of other diseases of the circulatory system     Personal history of other endocrine, nutritional and metabolic disease     Plantar fasciitis     Sciatica 2025    Spinal stenosis 10 yrs ago     PSHx:  Past Surgical History:   Procedure Laterality Date    ABLATION, PERCUTANEOUS, CRYOABLATION, INCLUDES IMAGING GUIDANCE; NERVE PLEXUS OR OTHER FADUMO  15 yrs     SECTION, LOW TRANSVERSE      CHOLECYSTECTOMY      FEMUR FRACTURE SURGERY      HYSTERECTOMY      IR ABLATION BONE      MR HEAD ANGIO WO IV CONTRAST  2019    MR HEAD ANGIO WO IV CONTRAST [facility]    MR HEAD ANGIO WO IV CONTRAST  2019    MR HEAD ANGIO WO IV CONTRAST [facility]    MR NECK ANGIO WO IV CONTRAST  2019    MR NECK ANGIO WO IV CONTRAST [facility]    SPINAL FUSION        ***  Have you had your wisdom teeth removed: ***    FHx:  Family History   Problem Relation Name Age of Onset    Celiac disease Mother Josemanuel     Lupus Mother Grandma 50 - 59    Hypertension Mother Grandma 40 - 49    Heart disease Mother Grandma 60 - 69    Miscarriages / Stillbirths Mother Grandma 20 - 29    Vision loss Mother  Grandma 70 - 79    Autoimmune disease Mother Grandma 50 - 59    Arthritis Maternal Grandmother  40 - 49    Asthma Father Mother too and Aunty 10 - 19    Cancer Father's Brother My Dad     COPD Mother's Sister My Mom , myself , Son 0 - 9    Diabetes Paternal Grandmother All thru the family 30 - 39    Stroke Maternal Grandfather Grandmother 70 - 79      Mother: ***  Father: ***  Siblings: ***  Children: ***  Miscarriages: ***    Social Hx:  Josemanuel Reinoso    reports that she has been smoking cigarettes. She has a 5 pack-year smoking history. She has never been exposed to tobacco smoke. She has never used smokeless tobacco.  She  reports that she does not currently use alcohol.  She  reports no history of drug use.  Social History     Socioeconomic History    Marital status: Single   Tobacco Use    Smoking status: Some Days     Current packs/day: 0.50     Average packs/day: 0.5 packs/day for 10.0 years (5.0 ttl pk-yrs)     Types: Cigarettes     Passive exposure: Never    Smokeless tobacco: Never   Vaping Use    Vaping status: Never Used   Substance and Sexual Activity    Alcohol use: Not Currently    Drug use: Never    Sexual activity: Not Currently     Partners: Male     Birth control/protection: Abstinence     Social Drivers of Health     Financial Resource Strain: Medium Risk (5/13/2024)    Received from [employer]    Overall Financial Resource Strain (CARDIA)     Difficulty of Paying Living Expenses: Somewhat hard   Food Insecurity: No Food Insecurity (5/13/2024)    Received from [employer]    Hunger Vital Sign     Worried About Running Out of Food in the Last Year: Never true     Ran Out of Food in the Last Year: Never true   Transportation Needs: No Transportation Needs (5/13/2024)    Received from [employer]    PRAPARE - Transportation     Lack of Transportation (Medical): No     Lack of Transportation (Non-Medical): No   Physical Activity: Inactive (5/13/2024)    Received from [employer]    Exercise Vital  Sign     Days of Exercise per Week: 0 days     Minutes of Exercise per Session: 20 min   Stress: No Stress Concern Present (5/13/2024)    Received from [employer]    [employer] [place] [employer] - Occupational Stress Questionnaire     Feeling of Stress : Only a little   Social Connections: Moderately Integrated (5/13/2024)    Received from [employer]    Social Connection and Isolation Panel     Frequency of Communication with Friends and Family: Three times a week     Frequency of Social Gatherings with Friends and Family: Once a week     Attends [employer] Services: More than 4 times per year     Active Member of Clubs or Organizations: Yes     Attends Club or Organization Meetings: More than 4 times per year     Marital Status: Never    Intimate Partner Violence: Unknown (5/13/2024)    Received from [employer]    Humiliation, Afraid, Rape, and Kick questionnaire     Fear of Current or Ex-Partner: Patient declined     Emotionally Abused: No     Physically Abused: No     Sexually Abused: No   Housing Stability: Unknown (3/14/2024)    Housing Stability Vital Sign     Unable to Pay for Housing in the Last Year: No      Living Situation: ***  Occupation: ***  Marital Status: ***  Alcohol Use: ***  Smoking: ***  Recreational Drug Use: ***  Special Diets: ***  Ethnicity: ***  Orthodoxy: ***    Cancer Screenings:  Upper EGD: 2/6/25 - Grade A esophagitis, non-obstructing Schatzki ring  Colonoscopy: 2/6/25 - Polyp in transverse colon, medium scattered diverticula of moderate severity in descending and sigmoid colon, internal grade 1 hemorrhoids   Capsule endoscopy: 5/22/25 - Normal  Mammogram: 6/26/24  PAP smear: No record in EMR  Lung cancer screenings: No record in EMR    Medications and allergies reviewed in EMR.    ROS:  Review of Systems - Oncology   10 point review of systems negative except as state in HPI.    Vitals & Statistics:  Objective   BSA: There is no height or weight on file to calculate  "BSA.  There were no vitals taken for this visit.    Physical Exam:  Physical Exam      Results:  Lab Results   Component Value Date    WBC 7.5 04/26/2025    NEUTROABS 3.67 04/26/2025    IGABSOL 0.01 04/26/2025    LYMPHSABS 3.12 04/26/2025    MONOSABS 0.58 04/26/2025    EOSABS 0.06 04/26/2025    BASOSABS 0.04 04/26/2025    RBC 4.87 04/26/2025    MCV 81 04/26/2025    MCHC 29.6 (L) 04/26/2025    HGB 11.6 (L) 04/26/2025    HCT 39.2 04/26/2025     04/26/2025     Lab Results   Component Value Date    RETICCTPCT 1.4 05/14/2025      Lab Results   Component Value Date    CREATININE 0.80 04/26/2025    BUN 8 04/26/2025    EGFR 84 04/26/2025     04/26/2025    K 4.2 04/26/2025     04/26/2025    CO2 26 04/26/2025      Lab Results   Component Value Date    ALT 6 (L) 04/26/2025    AST 14 04/26/2025    GGT 35 04/23/2025    ALKPHOS 109 04/26/2025    BILITOT 0.3 04/26/2025      No results found for: \"TSH\"  No results found for: \"TSH\", \"N3JKLHN\", \"V7MRDIZ\", \"THYROIDPAB\"  Lab Results   Component Value Date    IRON 45 05/14/2025    TIBC 363 05/14/2025    FERRITIN 32 05/14/2025      Lab Results   Component Value Date    KKPYBBKW98 257 05/14/2025      No results found for: \"FOLATE\"  Lab Results   Component Value Date    RUSSELL POSITIVE (A) 05/14/2025    SEDRATE 24 (H) 07/14/2019      Lab Results   Component Value Date    CRP 1.08 (H) 01/14/2025      No results found for: \"EFFIE\"  No results found for: \"LDH\"  No results found for: \"HAPTOGLOBIN\"  No results found for: \"SPEP\"  No results found for: \"IGG\", \"IGM\", \"IGA\"  Lab Results   Component Value Date    HEPAIGM NON-REACTIVE 04/23/2025    HEPBCIGM NON-REACTIVE 04/23/2025    HEPBSAG NON-REACTIVE 04/23/2025    HEPCAB NON-REACTIVE 04/23/2025     No results found for: \"HIV1X2\"    Assessment:  Josemanuel Reinoso is a 60 y.o. female referred for consultation of anemia    I reviewed patient's chart including but not limited to labs, imaging, surgical/procedure notes, pathology, hospital " notes, doctor's notes.    7/21/25 results:  ***    Plan:  Discussed possible etiologies including multifactorial, nutritional deficiencies, anemia of chronic disease, malabsorption, hemopathy, medications, bleeding, malignancy, etc. Will start hematological workup today.    Anemia  Lab results pending - Will review when available and address adverse results as needed  RTC in 2-4 weeks via {BDFUVAP3:17461} - F/U sooner if needed/urgent    I had an extensive discussion with the patient regarding the diagnosis and discussed the plan of therapy, including general considerations regarding side effects and outcomes. Pt understood and gave appropriate teach back about the plan of care. All questions were answered to the patient's satisfaction. The patient is instructed to contact us at any time if questions or problems arise. Thank you for the opportunity to participate in the care of this very pleasant patient.    Total time = 80 minutes. 50% or more of this time was spent in counseling and/or coordination of care including reviewing medical history/radiology/labs, examining patient, formulating outlined plan with team, and discussing plan with patient/family.    Tacho Sagastume PA-C

## 2025-07-24 ENCOUNTER — APPOINTMENT (OUTPATIENT)
Dept: RADIOLOGY | Facility: HOSPITAL | Age: 60
End: 2025-07-24
Payer: COMMERCIAL

## 2025-07-24 ENCOUNTER — APPOINTMENT (OUTPATIENT)
Dept: CARDIOLOGY | Facility: HOSPITAL | Age: 60
End: 2025-07-24
Payer: COMMERCIAL

## 2025-07-24 ENCOUNTER — HOSPITAL ENCOUNTER (EMERGENCY)
Facility: HOSPITAL | Age: 60
Discharge: HOME | End: 2025-07-25
Attending: STUDENT IN AN ORGANIZED HEALTH CARE EDUCATION/TRAINING PROGRAM
Payer: COMMERCIAL

## 2025-07-24 DIAGNOSIS — R07.9 CHEST PAIN, UNSPECIFIED TYPE: ICD-10-CM

## 2025-07-24 DIAGNOSIS — R91.1 PULMONARY NODULE: ICD-10-CM

## 2025-07-24 DIAGNOSIS — M79.18 MUSCULOSKELETAL PAIN: Primary | ICD-10-CM

## 2025-07-24 LAB
ALBUMIN SERPL BCP-MCNC: 3.9 G/DL (ref 3.4–5)
ALP SERPL-CCNC: 105 U/L (ref 33–136)
ALT SERPL W P-5'-P-CCNC: 8 U/L (ref 7–45)
ANION GAP SERPL CALC-SCNC: 13 MMOL/L (ref 10–20)
AST SERPL W P-5'-P-CCNC: 15 U/L (ref 9–39)
BASOPHILS # BLD AUTO: 0.04 X10*3/UL (ref 0–0.1)
BASOPHILS NFR BLD AUTO: 0.4 %
BILIRUB SERPL-MCNC: 0.3 MG/DL (ref 0–1.2)
BUN SERPL-MCNC: 9 MG/DL (ref 6–23)
CALCIUM SERPL-MCNC: 9 MG/DL (ref 8.6–10.3)
CARDIAC TROPONIN I PNL SERPL HS: 6 NG/L (ref 0–13)
CARDIAC TROPONIN I PNL SERPL HS: 7 NG/L (ref 0–13)
CHLORIDE SERPL-SCNC: 106 MMOL/L (ref 98–107)
CO2 SERPL-SCNC: 26 MMOL/L (ref 21–32)
CREAT SERPL-MCNC: 0.82 MG/DL (ref 0.5–1.05)
D DIMER PPP FEU-MCNC: 2443 NG/ML FEU
EGFRCR SERPLBLD CKD-EPI 2021: 81 ML/MIN/1.73M*2
EOSINOPHIL # BLD AUTO: 0.05 X10*3/UL (ref 0–0.7)
EOSINOPHIL NFR BLD AUTO: 0.5 %
ERYTHROCYTE [DISTWIDTH] IN BLOOD BY AUTOMATED COUNT: 17.6 % (ref 11.5–14.5)
GLUCOSE SERPL-MCNC: 90 MG/DL (ref 74–99)
HCT VFR BLD AUTO: 35.8 % (ref 36–46)
HGB BLD-MCNC: 10.8 G/DL (ref 12–16)
IMM GRANULOCYTES # BLD AUTO: 0.04 X10*3/UL (ref 0–0.7)
IMM GRANULOCYTES NFR BLD AUTO: 0.4 % (ref 0–0.9)
LYMPHOCYTES # BLD AUTO: 3.53 X10*3/UL (ref 1.2–4.8)
LYMPHOCYTES NFR BLD AUTO: 35.5 %
MCH RBC QN AUTO: 24.7 PG (ref 26–34)
MCHC RBC AUTO-ENTMCNC: 30.2 G/DL (ref 32–36)
MCV RBC AUTO: 82 FL (ref 80–100)
MONOCYTES # BLD AUTO: 0.67 X10*3/UL (ref 0.1–1)
MONOCYTES NFR BLD AUTO: 6.7 %
NEUTROPHILS # BLD AUTO: 5.6 X10*3/UL (ref 1.2–7.7)
NEUTROPHILS NFR BLD AUTO: 56.5 %
NRBC BLD-RTO: 0 /100 WBCS (ref 0–0)
PLATELET # BLD AUTO: 325 X10*3/UL (ref 150–450)
POTASSIUM SERPL-SCNC: 4.1 MMOL/L (ref 3.5–5.3)
PROT SERPL-MCNC: 7.2 G/DL (ref 6.4–8.2)
RBC # BLD AUTO: 4.38 X10*6/UL (ref 4–5.2)
SODIUM SERPL-SCNC: 141 MMOL/L (ref 136–145)
WBC # BLD AUTO: 9.9 X10*3/UL (ref 4.4–11.3)

## 2025-07-24 PROCEDURE — 85379 FIBRIN DEGRADATION QUANT: CPT | Performed by: STUDENT IN AN ORGANIZED HEALTH CARE EDUCATION/TRAINING PROGRAM

## 2025-07-24 PROCEDURE — 71045 X-RAY EXAM CHEST 1 VIEW: CPT | Performed by: RADIOLOGY

## 2025-07-24 PROCEDURE — 93005 ELECTROCARDIOGRAM TRACING: CPT

## 2025-07-24 PROCEDURE — 80053 COMPREHEN METABOLIC PANEL: CPT | Performed by: STUDENT IN AN ORGANIZED HEALTH CARE EDUCATION/TRAINING PROGRAM

## 2025-07-24 PROCEDURE — 84484 ASSAY OF TROPONIN QUANT: CPT | Performed by: STUDENT IN AN ORGANIZED HEALTH CARE EDUCATION/TRAINING PROGRAM

## 2025-07-24 PROCEDURE — 85025 COMPLETE CBC W/AUTO DIFF WBC: CPT | Performed by: STUDENT IN AN ORGANIZED HEALTH CARE EDUCATION/TRAINING PROGRAM

## 2025-07-24 PROCEDURE — 99285 EMERGENCY DEPT VISIT HI MDM: CPT | Mod: 25 | Performed by: STUDENT IN AN ORGANIZED HEALTH CARE EDUCATION/TRAINING PROGRAM

## 2025-07-24 PROCEDURE — 84075 ASSAY ALKALINE PHOSPHATASE: CPT | Performed by: PHYSICIAN ASSISTANT

## 2025-07-24 PROCEDURE — 36415 COLL VENOUS BLD VENIPUNCTURE: CPT | Performed by: STUDENT IN AN ORGANIZED HEALTH CARE EDUCATION/TRAINING PROGRAM

## 2025-07-24 PROCEDURE — 71045 X-RAY EXAM CHEST 1 VIEW: CPT

## 2025-07-24 PROCEDURE — 36415 COLL VENOUS BLD VENIPUNCTURE: CPT | Performed by: PHYSICIAN ASSISTANT

## 2025-07-24 ASSESSMENT — LIFESTYLE VARIABLES
EVER HAD A DRINK FIRST THING IN THE MORNING TO STEADY YOUR NERVES TO GET RID OF A HANGOVER: NO
EVER FELT BAD OR GUILTY ABOUT YOUR DRINKING: NO
HAVE PEOPLE ANNOYED YOU BY CRITICIZING YOUR DRINKING: NO
TOTAL SCORE: 0
HAVE YOU EVER FELT YOU SHOULD CUT DOWN ON YOUR DRINKING: NO

## 2025-07-24 ASSESSMENT — PAIN SCALES - GENERAL: PAINLEVEL_OUTOF10: 9

## 2025-07-24 ASSESSMENT — PAIN DESCRIPTION - PAIN TYPE: TYPE: ACUTE PAIN

## 2025-07-24 ASSESSMENT — PAIN DESCRIPTION - LOCATION: LOCATION: CHEST

## 2025-07-24 ASSESSMENT — PAIN - FUNCTIONAL ASSESSMENT: PAIN_FUNCTIONAL_ASSESSMENT: 0-10

## 2025-07-24 ASSESSMENT — PAIN DESCRIPTION - ORIENTATION: ORIENTATION: LEFT

## 2025-07-25 ENCOUNTER — APPOINTMENT (OUTPATIENT)
Dept: RADIOLOGY | Facility: HOSPITAL | Age: 60
End: 2025-07-25
Payer: COMMERCIAL

## 2025-07-25 VITALS
RESPIRATION RATE: 22 BRPM | BODY MASS INDEX: 46.01 KG/M2 | HEART RATE: 67 BPM | TEMPERATURE: 98.6 F | OXYGEN SATURATION: 100 % | DIASTOLIC BLOOD PRESSURE: 78 MMHG | HEIGHT: 62 IN | SYSTOLIC BLOOD PRESSURE: 150 MMHG | WEIGHT: 250 LBS

## 2025-07-25 LAB
ATRIAL RATE: 68 BPM
ATRIAL RATE: 84 BPM
P AXIS: 18 DEGREES
P AXIS: 27 DEGREES
P OFFSET: 207 MS
P ONSET: 147 MS
PR INTERVAL: 158 MS
PR INTERVAL: 165 MS
Q ONSET: 226 MS
Q ONSET: 251 MS
QRS COUNT: 11 BEATS
QRS COUNT: 14 BEATS
QRS DURATION: 94 MS
QRS DURATION: 97 MS
QT INTERVAL: 370 MS
QT INTERVAL: 390 MS
QTC CALCULATION(BAZETT): 415 MS
QTC CALCULATION(BAZETT): 437 MS
QTC FREDERICIA: 406 MS
QTC FREDERICIA: 413 MS
R AXIS: 1 DEGREES
R AXIS: 17 DEGREES
T AXIS: 15 DEGREES
T AXIS: 33 DEGREES
T OFFSET: 411 MS
T OFFSET: 446 MS
VENTRICULAR RATE: 68 BPM
VENTRICULAR RATE: 84 BPM

## 2025-07-25 PROCEDURE — 96374 THER/PROPH/DIAG INJ IV PUSH: CPT | Mod: 59

## 2025-07-25 PROCEDURE — 2550000001 HC RX 255 CONTRASTS: Performed by: STUDENT IN AN ORGANIZED HEALTH CARE EDUCATION/TRAINING PROGRAM

## 2025-07-25 PROCEDURE — 2500000004 HC RX 250 GENERAL PHARMACY W/ HCPCS (ALT 636 FOR OP/ED): Performed by: STUDENT IN AN ORGANIZED HEALTH CARE EDUCATION/TRAINING PROGRAM

## 2025-07-25 PROCEDURE — 71275 CT ANGIOGRAPHY CHEST: CPT | Performed by: RADIOLOGY

## 2025-07-25 PROCEDURE — 71275 CT ANGIOGRAPHY CHEST: CPT

## 2025-07-25 RX ORDER — CYCLOBENZAPRINE HCL 10 MG
5 TABLET ORAL 2 TIMES DAILY PRN
Qty: 10 TABLET | Refills: 0 | Status: SHIPPED | OUTPATIENT
Start: 2025-07-25 | End: 2025-08-04

## 2025-07-25 RX ORDER — KETOROLAC TROMETHAMINE 30 MG/ML
15 INJECTION, SOLUTION INTRAMUSCULAR; INTRAVENOUS ONCE
Status: COMPLETED | OUTPATIENT
Start: 2025-07-25 | End: 2025-07-25

## 2025-07-25 RX ADMIN — KETOROLAC TROMETHAMINE 15 MG: 30 INJECTION, SOLUTION INTRAMUSCULAR at 00:59

## 2025-07-25 RX ADMIN — IOHEXOL 75 ML: 350 INJECTION, SOLUTION INTRAVENOUS at 01:24

## 2025-07-25 ASSESSMENT — PAIN DESCRIPTION - LOCATION: LOCATION: RIB CAGE

## 2025-07-25 ASSESSMENT — PAIN SCALES - GENERAL: PAINLEVEL_OUTOF10: 3

## 2025-07-25 ASSESSMENT — PAIN - FUNCTIONAL ASSESSMENT: PAIN_FUNCTIONAL_ASSESSMENT: 0-10

## 2025-07-25 ASSESSMENT — PAIN DESCRIPTION - ORIENTATION: ORIENTATION: LEFT

## 2025-07-25 NOTE — ED PROVIDER NOTES
EMERGENCY DEPARTMENT ENCOUNTER      Pt Name: Josemanuel Reinoso  MRN: [id number]  Birthdate 1965  Date of evaluation: 7/24/2025  Provider: Abdifatah Brown DO    CHIEF COMPLAINT       Chief Complaint   Patient presents with    Chest Pain     Pt coming from home with c/o chest pain. Pt states that she has been having CP for the past couple of days. States that the pain is a sharp stabbing pain. Pt also endorses SOB. Has hx of asthma. States that she was given an abx for it but did not get around to taking the steroids. Pt has hx of tachycardia.        HISTORY OF PRESENT ILLNESS    Josemanuel Reinoso is a 60 y.o. female who presents to the emergency department with For self for chest pain with associated shortness of breath.  Patient reports that she recently saw her primary physician and was prescribed antibiotic as well as prednisone.  She in fact did not take the prednisone but completed the antibiotic course.  His symptoms have not improved she is presenting today for further evaluation.  Of note she did recently travel to [place] approximately 1 month ago.  Denies any history of coagulopathies.  She does have underlying asthma although this does not feel consistent with the asthma.  Her pain is highly reproducible with rotary motions and bending.  She does admit to pushing a couch which was very heavy as of recently pain subsequently started shortly thereafter.          Nursing Notes were reviewed.    REVIEW OF SYSTEMS     CONSTITUTIONAL: Denies fever, sweats, chills.   NEURO: Denies difficulty walking, numbness, weakness, tingling, headache.   HEENT: Denies sore throat, rhinorrhea, changes in vision.   CARDIO: Endorses chest pain.  Denies palpitations.  PULM: Endorses shortness of breath.  Denies cough.   GI: Denies abdominal pain, nausea, vomiting, diarrhea, constipation, melena, hematochezia.  : Denies painful urination, frequency, hematuria.   MSK: Denies recent trauma.   SKIN: Denies rash, lesions.    ENDOCRINE: Denies unexpected weight-loss.   HEME: Denies bleeding disorder.     PAST MEDICAL HISTORY   Medical History[1]    SURGICAL HISTORY     Surgical History[2]    ALLERGIES     Cephalexin, Azithromycin, Doxycycline, Erythromycin, Fluticasone propion-salmeterol, Ibuprofen, Lamotrigine, Metformin, Penicillins, Pregabalin, Estradiol, and Nickel    FAMILY HISTORY     Family History[3]     SOCIAL HISTORY     Social History[4]    PHYSICAL EXAM   VS: As documented in the triage note from today's date and EMR flowsheet were reviewed.  Gen: Obese. No acute distress. Seated in bed. Appears nontoxic.  GCS 15.  Skin: Warm. Dry. Intact. No rashes or lesions.  Eyes: Pupils equally round and reactive to light. Clear sclera.   HENT: Atraumatic appearance. Mucosal membranes moist. No oral lesions, uvula midline, airway patent.   CV: Regular rate and regular rhythm. S1, S2. No pedal edema. Warm extremities.  Resp: Nonlabored breathing Clear to auscultation bilaterally. No increased work of breathing.   GI: Soft and nontender. No rebound or guarding. Bowel sounds x4 present.   MSK: Symmetric muscle bulk. No joint swelling in the extremities. Compartments are soft. Neurovascularly intact x4 extremities. Radial pulses +2 equal bilaterally.  Pedal pulses +2 equal bilaterally.  Highly reproducible costal sternal chest pain with palpation.  Neuro: Alert.  Speech fluent. Moving all extremities. No focal deficits. Gait normal.  Psych: Appropriate. Kempt.    DIAGNOSTIC RESULTS   RADIOLOGY:   Non-plain film images such as CT, Ultrasound and MRI are read by the radiologist. Plain radiographic images are visualized and preliminarily interpreted by the emergency physician with the below findings: Chest x-ray no widened mediastinum or infiltrates      Interpretation per the Radiologist below, if available at the time of this note:    XR chest 1 view   Final Result   No airspace consolidation or pleural effusion.        MACRO:   None         Signed by: Reynaldo Mcelroy 7/24/2025 9:45 PM   Dictation workstation:   [id number]            ED BEDSIDE ULTRASOUND:   Performed by ED Physician - none    LABS:  Labs Reviewed   CBC WITH AUTO DIFFERENTIAL - Abnormal       Result Value    WBC 9.9      nRBC 0.0      RBC 4.38      Hemoglobin 10.8 (*)     Hematocrit 35.8 (*)     MCV 82      MCH 24.7 (*)     MCHC 30.2 (*)     RDW 17.6 (*)     Platelets 325      Neutrophils % 56.5      Immature Granulocytes %, Automated 0.4      Lymphocytes % 35.5      Monocytes % 6.7      Eosinophils % 0.5      Basophils % 0.4      Neutrophils Absolute 5.60      Immature Granulocytes Absolute, Automated 0.04      Lymphocytes Absolute 3.53      Monocytes Absolute 0.67      Eosinophils Absolute 0.05      Basophils Absolute 0.04     COMPREHENSIVE METABOLIC PANEL - Normal    Glucose 90      Sodium 141      Potassium 4.1      Chloride 106      Bicarbonate 26      Anion Gap 13      Urea Nitrogen 9      Creatinine 0.82      eGFR 81      Calcium 9.0      Albumin 3.9      Alkaline Phosphatase 105      Total Protein 7.2      AST 15      Bilirubin, Total 0.3      ALT 8     SERIAL TROPONIN-INITIAL - Normal    Troponin I, High Sensitivity 6      Narrative:     Less than 99th percentile of normal range cutoff-  Female and children under 18 years old <14 ng/L; Male <21 ng/L: Negative  Repeat testing should be performed if clinically indicated.     Female and children under 18 years old 14-50 ng/L; Male 21-50 ng/L:  Consistent with possible cardiac damage and possible increased clinical   risk. Serial measurements may help to assess extent of myocardial damage.     >50 ng/L: Consistent with cardiac damage, increased clinical risk and  myocardial infarction. Serial measurements may help assess extent of   myocardial damage.      NOTE: Children less than 1 year old may have higher baseline troponin   levels and results should be interpreted in conjunction with the overall   clinical context.     NOTE:  Troponin I testing is performed using a different   testing methodology at [facility] than at other   [facility]. Direct result comparisons should only   be made within the same method.   SERIAL TROPONIN, 1 HOUR - Normal    Troponin I, High Sensitivity 7      Narrative:     Less than 99th percentile of normal range cutoff-  Female and children under 18 years old <14 ng/L; Male <21 ng/L: Negative  Repeat testing should be performed if clinically indicated.     Female and children under 18 years old 14-50 ng/L; Male 21-50 ng/L:  Consistent with possible cardiac damage and possible increased clinical   risk. Serial measurements may help to assess extent of myocardial damage.     >50 ng/L: Consistent with cardiac damage, increased clinical risk and  myocardial infarction. Serial measurements may help assess extent of   myocardial damage.      NOTE: Children less than 1 year old may have higher baseline troponin   levels and results should be interpreted in conjunction with the overall   clinical context.     NOTE: Troponin I testing is performed using a different   testing methodology at [facility] than at other   [facility]. Direct result comparisons should only   be made within the same method.   TROPONIN SERIES- (INITIAL, 1 HR)    Narrative:     The following orders were created for panel order Troponin I Series, High Sensitivity (0, 1 HR).  Procedure                               Abnormality         Status                     ---------                               -----------         ------                     Troponin I, High Sensiti...[[id number]]  Normal              Final result               Troponin, High Sensitivi...[[id number]]  Normal              Final result                 Please view results for these tests on the individual orders.   D-DIMER, NON VTE       All other labs were within normal range or not returned as of this dictation.    EMERGENCY DEPARTMENT  "COURSE/MDM:   Vitals:    Vitals:    07/24/25 1957 07/24/25 2130   BP: 178/87 (!) 181/93   BP Location: Right arm    Patient Position: Sitting    Pulse: 86 68   Resp: 19 20   Temp: 37 °C (98.6 °F)    TempSrc: Temporal    SpO2: 100% 100%   Weight: 113 kg (250 lb)    Height: 1.575 m (5' 2\")        I reviewed the patient's triage vitals and they are hypertensive recommend follow-up with primary physician for repeat checks.    Due to the above findings the following was ordered cardiac workup to include D-dimer.    Lab work reveals mild anemia slightly lower than baseline no active signs of bleeding he medically stable recommended close outpatient follow-up with primary provider.  No leukocytosis make infectious etiology less likely.  There are no significant electrolyte derangements.  Renal function is appropriate.  Chest x-ray is clear.  Cardiac troponin x 2 within normal range no acute injury pattern on EKG coupled with the fact that pain is highly reproducible and patient had recent potential injury with moving suspect this is likely not ACS.  D-dimer is pending patient was signed out to the oncoming physician to follow-up with D-dimer and disposition.    HEART Score:    History:   [x  ] Slightly suspicious - 0   [  ] Moderately suspicious - 1  [  ] Highly suspicious - 2     EKG:   [ x ] Normal - 0    [  ] Non-specific repolarization disturbance (No ST changes, but LBBB, LVH, repolarization changes)  - 1   [  ] Significant ST deviation (ST changes not d/t LBBB, LVH, digoxin)  - 2     Age:   [  ] < 45 - 0   [  x] 45-64 - 1   [  ] > 65 - 2     Risk Factors:     HTN, HLD, DM, obesity (BMI > 30), smoking (current or w/I 3mo), + fmx (before age of 65), CAD, prior MI, PCI/CABG, CVA/TIA, PAD  [  ] No known risk factors - 0   [  ] 1-2 risk factors - 1    [ x ] >3 risk factors or hx of atherosclerotic disease - 2     Initial troponin:  [ x ] < normal limit - 0   [  ] 1 - 3x normal limit - 1   [  ] > 3x normal limit - " 2    Total:   [ x] 0-3 Points: 1.7% risk of major adverse cardiac event in 6 weeks  [ ] 4-6 Points: 12-16.6% risk of major adverse cardiac event in 6 weeks  [ ] 7-10 Points 50-65% risk of major adverse cardiac event in 6 weeks      ED Course as of 07/25/25 0312   Thu Jul 24, 2025 2058 Interpreted by the Emergency Department Attending: ECG revealed normal sinus rhythm at a rate of 84 beats per minute with MA interval 158 , QRS of 94 , QTc of 437.  No acute injury pattern. Previous EKG on April 26 revealed no significant changes.    [MG]   2128 Interpreted by the Emergency Department Attending: ECG revealed normal sinus rhythm at a rate of 68 beats per minute with MA interval 165 , QRS of 97 , QTc of 415.  No acute injury pattern.  [MG]   2259 CP, reproducible, x 2 weeks.  Initially treated with Atb.  Waiting on Dimer, had travel.  If Dimer negative d/c [BS]      ED Course User Index  [BS] Derick Serna MD  [MG] Abdifatah Brown DO         Diagnoses as of 07/25/25 0312   Chest pain, unspecified type       Patient was counseled regarding labs, imaging, likely diagnosis, and plan. All questions were answered.     ------------------------------------------------------------------  Information provided by the patient  Past medical history complicating workup asthma  Previous medical records reviewed office visit 7/23/2025  ------------------------------------------------------------------  ED Medications administered this visit:  Medications - No data to display       Final Impression:    Chest pain, unspecified type          Abdifatah Brown DO    (Please note that portions of this note were completed with a voice recognition program.  Efforts were made to edit the dictations but occasionally words are mis-transcribed.)       [1]   Past Medical History:  Diagnosis Date    Anemia     Anxiety     Asthma     Celiac disease ([facility])     Chronic constipation     Colon polyp     DM (diabetes mellitus) ([facility])      Fibromyalgia, primary 10 yrs    Frozen shoulder 4: yrs ago    GERD (gastroesophageal reflux disease)     Hyperlipidemia     Hypertension     Low back pain     Lung disease     Lupus     Osteoarthritis     Personal history of other diseases of the circulatory system     History of hypertension    Personal history of other endocrine, nutritional and metabolic disease     History of diabetes mellitus    Plantar fasciitis     Sciatica 2025    Spinal stenosis 10 yrs ago   [2]   Past Surgical History:  Procedure Laterality Date    ABLATION, PERCUTANEOUS, CRYOABLATION, INCLUDES IMAGING GUIDANCE; NERVE PLEXUS OR OTHER FADUMO  15 yrs     SECTION, LOW TRANSVERSE      CHOLECYSTECTOMY      FEMUR FRACTURE SURGERY      HYSTERECTOMY      IR ABLATION BONE      MR HEAD ANGIO WO IV CONTRAST  2019    MR HEAD ANGIO WO IV CONTRAST [facility]    MR HEAD ANGIO WO IV CONTRAST  2019    MR HEAD ANGIO WO IV CONTRAST [facility]    MR NECK ANGIO WO IV CONTRAST  2019    MR NECK ANGIO WO IV CONTRAST [facility]    SPINAL FUSION     [3]   Family History  Problem Relation Name Age of Onset    Celiac disease Mother Josemanuel     Lupus Mother Grandma 50 - 59    Hypertension Mother Grandma 40 - 49    Heart disease Mother Grandma 60 - 69    Miscarriages / Stillbirths Mother Grandma 20 - 29    Vision loss Mother Grandma 70 - 79    Autoimmune disease Mother Grandma 50 - 59    Arthritis Maternal Grandmother  40 - 49    Asthma Father Mother too and Aunty 10 - 19    Cancer Father's Brother My Dad     COPD Mother's Sister My Mom , myself , Son 0 - 9    Diabetes Paternal Grandmother All thru the family 30 - 39    Stroke Maternal Grandfather Grandmother 70 - 79   [4]   Social History  Socioeconomic History    Marital status: Single   Tobacco Use    Smoking status: Some Days     Current packs/day: 0.50     Average packs/day: 0.5 packs/day for 10.0 years (5.0 ttl pk-yrs)     Types: Cigarettes      Passive exposure: Never    Smokeless tobacco: Never   Vaping Use    Vaping status: Never Used   Substance and Sexual Activity    Alcohol use: Not Currently    Drug use: Never    Sexual activity: Not Currently     Partners: Male     Birth control/protection: Abstinence     Social Drivers of Health     Financial Resource Strain: Medium Risk (5/13/2024)    Received from [facility]    Overall Financial Resource Strain (CARDIA)     Difficulty of Paying Living Expenses: Somewhat hard   Food Insecurity: No Food Insecurity (5/13/2024)    Received from [facility]    Hunger Vital Sign     Within the past 12 months, you worried that your food would run out before you got the money to buy more.: Never true     Within the past 12 months, the food you bought just didn't last and you didn't have money to get more.: Never true   Transportation Needs: No Transportation Needs (5/13/2024)    Received from [facility]    PRAPARE - Transportation     Lack of Transportation (Medical): No     Lack of Transportation (Non-Medical): No   Physical Activity: Inactive (5/13/2024)    Received from [facility]    Exercise Vital Sign     On average, how many days per week do you engage in moderate to strenuous exercise (like a brisk walk)?: 0 days     On average, how many minutes do you engage in exercise at this level?: 20 min   Stress: No Stress Concern Present (5/13/2024)    Received from [facility]    [employer] [place] [employer] - Occupational Stress Questionnaire     Feeling of Stress : Only a little   Social Connections: Moderately Integrated (5/13/2024)    Received from [facility]    Social Connection and Isolation Panel     In a typical week, how many times do you talk on the phone with family, friends, or neighbors?: Three times a week     How often do you get together with friends or relatives?: Once a week     How often do you attend Taoist or Gnosticism services?: More than 4 times per year     Do you belong to any  clubs or organizations such as Yazidi groups, unions, fraternal or athletic groups, or school groups?: Yes     How often do you attend meetings of the clubs or organizations you belong to?: More than 4 times per year     Are you , , , , never , or living with a partner?: Never    Intimate Partner Violence: Unknown (5/13/2024)    Received from [facility]    Humiliation, Afraid, Rape, and Kick questionnaire     Within the last year, have you been afraid of your partner or ex-partner?: Patient declined     Within the last year, have you been humiliated or emotionally abused in other ways by your partner or ex-partner?: No     Within the last year, have you been kicked, hit, slapped, or otherwise physically hurt by your partner or ex-partner?: No     Within the last year, have you been raped or forced to have any kind of sexual activity by your partner or ex-partner?: No   Housing Stability: Unknown (3/14/2024)    Housing Stability Vital Sign     Unable to Pay for Housing in the Last Year: No        Abdifatah Brown, DO  07/25/25 0312

## 2025-07-25 NOTE — ED NOTES
Pt coming from home with c/o chest pain. Pt states that she has been having CP for the past couple of days. States that the pain is a sharp stabbing pain. Pt also endorses SOB. Has hx of asthma. States that she was given an abx for it but did not get around to taking the steroids. Pt has hx of tachycardia.      Quita Powell RN  07/24/25 2001

## 2025-07-25 NOTE — ED PROVIDER NOTES
Emergency Department Provider Note       Patient coming in for chest pain.  Has a history of asthma was recently treated for a COPD exacerbation with antibiotics but did not take steroids.  Has had the pain for the past couple weeks.  It is able to be reciprocated on physical exam but due to the location and the could be pleuritic a D-dimer was ordered.  Her ACS workup is already unremarkable and chest x-ray unremarkable as well.  No indication for further antibiotics or further ACS workup.  If D-dimer negative can discharge.  If positive will need a CTA.    Dimer elevated over 2000.  CTA ordered    CTA negative.  Pulmonary nodule noted.  Patient updated and was given medications for her chest wall pain to go home with.  She did request muscle relaxer.  This has helped her before.  It was sent to her pharmacy of choice.  No further questions.  Patient discharged with discharge instruction strict return precaution                                                       Derick Serna MD  07/25/25 3610

## 2025-07-25 NOTE — DISCHARGE INSTRUCTIONS
You have a pulmonary nodule noted 6 mm.  There is a recommended CT scan follow-up within the next 12 months.  Otherwise no pulmonary embolism.  He most likely just have musculoskeletal pain.  Your cardiac workup was also negative.  Will give you a prescription for lidocaine patches.  Return if symptoms worsen

## 2025-07-28 ENCOUNTER — PATIENT OUTREACH (OUTPATIENT)
Dept: CARE COORDINATION | Facility: CLINIC | Age: 60
End: 2025-07-28
Payer: COMMERCIAL

## 2025-07-28 DIAGNOSIS — R11.2 NAUSEA AND VOMITING, UNSPECIFIED VOMITING TYPE: ICD-10-CM

## 2025-07-28 NOTE — PROGRESS NOTES
Outreach call to patient to support a smooth transition of care from recent ED visit. No answer.  Antwon Ray RN [facility]  [phone]

## 2025-07-29 RX ORDER — METOCLOPRAMIDE 5 MG/1
5 TABLET ORAL 4 TIMES DAILY
Qty: 120 TABLET | Refills: 0 | Status: SHIPPED | OUTPATIENT
Start: 2025-07-29

## 2025-08-04 ENCOUNTER — APPOINTMENT (OUTPATIENT)
Dept: RHEUMATOLOGY | Facility: CLINIC | Age: 60
End: 2025-08-04
Payer: COMMERCIAL

## 2025-08-04 ENCOUNTER — APPOINTMENT (OUTPATIENT)
Dept: LAB | Facility: HOSPITAL | Age: 60
End: 2025-08-04
Payer: COMMERCIAL

## 2025-08-04 ENCOUNTER — LAB REQUISITION (OUTPATIENT)
Dept: LAB | Facility: HOSPITAL | Age: 60
End: 2025-08-04
Payer: COMMERCIAL

## 2025-08-04 ENCOUNTER — PATIENT OUTREACH (OUTPATIENT)
Dept: CARE COORDINATION | Facility: CLINIC | Age: 60
End: 2025-08-04

## 2025-08-04 ENCOUNTER — SPECIALTY PHARMACY (OUTPATIENT)
Dept: PHARMACY | Facility: CLINIC | Age: 60
End: 2025-08-04

## 2025-08-04 VITALS
SYSTOLIC BLOOD PRESSURE: 171 MMHG | BODY MASS INDEX: 47.04 KG/M2 | HEART RATE: 76 BPM | OXYGEN SATURATION: 96 % | WEIGHT: 255.6 LBS | TEMPERATURE: 98.5 F | HEIGHT: 62 IN | DIASTOLIC BLOOD PRESSURE: 99 MMHG

## 2025-08-04 DIAGNOSIS — K76.0 FATTY LIVER: ICD-10-CM

## 2025-08-04 DIAGNOSIS — M32.9 SYSTEMIC LUPUS ERYTHEMATOSUS, UNSPECIFIED: ICD-10-CM

## 2025-08-04 DIAGNOSIS — M32.9 SYSTEMIC LUPUS ERYTHEMATOSUS, UNSPECIFIED SLE TYPE, UNSPECIFIED ORGAN INVOLVEMENT STATUS (MULTI): Primary | ICD-10-CM

## 2025-08-04 DIAGNOSIS — E04.1 THYROID NODULE: ICD-10-CM

## 2025-08-04 DIAGNOSIS — M32.9 SYSTEMIC LUPUS ERYTHEMATOSUS, UNSPECIFIED SLE TYPE, UNSPECIFIED ORGAN INVOLVEMENT STATUS (MULTI): ICD-10-CM

## 2025-08-04 LAB
ALBUMIN SERPL BCP-MCNC: 4 G/DL (ref 3.4–5)
ALP SERPL-CCNC: 125 U/L (ref 33–136)
ALT SERPL W P-5'-P-CCNC: 10 U/L (ref 7–45)
ANION GAP SERPL CALC-SCNC: 10 MMOL/L (ref 10–20)
AST SERPL W P-5'-P-CCNC: 10 U/L (ref 9–39)
BASOPHILS # BLD AUTO: 0.05 X10*3/UL (ref 0–0.1)
BASOPHILS NFR BLD AUTO: 0.4 %
BILIRUB SERPL-MCNC: 0.2 MG/DL (ref 0–1.2)
BUN SERPL-MCNC: 13 MG/DL (ref 6–23)
CALCIUM SERPL-MCNC: 8.9 MG/DL (ref 8.6–10.3)
CHLORIDE SERPL-SCNC: 106 MMOL/L (ref 98–107)
CO2 SERPL-SCNC: 27 MMOL/L (ref 21–32)
CREAT SERPL-MCNC: 0.77 MG/DL (ref 0.5–1.05)
CRP SERPL-MCNC: 0.43 MG/DL
EGFRCR SERPLBLD CKD-EPI 2021: 88 ML/MIN/1.73M*2
EOSINOPHIL # BLD AUTO: 0.01 X10*3/UL (ref 0–0.7)
EOSINOPHIL NFR BLD AUTO: 0.1 %
ERYTHROCYTE [DISTWIDTH] IN BLOOD BY AUTOMATED COUNT: 18.6 % (ref 11.5–14.5)
GLUCOSE SERPL-MCNC: 144 MG/DL (ref 74–99)
HCT VFR BLD AUTO: 42.7 % (ref 36–46)
HGB BLD-MCNC: 12.3 G/DL (ref 12–16)
IMM GRANULOCYTES # BLD AUTO: 0.05 X10*3/UL (ref 0–0.7)
IMM GRANULOCYTES NFR BLD AUTO: 0.4 % (ref 0–0.9)
LYMPHOCYTES # BLD AUTO: 3.26 X10*3/UL (ref 1.2–4.8)
LYMPHOCYTES NFR BLD AUTO: 27.7 %
MCH RBC QN AUTO: 24.7 PG (ref 26–34)
MCHC RBC AUTO-ENTMCNC: 28.8 G/DL (ref 32–36)
MCV RBC AUTO: 86 FL (ref 80–100)
MONOCYTES # BLD AUTO: 0.84 X10*3/UL (ref 0.1–1)
MONOCYTES NFR BLD AUTO: 7.1 %
NEUTROPHILS # BLD AUTO: 7.58 X10*3/UL (ref 1.2–7.7)
NEUTROPHILS NFR BLD AUTO: 64.3 %
NRBC BLD-RTO: 0 /100 WBCS (ref 0–0)
PLATELET # BLD AUTO: 293 X10*3/UL (ref 150–450)
POTASSIUM SERPL-SCNC: 4.1 MMOL/L (ref 3.5–5.3)
PROT SERPL-MCNC: 6.6 G/DL (ref 6.4–8.2)
RBC # BLD AUTO: 4.98 X10*6/UL (ref 4–5.2)
SODIUM SERPL-SCNC: 139 MMOL/L (ref 136–145)
WBC # BLD AUTO: 11.8 X10*3/UL (ref 4.4–11.3)

## 2025-08-04 PROCEDURE — 85025 COMPLETE CBC W/AUTO DIFF WBC: CPT

## 2025-08-04 PROCEDURE — 96372 THER/PROPH/DIAG INJ SC/IM: CPT | Performed by: INTERNAL MEDICINE

## 2025-08-04 PROCEDURE — RXMED WILLOW AMBULATORY MEDICATION CHARGE

## 2025-08-04 PROCEDURE — 3080F DIAST BP >= 90 MM HG: CPT | Performed by: INTERNAL MEDICINE

## 2025-08-04 PROCEDURE — 3008F BODY MASS INDEX DOCD: CPT | Performed by: INTERNAL MEDICINE

## 2025-08-04 PROCEDURE — 86140 C-REACTIVE PROTEIN: CPT

## 2025-08-04 PROCEDURE — 99214 OFFICE O/P EST MOD 30 MIN: CPT | Performed by: INTERNAL MEDICINE

## 2025-08-04 PROCEDURE — 80053 COMPREHEN METABOLIC PANEL: CPT

## 2025-08-04 PROCEDURE — 3077F SYST BP >= 140 MM HG: CPT | Performed by: INTERNAL MEDICINE

## 2025-08-04 RX ORDER — BELIMUMAB 200 MG/ML
200 SOLUTION SUBCUTANEOUS
Qty: 4 ML | Refills: 11 | Status: SHIPPED | OUTPATIENT
Start: 2025-08-04

## 2025-08-04 RX ORDER — HYDROXYCHLOROQUINE SULFATE 200 MG/1
200 TABLET, FILM COATED ORAL DAILY
Qty: 30 TABLET | Refills: 11 | Status: SHIPPED | OUTPATIENT
Start: 2025-08-04 | End: 2026-08-04

## 2025-08-04 RX ORDER — TRIAMCINOLONE ACETONIDE 40 MG/ML
40 INJECTION, SUSPENSION INTRA-ARTICULAR; INTRAMUSCULAR ONCE
Status: COMPLETED | OUTPATIENT
Start: 2025-08-04 | End: 2025-08-04

## 2025-08-04 RX ADMIN — TRIAMCINOLONE ACETONIDE 40 MG: 40 INJECTION, SUSPENSION INTRA-ARTICULAR; INTRAMUSCULAR at 09:40

## 2025-08-04 ASSESSMENT — PAIN SCALES - GENERAL: PAINLEVEL_OUTOF10: 9

## 2025-08-04 NOTE — PROGRESS NOTES
Kenalog 40mg/ml administered to patient's right ventrogluteal area without incident per provider order. Patient tolerated well. Patient questions answered and patient encouraged to schedule next appointment prior to leaving office. Encouraged to call office with any concerns should they arise.

## 2025-08-04 NOTE — PROGRESS NOTES
Skeletal pain.  She also notes shooting pain from the right lower back down the right lower extremity.  She notes swelling in both legs related to lymphedema.  She is requesting hepatology evaluation of the fatty liver disease and endocrinology evaluation history of thyroid nodules.  She has not noted any rashes, photosensitivity, mucosal ulcerations, or joint swelling.  She has not started Benlysta yet because it has not been available to her.  She is requesting to resume hydroxychloroquine for management of lupus.    Examination shows a large body habitus.  There is preserved passive range of motion of the upper and lower extremity joints without joint effusions.  There is diffuse nonpitting edema at the ankles.    CT scan chest (7/25/2025) no evidence of pulmonary embolism.  There is a 6 mm right upper lobe pulmonary nodule.    Laboratory (7/24/2025) D-dimer 2445, troponin 7, WBC 9.9, hemoglobin 10.8, hematocrit 35.8, MCV 82, MCHC 30.2, platelets 325, BUN 9, creatinine 0.82, glucose 90, alk phosphatase 105, AST 15, ALT 9, calcium 9.0, albumin 3.9, (5/14/2025) RUSSELL 1: 320, RNP 1.0, C3 185, C4 55, reticulocyte count 1.4, ferritin 32, iron 45, TIBC 363, saturation 12%.    She has type 2 diabetes mellitus, hyperlipidemia, hypertension, history of congestive heart failure, post lumbar laminectomy syndrome L4/L5 and L5/S1, bipolar disorder, pulmonary hypertension, obstructive sleep apnea, iron deficiency anemia, antiphospholipid syndrome, fibromyalgia, gout, mixed connective tissue disease/lupus.    She was given intramuscular injection of triamcinolone 40 mg by the nurse.  She is referred to endocrinology and to hepatology per her request for evaluation of the fibroid and fatty liver disease respectively.  Reapply for Benlysta for treatment of lupus.  She is to start hydroxychloroquine 200 mg once per day.  She is to return in 6 months.

## 2025-08-04 NOTE — PROGRESS NOTES
Outreach to the patient following their recent visit to the ED to assess their needs and provide any necessary follow-up support. The attempt to reach the patient was unsuccessful, unable to make contact at this time. No answer.    Georgette Correa RN, [facility] [place]  Phone [phone]

## 2025-08-05 ENCOUNTER — SPECIALTY PHARMACY (OUTPATIENT)
Dept: PHARMACY | Facility: CLINIC | Age: 60
End: 2025-08-05

## 2025-08-05 LAB
HOLD SPECIMEN: NORMAL
HOLD SPECIMEN: NORMAL

## 2025-08-06 ENCOUNTER — PHARMACY VISIT (OUTPATIENT)
Dept: PHARMACY | Facility: CLINIC | Age: 60
End: 2025-08-06
Payer: MEDICAID

## 2025-08-07 LAB
CHOLEST SERPL-MCNC: NORMAL MG/DL
CHOLEST/HDLC SERPL: NORMAL (CALC)
HDLC SERPL-MCNC: NORMAL MG/DL
LDLC SERPL CALC-MCNC: NORMAL MG/DL (CALC)
NONHDLC SERPL-MCNC: NORMAL MG/DL (CALC)
TRIGL SERPL-MCNC: NORMAL MG/DL

## 2025-08-08 ENCOUNTER — TELEMEDICINE CLINICAL SUPPORT (OUTPATIENT)
Dept: PHARMACY | Facility: HOSPITAL | Age: 60
End: 2025-08-08
Payer: COMMERCIAL

## 2025-08-08 NOTE — PROGRESS NOTES
[facility] Clinical Note  Initial Patient Education     Introduction  Josemanuel Reinoso is a 60 y.o. female who is on the specialty pharmacy service for management of: Rheumatology Core.    Josemanuel Reinoso is initiating the following therapy:     Benlysta 200mg subcutaneous once weekly For SLE        Medication receipt date: 8/7/25    Duration of therapy: Maintenance - pending efficacy / tolerance     The most recent encounter visit with the referring prescriber Dr. Ward on 8/4/25 was reviewed.  Pharmacy will continue to collaborate in the care of this patient with the referring prescriber.    Clinical Background  An initial assessment was conducted prior to first fill of the medication to determine the appropriateness of therapy given the patient's diagnosis, medication list, comorbidities, allergies, medical history, patient's ability to self administer medication, and therapeutic goals based on possible outcomes of therapy. Refer to initial assessment task completed on 8/4/25.    Labs for clinical appropriateness that were reviewed include:   Rheumatology- CBC-diff:   Lab Results   Component Value Date    WBC 11.8 (H) 08/04/2025    RBC 4.98 08/04/2025    HGB 12.3 08/04/2025    HCT 42.7 08/04/2025    MCV 86 08/04/2025    MCHC 28.8 (L) 08/04/2025     08/04/2025    RDW 18.6 (H) 08/04/2025    NEUTOPHILPCT 64.3 08/04/2025    IGPCT 0.4 08/04/2025    LYMPHOPCT 27.7 08/04/2025    MONOPCT 7.1 08/04/2025    EOSPCT 0.1 08/04/2025    BASOPCT 0.4 08/04/2025    NEUTROABS 7.58 08/04/2025    LYMPHSABS 3.26 08/04/2025    MONOSABS 0.84 08/04/2025    EOSABS 0.01 08/04/2025    BASOSABS 0.05 08/04/2025   , CMP:   Lab Results   Component Value Date    GLUCOSE 144 (H) 08/04/2025     08/04/2025    K 4.1 08/04/2025     08/04/2025    CO2 27 08/04/2025    ANIONGAP 10 08/04/2025    BUN 13 08/04/2025    CREATININE 0.77 08/04/2025    CALCIUM 8.9 08/04/2025    ALBUMIN 4.0 08/04/2025    ALKPHOS  125 08/04/2025    PROT 6.6 08/04/2025    AST 10 08/04/2025    BILITOT 0.2 08/04/2025    ALT 10 08/04/2025   , Hepatitis B panel:   Lab Results   Component Value Date    HEPBCIGM NON-REACTIVE 04/23/2025    HEPBSAG NON-REACTIVE 04/23/2025   , LFTs and bilirubin:   Lab Results   Component Value Date    ALT 10 08/04/2025    AST 10 08/04/2025    GGT 35 04/23/2025    ALKPHOS 125 08/04/2025    BILITOT 0.2 08/04/2025   , CRP:   Lab Results   Component Value Date    CRP 0.43 08/04/2025   , RUSSELL:   Lab Results   Component Value Date    RUSSELL POSITIVE (A) 05/14/2025   , RUSSELL Titer/EULALIA Panel:   Lab Results   Component Value Date    ANATITER 1:320 (H) 05/14/2025    ANAPATTRN Cytoplasmic (A) 05/14/2025    ASM <1.0 NEG 05/14/2025    ARNP 1.0 POS (A) 05/14/2025    ASMRN <1.0 NEG 05/14/2025    ASSA <0.2 05/17/2024    ASSB <0.2 05/17/2024    ASCL <0.2 05/17/2024    JO1 <0.2 05/17/2024    ACHR <1.0 NEG 05/14/2025    ACEN <0.2 05/17/2024    DNADS 1 05/14/2025   , ESR:    Lab Results   Component Value Date    SEDRATE 24 (H) 07/14/2019   , C3 & C4 Complements:   Lab Results   Component Value Date    C3 185 05/14/2025    C4 55 05/14/2025    Urinalysis:    Lab Results   Component Value Date    WBCU NONE SEEN 09/22/2022    RBCU 3 09/22/2022       Education/Discussion  Josemanuel was contacted on 8/8/2025 at 11:06 AM for a pharmacy visit with encounter number [phone] from:   [facility]  [id number]  [facility] [address]  [facility] [address]  Dept: [phone]  Dept Fax: [phone]  Loc: [phone]  Josemanuel consented to a/an Telephone visit, which was performed.    Medication Start Date (planned or actual): Did not start yet         Education was conducted prior to start of therapy? Yes    Education discussed includes the following:  Patient Education  Counseled the Patient on the Following : Safe handling, storage, and disposal  Learner: Patient  Education Response: Refused  teaching  Additional details of the medication specific counseling are found within the linked patient education flowsheet.     The follow up timeline was discussed. Every person responds to and reacts to therapy differently. Patient should be assessed for efficacy and tolerability in approximately: 3 months    Provided education on goals and possible outcomes of therapy:  Adherence with therapy  Timely completion of appropriate labs  Timely and appropriate follow up with provider  Identify and address medication interactions with presciption medications, OTC medications and supplements  Optimize or maintain quality of life  Rheumatology: Remission or low disease activity  Reduction of inflammation, joint pain, swelling, and morning stiffness           The importance of adherence was discussed and they were advised to take the medication as prescribed by their provider.         Impression/Plan  Review and Assessment   Medications Assessed for Appropriate Use, Dose, Route, Frequency, and Duration: No (Comment)  Drug Interactions Evaluated: Yes  Clinically Relevant Drug Interactions Identified: No    This patient has not been identified as high risk due to Lack of high risk qualifiers.  The following action was taken: N/A.         The  Specialty Pharmacy Welcome packet may be viewed here:   Specialty Pharmacy Welcome Packet     Or by scanning QR code:      Provided contact information ([phone]) for [facility] and reviewed dispensing process, refill timeline and patient management follow up. Advised to contact the pharmacy if there are any adverse effects and/or changes to medication list, including prescriptions, OTC medications, herbal products, or supplements. Confirmed understanding of education conducted during assessment. All questions and concerns were addressed and patient was encouraged to reach out for additional questions or concerns.      Rani Carson, AlanD

## 2025-09-30 ENCOUNTER — APPOINTMENT (OUTPATIENT)
Dept: GASTROENTEROLOGY | Facility: CLINIC | Age: 60
End: 2025-09-30
Payer: COMMERCIAL

## 2025-11-26 ENCOUNTER — APPOINTMENT (OUTPATIENT)
Dept: RHEUMATOLOGY | Facility: CLINIC | Age: 60
End: 2025-11-26
Payer: COMMERCIAL

## 2026-03-16 ENCOUNTER — APPOINTMENT (OUTPATIENT)
Dept: RHEUMATOLOGY | Facility: CLINIC | Age: 61
End: 2026-03-16
Payer: COMMERCIAL